# Patient Record
Sex: MALE | Race: WHITE | NOT HISPANIC OR LATINO | URBAN - METROPOLITAN AREA
[De-identification: names, ages, dates, MRNs, and addresses within clinical notes are randomized per-mention and may not be internally consistent; named-entity substitution may affect disease eponyms.]

---

## 2020-08-05 ENCOUNTER — APPOINTMENT (OUTPATIENT)
Dept: URBAN - METROPOLITAN AREA CLINIC 237 | Age: 60
Setting detail: DERMATOLOGY
End: 2020-08-05

## 2020-08-05 VITALS — TEMPERATURE: 98.2 F

## 2020-08-05 DIAGNOSIS — D22 MELANOCYTIC NEVI: ICD-10-CM

## 2020-08-05 DIAGNOSIS — L57.0 ACTINIC KERATOSIS: ICD-10-CM

## 2020-08-05 DIAGNOSIS — L82.1 OTHER SEBORRHEIC KERATOSIS: ICD-10-CM

## 2020-08-05 PROBLEM — D22.5 MELANOCYTIC NEVI OF TRUNK: Status: ACTIVE | Noted: 2020-08-05

## 2020-08-05 PROCEDURE — 17000 DESTRUCT PREMALG LESION: CPT

## 2020-08-05 PROCEDURE — OTHER REASSURANCE: OTHER

## 2020-08-05 PROCEDURE — OTHER LIQUID NITROGEN: OTHER

## 2020-08-05 PROCEDURE — OTHER MIPS QUALITY: OTHER

## 2020-08-05 PROCEDURE — OTHER OBSERVATION AND MEASURE: OTHER

## 2020-08-05 PROCEDURE — OTHER ADDITIONAL NOTES: OTHER

## 2020-08-05 PROCEDURE — OTHER COUNSELING: OTHER

## 2020-08-05 PROCEDURE — OTHER OBSERVATION: OTHER

## 2020-08-05 PROCEDURE — 99203 OFFICE O/P NEW LOW 30 MIN: CPT | Mod: 25

## 2020-08-05 ASSESSMENT — LOCATION SIMPLE DESCRIPTION DERM
LOCATION SIMPLE: ABDOMEN
LOCATION SIMPLE: RIGHT FOREHEAD
LOCATION SIMPLE: RIGHT LOWER BACK

## 2020-08-05 ASSESSMENT — LOCATION ZONE DERM
LOCATION ZONE: FACE
LOCATION ZONE: TRUNK

## 2020-08-05 ASSESSMENT — LOCATION DETAILED DESCRIPTION DERM
LOCATION DETAILED: RIGHT SUPERIOR MEDIAL MIDBACK
LOCATION DETAILED: RIGHT INFERIOR MEDIAL MIDBACK
LOCATION DETAILED: RIGHT MEDIAL FOREHEAD
LOCATION DETAILED: RIGHT LATERAL ABDOMEN

## 2020-08-05 NOTE — PROCEDURE: MIPS QUALITY
Quality 110: Preventive Care And Screening: Influenza Immunization: Influenza Immunization Administered during Influenza season
Quality 400a: One-Time Screening For Hepatitis C Virus (Hcv) For All Patients: Patient received one-time screening for HCV infection
Detail Level: Detailed

## 2020-08-14 ENCOUNTER — APPOINTMENT (OUTPATIENT)
Dept: URBAN - METROPOLITAN AREA CLINIC 237 | Age: 60
Setting detail: DERMATOLOGY
End: 2020-08-14

## 2020-08-14 DIAGNOSIS — L82.0 INFLAMED SEBORRHEIC KERATOSIS: ICD-10-CM

## 2020-08-14 PROCEDURE — 17110 DESTRUCT B9 LESION 1-14: CPT | Mod: 79

## 2020-08-14 PROCEDURE — OTHER LIQUID NITROGEN: OTHER

## 2020-08-14 ASSESSMENT — LOCATION ZONE DERM: LOCATION ZONE: TRUNK

## 2020-08-14 ASSESSMENT — LOCATION SIMPLE DESCRIPTION DERM: LOCATION SIMPLE: RIGHT UPPER BACK

## 2020-08-14 ASSESSMENT — LOCATION DETAILED DESCRIPTION DERM: LOCATION DETAILED: RIGHT INFERIOR MEDIAL UPPER BACK

## 2020-08-14 NOTE — PROCEDURE: LIQUID NITROGEN
Detail Level: Simple
Post-Care Instructions: I reviewed with the patient in detail post-care instructions. Patient is to leave areas alone, and resume any home care if nothing happens, or when the scab falls off.
Medical Necessity Clause: This procedure was medically necessary because the lesions that were treated were:
Render Note In Bullet Format When Appropriate: No
Consent: The patient/parent's consent was obtained including but not limited to risks of crusting, scabbing, blistering, scarring, darker or lighter pigmentary change, recurrence, incomplete removal.
Include Z78.9 (Other Specified Conditions Influencing Health Status) As An Associated Diagnosis?: Yes
Aperture Size (Optional): D
Medical Necessity Information: It is in your best interest to select a reason for this procedure from the list below. All of these items fulfill various CMS LCD requirements except the new and changing color options.
Number Of Freeze-Thaw Cycles: 3 freeze-thaw cycles

## 2020-12-28 ENCOUNTER — APPOINTMENT (OUTPATIENT)
Dept: URBAN - METROPOLITAN AREA CLINIC 237 | Age: 60
Setting detail: DERMATOLOGY
End: 2020-12-28

## 2020-12-28 VITALS — TEMPERATURE: 97.3 F

## 2020-12-28 DIAGNOSIS — L90.5 SCAR CONDITIONS AND FIBROSIS OF SKIN: ICD-10-CM

## 2020-12-28 DIAGNOSIS — Z80.8 FAMILY HISTORY OF MALIGNANT NEOPLASM OF OTHER ORGANS OR SYSTEMS: ICD-10-CM

## 2020-12-28 DIAGNOSIS — D18.0 HEMANGIOMA: ICD-10-CM

## 2020-12-28 DIAGNOSIS — L81.8 OTHER SPECIFIED DISORDERS OF PIGMENTATION: ICD-10-CM

## 2020-12-28 DIAGNOSIS — D22 MELANOCYTIC NEVI: ICD-10-CM

## 2020-12-28 DIAGNOSIS — I78.8 OTHER DISEASES OF CAPILLARIES: ICD-10-CM

## 2020-12-28 DIAGNOSIS — L82.1 OTHER SEBORRHEIC KERATOSIS: ICD-10-CM

## 2020-12-28 DIAGNOSIS — L57.8 OTHER SKIN CHANGES DUE TO CHRONIC EXPOSURE TO NONIONIZING RADIATION: ICD-10-CM

## 2020-12-28 DIAGNOSIS — I83.9 ASYMPTOMATIC VARICOSE VEINS OF LOWER EXTREMITIES: ICD-10-CM

## 2020-12-28 PROBLEM — D23.72 OTHER BENIGN NEOPLASM OF SKIN OF LEFT LOWER LIMB, INCLUDING HIP: Status: ACTIVE | Noted: 2020-12-28

## 2020-12-28 PROBLEM — D18.01 HEMANGIOMA OF SKIN AND SUBCUTANEOUS TISSUE: Status: ACTIVE | Noted: 2020-12-28

## 2020-12-28 PROBLEM — D22.5 MELANOCYTIC NEVI OF TRUNK: Status: ACTIVE | Noted: 2020-12-28

## 2020-12-28 PROBLEM — I83.93 ASYMPTOMATIC VARICOSE VEINS OF BILATERAL LOWER EXTREMITIES: Status: ACTIVE | Noted: 2020-12-28

## 2020-12-28 PROCEDURE — OTHER NOTED ON EXAM BUT NOT TREATED: OTHER

## 2020-12-28 PROCEDURE — OTHER OBSERVATION: OTHER

## 2020-12-28 PROCEDURE — OTHER DIAGNOSIS COMMENT: OTHER

## 2020-12-28 PROCEDURE — OTHER MIPS QUALITY: OTHER

## 2020-12-28 PROCEDURE — OTHER COUNSELING: OTHER

## 2020-12-28 PROCEDURE — OTHER OBSERVATION AND MEASURE: OTHER

## 2020-12-28 PROCEDURE — 99213 OFFICE O/P EST LOW 20 MIN: CPT

## 2020-12-28 PROCEDURE — OTHER PHOTO-DOCUMENTATION: OTHER

## 2020-12-28 ASSESSMENT — LOCATION DETAILED DESCRIPTION DERM
LOCATION DETAILED: RIGHT ACHILLES SKIN
LOCATION DETAILED: EPIGASTRIC SKIN
LOCATION DETAILED: INFERIOR THORACIC SPINE
LOCATION DETAILED: RIGHT CLAVICULAR SKIN
LOCATION DETAILED: LEFT ANTERIOR PROXIMAL THIGH
LOCATION DETAILED: RIGHT DISTAL CALF
LOCATION DETAILED: LEFT DISTAL CALF
LOCATION DETAILED: LEFT POSTERIOR MEDIAL MALLEOLUS
LOCATION DETAILED: RIGHT ANTERIOR DISTAL UPPER ARM
LOCATION DETAILED: NASAL DORSUM
LOCATION DETAILED: LEFT ANTERIOR LATERAL DISTAL UPPER ARM
LOCATION DETAILED: LEFT LATERAL ABDOMEN
LOCATION DETAILED: RIGHT LATERAL ABDOMEN
LOCATION DETAILED: SUPERIOR THORACIC SPINE
LOCATION DETAILED: RIGHT KNEE
LOCATION DETAILED: STERNUM
LOCATION DETAILED: RIGHT INFERIOR MEDIAL MIDBACK

## 2020-12-28 ASSESSMENT — LOCATION SIMPLE DESCRIPTION DERM
LOCATION SIMPLE: RIGHT CALF
LOCATION SIMPLE: RIGHT KNEE
LOCATION SIMPLE: RIGHT ACHILLES SKIN
LOCATION SIMPLE: LEFT UPPER ARM
LOCATION SIMPLE: NOSE
LOCATION SIMPLE: LEFT CALF
LOCATION SIMPLE: LEFT THIGH
LOCATION SIMPLE: RIGHT UPPER ARM
LOCATION SIMPLE: LEFT ANKLE
LOCATION SIMPLE: ABDOMEN
LOCATION SIMPLE: CHEST
LOCATION SIMPLE: RIGHT CLAVICULAR SKIN
LOCATION SIMPLE: UPPER BACK
LOCATION SIMPLE: RIGHT LOWER BACK

## 2020-12-28 ASSESSMENT — LOCATION ZONE DERM
LOCATION ZONE: ARM
LOCATION ZONE: NOSE
LOCATION ZONE: LEG
LOCATION ZONE: TRUNK

## 2020-12-28 NOTE — PROCEDURE: OBSERVATION
Detail Level: Detailed
Size Of Lesion: 5x1.5mm mbm
Size Of Lesion: 7 x 6mm light brown patch
Size Of Lesion: 3 x 2mm med br

## 2020-12-28 NOTE — PROCEDURE: DIAGNOSIS COMMENT
Detail Level: Simple
Comment: 2/2 surgery
Comment: 2/2 kidney retrieval surgery (patients was donor)
Comment: Father

## 2021-07-14 ENCOUNTER — APPOINTMENT (OUTPATIENT)
Dept: URBAN - METROPOLITAN AREA CLINIC 237 | Age: 61
Setting detail: DERMATOLOGY
End: 2021-07-14

## 2021-07-14 DIAGNOSIS — L259 CONTACT DERMATITIS AND OTHER ECZEMA, UNSPECIFIED CAUSE: ICD-10-CM

## 2021-07-14 PROBLEM — L30.8 OTHER SPECIFIED DERMATITIS: Status: ACTIVE | Noted: 2021-07-14

## 2021-07-14 PROCEDURE — OTHER TREATMENT REGIMEN: OTHER

## 2021-07-14 PROCEDURE — OTHER PRESCRIPTION: OTHER

## 2021-07-14 PROCEDURE — OTHER COUNSELING: OTHER

## 2021-07-14 PROCEDURE — OTHER COUNSELING: TOPICAL STEROIDS: OTHER

## 2021-07-14 PROCEDURE — 99213 OFFICE O/P EST LOW 20 MIN: CPT

## 2021-07-14 RX ORDER — DESONIDE 0.5 MG/G
OINTMENT TOPICAL
Qty: 1 | Refills: 0 | Status: ERX | COMMUNITY
Start: 2021-07-14

## 2021-07-14 ASSESSMENT — LOCATION ZONE DERM
LOCATION ZONE: PERINEUM
LOCATION ZONE: SCROTUM

## 2021-07-14 ASSESSMENT — LOCATION DETAILED DESCRIPTION DERM
LOCATION DETAILED: PERINEUM
LOCATION DETAILED: SUPERIOR MIDLINE SCROTUM

## 2021-07-14 ASSESSMENT — LOCATION SIMPLE DESCRIPTION DERM
LOCATION SIMPLE: PERINEUM
LOCATION SIMPLE: SUPERIOR SCROTUM

## 2021-08-18 ENCOUNTER — RX ONLY (RX ONLY)
Age: 61
End: 2021-08-18

## 2021-08-18 ENCOUNTER — APPOINTMENT (OUTPATIENT)
Dept: URBAN - METROPOLITAN AREA CLINIC 237 | Age: 61
Setting detail: DERMATOLOGY
End: 2021-08-18

## 2021-08-18 DIAGNOSIS — L259 CONTACT DERMATITIS AND OTHER ECZEMA, UNSPECIFIED CAUSE: ICD-10-CM

## 2021-08-18 PROBLEM — L30.8 OTHER SPECIFIED DERMATITIS: Status: ACTIVE | Noted: 2021-08-18

## 2021-08-18 PROCEDURE — OTHER TREATMENT REGIMEN: OTHER

## 2021-08-18 PROCEDURE — OTHER COUNSELING: TOPICAL STEROIDS: OTHER

## 2021-08-18 PROCEDURE — 99213 OFFICE O/P EST LOW 20 MIN: CPT

## 2021-08-18 PROCEDURE — OTHER COUNSELING: OTHER

## 2021-08-18 RX ORDER — TACROLIMUS 1 MG/G
OINTMENT TOPICAL
Qty: 1 | Refills: 0 | Status: ERX | COMMUNITY
Start: 2021-08-18

## 2021-08-18 RX ORDER — DESONIDE 0.5 MG/G
OINTMENT TOPICAL
Qty: 1 | Refills: 0 | Status: ERX

## 2021-08-18 ASSESSMENT — LOCATION ZONE DERM
LOCATION ZONE: SCROTUM
LOCATION ZONE: PERINEUM

## 2021-08-18 ASSESSMENT — LOCATION DETAILED DESCRIPTION DERM
LOCATION DETAILED: SUPERIOR MIDLINE SCROTUM
LOCATION DETAILED: PERINEUM

## 2021-08-18 ASSESSMENT — LOCATION SIMPLE DESCRIPTION DERM
LOCATION SIMPLE: SUPERIOR SCROTUM
LOCATION SIMPLE: PERINEUM

## 2021-08-18 NOTE — PROCEDURE: TREATMENT REGIMEN
Detail Level: Simple
Initiate Treatment: (OTC) gold bond or Zeasorb powder\\nProtopic 0.1% se BID PRN when not using desonide
Continue Regimen: Desonide 0.05% se BID PRN for no longer than 2 weeks at a time

## 2021-12-15 ENCOUNTER — APPOINTMENT (OUTPATIENT)
Dept: URBAN - METROPOLITAN AREA CLINIC 237 | Age: 61
Setting detail: DERMATOLOGY
End: 2021-12-15

## 2021-12-15 DIAGNOSIS — Z80.8 FAMILY HISTORY OF MALIGNANT NEOPLASM OF OTHER ORGANS OR SYSTEMS: ICD-10-CM

## 2021-12-15 DIAGNOSIS — L81.8 OTHER SPECIFIED DISORDERS OF PIGMENTATION: ICD-10-CM

## 2021-12-15 DIAGNOSIS — D22 MELANOCYTIC NEVI: ICD-10-CM

## 2021-12-15 DIAGNOSIS — I83.9 ASYMPTOMATIC VARICOSE VEINS OF LOWER EXTREMITIES: ICD-10-CM

## 2021-12-15 DIAGNOSIS — L90.5 SCAR CONDITIONS AND FIBROSIS OF SKIN: ICD-10-CM

## 2021-12-15 DIAGNOSIS — L57.0 ACTINIC KERATOSIS: ICD-10-CM

## 2021-12-15 DIAGNOSIS — D18.0 HEMANGIOMA: ICD-10-CM

## 2021-12-15 DIAGNOSIS — L259 CONTACT DERMATITIS AND OTHER ECZEMA, UNSPECIFIED CAUSE: ICD-10-CM

## 2021-12-15 DIAGNOSIS — L82.1 OTHER SEBORRHEIC KERATOSIS: ICD-10-CM

## 2021-12-15 DIAGNOSIS — L57.8 OTHER SKIN CHANGES DUE TO CHRONIC EXPOSURE TO NONIONIZING RADIATION: ICD-10-CM

## 2021-12-15 DIAGNOSIS — L71.8 OTHER ROSACEA: ICD-10-CM

## 2021-12-15 PROBLEM — I83.93 ASYMPTOMATIC VARICOSE VEINS OF BILATERAL LOWER EXTREMITIES: Status: ACTIVE | Noted: 2021-12-15

## 2021-12-15 PROBLEM — D23.72 OTHER BENIGN NEOPLASM OF SKIN OF LEFT LOWER LIMB, INCLUDING HIP: Status: ACTIVE | Noted: 2021-12-15

## 2021-12-15 PROBLEM — D22.5 MELANOCYTIC NEVI OF TRUNK: Status: ACTIVE | Noted: 2021-12-15

## 2021-12-15 PROBLEM — D18.01 HEMANGIOMA OF SKIN AND SUBCUTANEOUS TISSUE: Status: ACTIVE | Noted: 2021-12-15

## 2021-12-15 PROBLEM — L30.8 OTHER SPECIFIED DERMATITIS: Status: ACTIVE | Noted: 2021-12-15

## 2021-12-15 PROCEDURE — OTHER LIQUID NITROGEN: OTHER

## 2021-12-15 PROCEDURE — OTHER OBSERVATION: OTHER

## 2021-12-15 PROCEDURE — OTHER PRESCRIPTION: OTHER

## 2021-12-15 PROCEDURE — OTHER MIPS QUALITY: OTHER

## 2021-12-15 PROCEDURE — OTHER BIOPSY BY SHAVE METHOD: OTHER

## 2021-12-15 PROCEDURE — OTHER OBSERVATION AND MEASURE: OTHER

## 2021-12-15 PROCEDURE — OTHER DIAGNOSIS COMMENT: OTHER

## 2021-12-15 PROCEDURE — OTHER TREATMENT REGIMEN: OTHER

## 2021-12-15 PROCEDURE — 11102 TANGNTL BX SKIN SINGLE LES: CPT

## 2021-12-15 PROCEDURE — OTHER MEDICAL PHOTOGRAPHY REVIEW: OTHER

## 2021-12-15 PROCEDURE — OTHER COUNSELING: TOPICAL STEROIDS: OTHER

## 2021-12-15 PROCEDURE — 17000 DESTRUCT PREMALG LESION: CPT | Mod: 59

## 2021-12-15 PROCEDURE — OTHER COUNSELING: OTHER

## 2021-12-15 PROCEDURE — 99213 OFFICE O/P EST LOW 20 MIN: CPT | Mod: 25

## 2021-12-15 PROCEDURE — OTHER PHOTO-DOCUMENTATION: OTHER

## 2021-12-15 PROCEDURE — OTHER NOTED ON EXAM BUT NOT TREATED: OTHER

## 2021-12-15 RX ORDER — METRONIDAZOLE 7.5 MG/G
CREAM TOPICAL BID
Qty: 45 | Refills: 1 | Status: ERX | COMMUNITY
Start: 2021-12-15

## 2021-12-15 RX ORDER — DESONIDE 0.5 MG/G
OINTMENT TOPICAL
Qty: 60 | Refills: 0 | Status: ERX | COMMUNITY
Start: 2021-12-15

## 2021-12-15 ASSESSMENT — LOCATION DETAILED DESCRIPTION DERM
LOCATION DETAILED: RIGHT CLAVICULAR SKIN
LOCATION DETAILED: LEFT ANTERIOR PROXIMAL THIGH
LOCATION DETAILED: SUPERIOR MIDLINE SCROTUM
LOCATION DETAILED: LEFT CAVUM CONCHA
LOCATION DETAILED: RIGHT INFERIOR MEDIAL UPPER BACK
LOCATION DETAILED: INFERIOR THORACIC SPINE
LOCATION DETAILED: RIGHT LATERAL ABDOMEN
LOCATION DETAILED: PERINEUM
LOCATION DETAILED: NASAL DORSUM
LOCATION DETAILED: LEFT POSTERIOR MEDIAL MALLEOLUS
LOCATION DETAILED: SUPERIOR THORACIC SPINE
LOCATION DETAILED: LEFT ANTERIOR LATERAL DISTAL UPPER ARM
LOCATION DETAILED: LEFT LATERAL ABDOMEN
LOCATION DETAILED: RIGHT DISTAL CALF
LOCATION DETAILED: PERIUMBILICAL SKIN
LOCATION DETAILED: EPIGASTRIC SKIN
LOCATION DETAILED: LEFT LATERAL UPPER BACK
LOCATION DETAILED: RIGHT ACHILLES SKIN
LOCATION DETAILED: STERNUM
LOCATION DETAILED: LEFT CENTRAL MALAR CHEEK
LOCATION DETAILED: RIGHT KNEE
LOCATION DETAILED: RIGHT ANTERIOR DISTAL UPPER ARM
LOCATION DETAILED: LEFT DISTAL CALF

## 2021-12-15 ASSESSMENT — LOCATION SIMPLE DESCRIPTION DERM
LOCATION SIMPLE: LEFT ANKLE
LOCATION SIMPLE: RIGHT ACHILLES SKIN
LOCATION SIMPLE: RIGHT UPPER ARM
LOCATION SIMPLE: LEFT THIGH
LOCATION SIMPLE: LEFT CALF
LOCATION SIMPLE: RIGHT KNEE
LOCATION SIMPLE: ABDOMEN
LOCATION SIMPLE: LEFT UPPER BACK
LOCATION SIMPLE: RIGHT CALF
LOCATION SIMPLE: PERINEUM
LOCATION SIMPLE: CHEST
LOCATION SIMPLE: LEFT CHEEK
LOCATION SIMPLE: LEFT UPPER ARM
LOCATION SIMPLE: RIGHT CLAVICULAR SKIN
LOCATION SIMPLE: LEFT EAR
LOCATION SIMPLE: NOSE
LOCATION SIMPLE: RIGHT UPPER BACK
LOCATION SIMPLE: UPPER BACK
LOCATION SIMPLE: SUPERIOR SCROTUM

## 2021-12-15 ASSESSMENT — LOCATION ZONE DERM
LOCATION ZONE: TRUNK
LOCATION ZONE: LEG
LOCATION ZONE: FACE
LOCATION ZONE: PERINEUM
LOCATION ZONE: NOSE
LOCATION ZONE: SCROTUM
LOCATION ZONE: EAR
LOCATION ZONE: ARM

## 2021-12-15 NOTE — PROCEDURE: OBSERVATION
Size Of Lesion: 5x1.5mm mbm
Size Of Lesion: 7x6mm light brown patch
Detail Level: Detailed
Size Of Lesion: 4x3 mm mb thin papule

## 2021-12-15 NOTE — PROCEDURE: TREATMENT REGIMEN
Initiate Treatment: MC 0.75% cr bid
Continue Regimen: Desonide 0.05% LIANA BID PRN x2 wks max\\nGB or Zeasorb powder PRN
Detail Level: Simple

## 2021-12-15 NOTE — PROCEDURE: BIOPSY BY SHAVE METHOD
Hide Anesthesia Volume?: No
Depth Of Biopsy: dermis
Consent: Written consent was obtained and risks were reviewed including but not limited to scarring, infection, bleeding, scabbing, incomplete removal, nerve damage and allergy to anesthesia.
Detail Level: Detailed
Size Of Lesion In Cm: 0
Cryotherapy Text: The wound bed was treated with cryotherapy after the biopsy was performed.
Electrodesiccation And Curettage Text: The wound bed was treated with electrodesiccation and curettage after the biopsy was performed.
Wound Care: Aquaphor
Billing Type: Third-Party Bill
Dressing: Band-Aid
Electrodesiccation Text: The wound bed was treated with electrodesiccation after the biopsy was performed.
Post-Care Instructions: I reviewed with the patient in detail post-care instructions. Patient is to keep the biopsy site dry overnight, and then apply bacitracin twice daily until healed. Patient may apply hydrogen peroxide soaks to remove any crusting.
Validate Note Data (See Information Below): Yes
Silver Nitrate Text: The wound bed was treated with silver nitrate after the biopsy was performed.
Anesthesia Volume In Cc (Will Not Render If 0): 0.1
Curettage Text: The wound bed was treated with curettage after the biopsy was performed.
Hemostasis: Drysol
Biopsy Method: Dermablade
Type Of Destruction Used: Curettage
Notification Instructions: Patient will be notified of biopsy results. However, patient instructed to call the office if not contacted within 2 weeks.
Anesthesia Type: 1% lidocaine with epinephrine
Biopsy Type: H and E
Information: Selecting Yes will display possible errors in your note based on the variables you have selected. This validation is only offered as a suggestion for you. PLEASE NOTE THAT THE VALIDATION TEXT WILL BE REMOVED WHEN YOU FINALIZE YOUR NOTE. IF YOU WANT TO FAX A PRELIMINARY NOTE YOU WILL NEED TO TOGGLE THIS TO 'NO' IF YOU DO NOT WANT IT IN YOUR FAXED NOTE.

## 2021-12-15 NOTE — PROCEDURE: LIQUID NITROGEN
Show Applicator Variable?: Yes
Detail Level: Simple
Post-Care Instructions: I reviewed with the patient in detail post-care instructions. Patient is to wear sunprotection, and avoid picking at any of the treated lesions. Pt may apply Vaseline to crusted or scabbing areas.
Render Note In Bullet Format When Appropriate: No
Number Of Freeze-Thaw Cycles: 2 freeze-thaw cycles
Duration Of Freeze Thaw-Cycle (Seconds): 5
Consent: The patient's consent was obtained including but not limited to risks of crusting, scabbing, blistering, scarring, darker or lighter pigmentary change, recurrence, incomplete removal and infection.

## 2022-09-13 ENCOUNTER — RX ONLY (RX ONLY)
Age: 62
End: 2022-09-13

## 2022-09-13 RX ORDER — DESONIDE 0.5 MG/G
OINTMENT TOPICAL
Qty: 60 | Refills: 0 | Status: ERX

## 2022-09-26 ENCOUNTER — RX ONLY (RX ONLY)
Age: 62
End: 2022-09-26

## 2022-09-26 RX ORDER — DESONIDE 0.5 MG/G
OINTMENT TOPICAL
Qty: 60 | Refills: 0 | Status: ERX

## 2023-02-14 ENCOUNTER — NON-APPOINTMENT (OUTPATIENT)
Age: 63
End: 2023-02-14

## 2023-02-15 ENCOUNTER — APPOINTMENT (OUTPATIENT)
Dept: OTOLARYNGOLOGY | Facility: CLINIC | Age: 63
End: 2023-02-15
Payer: COMMERCIAL

## 2023-02-15 VITALS — TEMPERATURE: 97 F | BODY MASS INDEX: 26.18 KG/M2 | WEIGHT: 215 LBS | HEIGHT: 76 IN

## 2023-02-15 DIAGNOSIS — H69.81 OTHER SPECIFIED DISORDERS OF EUSTACHIAN TUBE, RIGHT EAR: ICD-10-CM

## 2023-02-15 DIAGNOSIS — H61.813 EXOSTOSIS OF EXTERNAL CANAL, BILATERAL: ICD-10-CM

## 2023-02-15 DIAGNOSIS — H66.91 OTITIS MEDIA, UNSPECIFIED, RIGHT EAR: ICD-10-CM

## 2023-02-15 DIAGNOSIS — H90.3 SENSORINEURAL HEARING LOSS, BILATERAL: ICD-10-CM

## 2023-02-15 PROCEDURE — 99204 OFFICE O/P NEW MOD 45 MIN: CPT

## 2023-02-15 PROCEDURE — 92557 COMPREHENSIVE HEARING TEST: CPT

## 2023-02-15 PROCEDURE — 92567 TYMPANOMETRY: CPT

## 2023-02-15 NOTE — REASON FOR VISIT
[Initial Consultation] : an initial consultation for [Other: _____] : [unfilled] [FreeTextEntry2] : right ear pain

## 2023-02-15 NOTE — REVIEW OF SYSTEMS
[Ear Noises] : ear noises [Negative] : Heme/Lymph [de-identified] : ruptured ear drum on gloria eve

## 2023-02-15 NOTE — HISTORY OF PRESENT ILLNESS
[de-identified] : c/o pain in right ear.  Has been improving.  did have ear infection about 6 weeks ago - states had perf.  Was treated wth augmentin.  Occ feels plugged left ear.  Treated at Urgent Care.   Here for followup - no  prior ear problems except hx of tinnitus.  Does wear hearing aids.

## 2023-02-15 NOTE — DATA REVIEWED
[de-identified] : Left ear-Type A; normal to severe SNHL\par Right ear-Type A; mild to profound SNHL\par Asymmtry noted

## 2023-02-15 NOTE — ASSESSMENT
[FreeTextEntry1] : Patient here for follow up for ear infection about 6 weeks ago.  Occ clogging in ears and sl discomfort.  No infection now.  Audio shows some snhl with sl asymetry.  A tymps.  Feel ET issue -  recommended floanse.   Would repeat audio in several mos - follow up in 2 mos and as necessary.  Also has bilat small exostosis of eac - no treatment indicated for this at this time.

## 2023-02-15 NOTE — PHYSICAL EXAM
[Midline] : trachea located in midline position [Normal] : no rashes [de-identified] : exostosis both eac medial and anterior

## 2023-03-13 ENCOUNTER — APPOINTMENT (OUTPATIENT)
Dept: INTERNAL MEDICINE | Facility: CLINIC | Age: 63
End: 2023-03-13
Payer: COMMERCIAL

## 2023-03-13 ENCOUNTER — NON-APPOINTMENT (OUTPATIENT)
Age: 63
End: 2023-03-13

## 2023-03-13 VITALS
TEMPERATURE: 98 F | SYSTOLIC BLOOD PRESSURE: 122 MMHG | BODY MASS INDEX: 26.18 KG/M2 | DIASTOLIC BLOOD PRESSURE: 84 MMHG | HEART RATE: 69 BPM | WEIGHT: 215 LBS | OXYGEN SATURATION: 98 % | HEIGHT: 76 IN

## 2023-03-13 DIAGNOSIS — Z00.00 ENCOUNTER FOR GENERAL ADULT MEDICAL EXAMINATION W/OUT ABNORMAL FINDINGS: ICD-10-CM

## 2023-03-13 PROCEDURE — 93000 ELECTROCARDIOGRAM COMPLETE: CPT | Mod: 59

## 2023-03-13 PROCEDURE — 99386 PREV VISIT NEW AGE 40-64: CPT | Mod: 25

## 2023-03-13 NOTE — PLAN
[FreeTextEntry1] : Mr. Levin presents for initial annual physical examination.\par He will continue on fluticasone nasal spray for symptoms of rhinitis.\par Prescription for CBC, CMP, hemoglobin A1c, iron level, lipid profile, PSA level, TSH level and urinalysis.\par He will need a repeat colonoscopy in 2 years.\par Patient will continue to exercise on a regular basis.\par Follow-up in 1 year.

## 2023-03-13 NOTE — HISTORY OF PRESENT ILLNESS
[FreeTextEntry1] : New patient is here for CPE [de-identified] : Mr. Levin presents for initial annual physical examination.\par He recently moved to the [facility] from [place].\par He is without complaint.\par Mr. Levin is extremely active.  He is run 17 marathons and has participated in 5 Ironman competitions.\par Patient a colonoscopy at age 49 and age 54.  He was told not to repeat a colonoscopy until age 64.

## 2023-03-13 NOTE — HEALTH RISK ASSESSMENT
[2 - 4 times a month (2 pts)] : 2-4 times a month (2 points) [1 or 2 (0 pts)] : 1 or 2 (0 points) [Never (0 pts)] : Never (0 points) [No] : In the past 12 months have you used drugs other than those required for medical reasons? No [0] : 2) Feeling down, depressed, or hopeless: Not at all (0) [PHQ-2 Negative - No further assessment needed] : PHQ-2 Negative - No further assessment needed [Patient reported colonoscopy was normal] : Patient reported colonoscopy was normal [Excellent] : ~his/her~  mood as  excellent [Never] : Never [Audit-CScore] : 2 [[id number]] : 0 [ColonoscopyDate] : 03/14

## 2023-03-14 LAB
ALBUMIN SERPL ELPH-MCNC: 4.3 G/DL
ALP BLD-CCNC: 64 U/L
ALT SERPL-CCNC: 18 U/L
ANION GAP SERPL CALC-SCNC: 11 MMOL/L
APPEARANCE: CLEAR
AST SERPL-CCNC: 19 U/L
BACTERIA: NEGATIVE
BASOPHILS # BLD AUTO: 0.05 K/UL
BASOPHILS NFR BLD AUTO: 1.2 %
BILIRUB SERPL-MCNC: 0.6 MG/DL
BILIRUBIN URINE: NEGATIVE
BLOOD URINE: NEGATIVE
BUN SERPL-MCNC: 17 MG/DL
CALCIUM SERPL-MCNC: 9.4 MG/DL
CHLORIDE SERPL-SCNC: 105 MMOL/L
CHOLEST SERPL-MCNC: 227 MG/DL
CO2 SERPL-SCNC: 24 MMOL/L
COLOR: NORMAL
CREAT SERPL-MCNC: 1.12 MG/DL
EGFR: 74 ML/MIN/1.73M2
EOSINOPHIL # BLD AUTO: 0.28 K/UL
EOSINOPHIL NFR BLD AUTO: 6.9 %
ESTIMATED AVERAGE GLUCOSE: 97 MG/DL
GLUCOSE QUALITATIVE U: NEGATIVE
GLUCOSE SERPL-MCNC: 99 MG/DL
HBA1C MFR BLD HPLC: 5 %
HCT VFR BLD CALC: 42 %
HDLC SERPL-MCNC: 59 MG/DL
HGB BLD-MCNC: 13.9 G/DL
HYALINE CASTS: 0 /LPF
IMM GRANULOCYTES NFR BLD AUTO: 0.2 %
IRON SERPL-MCNC: 147 UG/DL
KETONES URINE: NEGATIVE
LDLC SERPL CALC-MCNC: 150 MG/DL
LEUKOCYTE ESTERASE URINE: NEGATIVE
LYMPHOCYTES # BLD AUTO: 1.19 K/UL
LYMPHOCYTES NFR BLD AUTO: 29.5 %
MAN DIFF?: NORMAL
MCHC RBC-ENTMCNC: 33.1 GM/DL
MCHC RBC-ENTMCNC: 33.3 PG
MCV RBC AUTO: 100.7 FL
MICROSCOPIC-UA: NORMAL
MONOCYTES # BLD AUTO: 0.31 K/UL
MONOCYTES NFR BLD AUTO: 7.7 %
NEUTROPHILS # BLD AUTO: 2.19 K/UL
NEUTROPHILS NFR BLD AUTO: 54.5 %
NITRITE URINE: NEGATIVE
NONHDLC SERPL-MCNC: 168 MG/DL
PH URINE: 6.5
PLATELET # BLD AUTO: 190 K/UL
POTASSIUM SERPL-SCNC: 4.7 MMOL/L
PROT SERPL-MCNC: 6.5 G/DL
PROTEIN URINE: NEGATIVE
PSA SERPL-MCNC: 0.54 NG/ML
RBC # BLD: 4.17 M/UL
RBC # FLD: 12.4 %
RED BLOOD CELLS URINE: 0 /HPF
SODIUM SERPL-SCNC: 140 MMOL/L
SPECIFIC GRAVITY URINE: 1.01
SQUAMOUS EPITHELIAL CELLS: 0 /HPF
TRIGL SERPL-MCNC: 89 MG/DL
TSH SERPL-ACNC: 1.53 UIU/ML
UROBILINOGEN URINE: NORMAL
WBC # FLD AUTO: 4.03 K/UL
WHITE BLOOD CELLS URINE: 0 /HPF

## 2023-03-17 ENCOUNTER — APPOINTMENT (OUTPATIENT)
Dept: OTOLARYNGOLOGY | Facility: CLINIC | Age: 63
End: 2023-03-17
Payer: COMMERCIAL

## 2023-03-17 ENCOUNTER — TRANSCRIPTION ENCOUNTER (OUTPATIENT)
Age: 63
End: 2023-03-17

## 2023-03-17 VITALS — BODY MASS INDEX: 26.18 KG/M2 | HEIGHT: 76 IN | TEMPERATURE: 95.4 F | WEIGHT: 215 LBS

## 2023-03-17 DIAGNOSIS — H93.13 TINNITUS, BILATERAL: ICD-10-CM

## 2023-03-17 DIAGNOSIS — H90.3 SENSORINEURAL HEARING LOSS, BILATERAL: ICD-10-CM

## 2023-03-17 PROCEDURE — 92557 COMPREHENSIVE HEARING TEST: CPT

## 2023-03-17 PROCEDURE — 99213 OFFICE O/P EST LOW 20 MIN: CPT

## 2023-03-17 PROCEDURE — 92567 TYMPANOMETRY: CPT

## 2023-03-17 NOTE — ASSESSMENT
[FreeTextEntry1] : exam unremarkable\par audio SRT AD 20  SRT 15 wnl\par  speech discrim excellent\par fu prn

## 2023-03-17 NOTE — HISTORY OF PRESENT ILLNESS
[de-identified] : fu re ears\par hx hearing loss\par needs clearance of hear aid function with regard to current hearing levels\par tinnitus\par noise exposure in

## 2023-03-27 ENCOUNTER — OUTPATIENT (OUTPATIENT)
Dept: OUTPATIENT SERVICES | Facility: HOSPITAL | Age: 63
LOS: 1 days | End: 2023-03-27
Payer: COMMERCIAL

## 2023-03-27 ENCOUNTER — APPOINTMENT (OUTPATIENT)
Dept: ULTRASOUND IMAGING | Facility: CLINIC | Age: 63
End: 2023-03-27
Payer: COMMERCIAL

## 2023-03-27 DIAGNOSIS — E78.5 HYPERLIPIDEMIA, UNSPECIFIED: ICD-10-CM

## 2023-03-27 PROCEDURE — 93880 EXTRACRANIAL BILAT STUDY: CPT | Mod: 26

## 2023-03-27 PROCEDURE — 93880 EXTRACRANIAL BILAT STUDY: CPT

## 2023-04-17 ENCOUNTER — NON-APPOINTMENT (OUTPATIENT)
Age: 63
End: 2023-04-17

## 2023-04-18 ENCOUNTER — APPOINTMENT (OUTPATIENT)
Dept: OTOLARYNGOLOGY | Facility: CLINIC | Age: 63
End: 2023-04-18

## 2023-04-23 ENCOUNTER — NON-APPOINTMENT (OUTPATIENT)
Age: 63
End: 2023-04-23

## 2023-08-30 LAB
ALBUMIN SERPL ELPH-MCNC: 4.6 G/DL
ALP BLD-CCNC: 70 U/L
ALT SERPL-CCNC: 27 U/L
AST SERPL-CCNC: 27 U/L
BILIRUB DIRECT SERPL-MCNC: 0.2 MG/DL
BILIRUB INDIRECT SERPL-MCNC: 0.5 MG/DL
BILIRUB SERPL-MCNC: 0.7 MG/DL
CHOLEST SERPL-MCNC: 185 MG/DL
HDLC SERPL-MCNC: 67 MG/DL
LDLC SERPL CALC-MCNC: 103 MG/DL
NONHDLC SERPL-MCNC: 118 MG/DL
PROT SERPL-MCNC: 6.6 G/DL
TRIGL SERPL-MCNC: 87 MG/DL

## 2023-09-06 ENCOUNTER — APPOINTMENT (OUTPATIENT)
Dept: INTERNAL MEDICINE | Facility: CLINIC | Age: 63
End: 2023-09-06
Payer: COMMERCIAL

## 2023-09-06 VITALS
WEIGHT: 219 LBS | HEIGHT: 76 IN | RESPIRATION RATE: 16 BRPM | DIASTOLIC BLOOD PRESSURE: 72 MMHG | HEART RATE: 67 BPM | SYSTOLIC BLOOD PRESSURE: 110 MMHG | OXYGEN SATURATION: 98 % | BODY MASS INDEX: 26.67 KG/M2 | TEMPERATURE: 98 F

## 2023-09-06 DIAGNOSIS — Z00.00 ENCOUNTER FOR GENERAL ADULT MEDICAL EXAMINATION W/OUT ABNORMAL FINDINGS: ICD-10-CM

## 2023-09-06 DIAGNOSIS — I65.23 OCCLUSION AND STENOSIS OF BILATERAL CAROTID ARTERIES: ICD-10-CM

## 2023-09-06 DIAGNOSIS — N52.9 MALE ERECTILE DYSFUNCTION, UNSPECIFIED: ICD-10-CM

## 2023-09-06 PROCEDURE — 99214 OFFICE O/P EST MOD 30 MIN: CPT

## 2023-09-06 NOTE — PLAN
[FreeTextEntry1] : Mr. Levin presents for follow-up evaluation.  1.  He will continue on current medication regimen which has been reviewed and revised.  2.  Lipid profile and hepatic profile reviewed with patient.  3.  Patient has a history of erectile dysfunction.  He has been given a prescription for tadalafil 5 mg p.o. daily.  He had previously tried sildenafil and developed flushing and lightheadedness.  4.  Follow-up in 6 months with comprehensive blood profile.

## 2023-09-06 NOTE — HISTORY OF PRESENT ILLNESS
[FreeTextEntry1] : Follow-up evaluation [de-identified] : Mr. Levin presents for follow-up evaluation.  He is feeling well.  He denies any chest pain, shortness of breath or palpitations.  He has no hematuria or dysuria.  He presents for review of lipid profile.

## 2023-11-30 ENCOUNTER — TRANSCRIPTION ENCOUNTER (OUTPATIENT)
Age: 63
End: 2023-11-30

## 2024-02-01 ENCOUNTER — APPOINTMENT (OUTPATIENT)
Dept: DERMATOLOGY | Facility: CLINIC | Age: 64
End: 2024-02-01
Payer: COMMERCIAL

## 2024-02-01 ENCOUNTER — NON-APPOINTMENT (OUTPATIENT)
Age: 64
End: 2024-02-01

## 2024-02-01 DIAGNOSIS — D18.01 HEMANGIOMA OF SKIN AND SUBCUTANEOUS TISSUE: ICD-10-CM

## 2024-02-01 DIAGNOSIS — L85.3 XEROSIS CUTIS: ICD-10-CM

## 2024-02-01 DIAGNOSIS — L57.0 ACTINIC KERATOSIS: ICD-10-CM

## 2024-02-01 DIAGNOSIS — D22.9 MELANOCYTIC NEVI, UNSPECIFIED: ICD-10-CM

## 2024-02-01 DIAGNOSIS — L30.4 ERYTHEMA INTERTRIGO: ICD-10-CM

## 2024-02-01 DIAGNOSIS — L82.1 OTHER SEBORRHEIC KERATOSIS: ICD-10-CM

## 2024-02-01 DIAGNOSIS — L81.4 OTHER MELANIN HYPERPIGMENTATION: ICD-10-CM

## 2024-02-01 PROCEDURE — 17000 DESTRUCT PREMALG LESION: CPT

## 2024-02-01 PROCEDURE — 99204 OFFICE O/P NEW MOD 45 MIN: CPT | Mod: 25

## 2024-02-01 RX ORDER — CICLOPIROX OLAMINE 7.7 MG/G
0.77 CREAM TOPICAL
Qty: 1 | Refills: 2 | Status: ACTIVE | COMMUNITY
Start: 2024-02-01 | End: 1900-01-01

## 2024-02-01 RX ORDER — HYDROCORTISONE 1 %
12 CREAM (GRAM) TOPICAL
Qty: 1 | Refills: 3 | Status: ACTIVE | COMMUNITY
Start: 2024-02-01 | End: 1900-01-01

## 2024-02-01 NOTE — ASSESSMENT
[FreeTextEntry1] : A complete skin examination was performed.  There is no evidence of skin cancer.  We discussed the importance of photoprotection, including the use of hats, protective clothing and sunscreens with an SPF of at least 30.  Sun avoidance was also discussed.  The ABCDE's of melanoma were discussed.  Regular skin exams recommended.   Actinic Keratoses: We have discussed the nature and usual course of these lesions. Cryotherapy administered with 1 cycle to actinic keratoses x 1 The procedure was well tolerated, without complication. We have discussed related skin care   Intertrigo: Ciclopirox cream BID D/c desonide ointment   Xerosis: Ammonium lactate lotion QD-BID

## 2024-02-01 NOTE — PHYSICAL EXAM
[Alert] : alert [Oriented x 3] : ~L oriented x 3 [Well Nourished] : well nourished [Conjunctiva Non-injected] : conjunctiva non-injected [No Visual Lymphadenopathy] : no visual  lymphadenopathy [No Clubbing] : no clubbing [No Edema] : no edema [No Bromhidrosis] : no bromhidrosis [No Chromhidrosis] : no chromhidrosis [FreeTextEntry3] : A full skin exam was performed including the scalp, face (including lips, ears, nose and eyes), neck, chest, abdomen, back, buttocks, upper extremities and lower extremities.  The patient declined examination of the genitalia.    The exam revealed the following benign growths:  [place] pigmented nevi.  Seborrheic keratoses.  Lentigines.  Cherry angioma.  Keratotic macules/papules c/w AK's on the right pre-auricular   Mild pink, scaly patches of inguinal creases

## 2024-02-01 NOTE — HISTORY OF PRESENT ILLNESS
[FreeTextEntry1] : FBSE [de-identified] : Patient presents for skin check; No itching, bleeding, growing, changing lesions noted. No personal hx of skin cancer. Positive family hx in father. Patient also has rash in groin he has been treating with desonide ointment as needed.

## 2024-03-15 ENCOUNTER — RX RENEWAL (OUTPATIENT)
Age: 64
End: 2024-03-15

## 2024-04-08 ENCOUNTER — NON-APPOINTMENT (OUTPATIENT)
Age: 64
End: 2024-04-08

## 2024-04-09 RX ORDER — CEPHALEXIN 500 MG
1 CAPSULE ORAL
Refills: 0 | DISCHARGE
Start: 2024-04-09

## 2024-04-10 ENCOUNTER — INPATIENT (INPATIENT)
Facility: HOSPITAL | Age: 64
LOS: 6 days | Discharge: HOME | DRG: 163 | End: 2024-04-17
Attending: SURGERY | Admitting: SURGERY
Payer: COMMERCIAL

## 2024-04-10 VITALS
DIASTOLIC BLOOD PRESSURE: 64 MMHG | OXYGEN SATURATION: 88 % | SYSTOLIC BLOOD PRESSURE: 103 MMHG | HEART RATE: 70 BPM | HEIGHT: 76 IN | TEMPERATURE: 98 F | WEIGHT: 225.09 LBS | RESPIRATION RATE: 19 BRPM

## 2024-04-10 DIAGNOSIS — S22.49XA MULTIPLE FRACTURES OF RIBS, UNSPECIFIED SIDE, INITIAL ENCOUNTER FOR CLOSED FRACTURE: ICD-10-CM

## 2024-04-10 LAB
ABO RH CONFIRMATION: SIGNIFICANT CHANGE UP
ALBUMIN SERPL ELPH-MCNC: 4.2 G/DL — SIGNIFICANT CHANGE UP (ref 3.3–5)
ALP SERPL-CCNC: 70 U/L — SIGNIFICANT CHANGE UP (ref 40–120)
ALT FLD-CCNC: 54 U/L — SIGNIFICANT CHANGE UP (ref 12–78)
ANION GAP SERPL CALC-SCNC: 4 MMOL/L — LOW (ref 5–17)
APTT BLD: 28.4 SEC — SIGNIFICANT CHANGE UP (ref 24.5–35.6)
AST SERPL-CCNC: 35 U/L — SIGNIFICANT CHANGE UP (ref 15–37)
BASOPHILS # BLD AUTO: 0.05 K/UL — SIGNIFICANT CHANGE UP (ref 0–0.2)
BASOPHILS NFR BLD AUTO: 0.4 % — SIGNIFICANT CHANGE UP (ref 0–2)
BILIRUB SERPL-MCNC: 0.4 MG/DL — SIGNIFICANT CHANGE UP (ref 0.2–1.2)
BLD GP AB SCN SERPL QL: SIGNIFICANT CHANGE UP
BUN SERPL-MCNC: 17 MG/DL — SIGNIFICANT CHANGE UP (ref 7–23)
CALCIUM SERPL-MCNC: 9.6 MG/DL — SIGNIFICANT CHANGE UP (ref 8.5–10.1)
CHLORIDE SERPL-SCNC: 107 MMOL/L — SIGNIFICANT CHANGE UP (ref 96–108)
CO2 SERPL-SCNC: 28 MMOL/L — SIGNIFICANT CHANGE UP (ref 22–31)
CREAT SERPL-MCNC: 1.27 MG/DL — SIGNIFICANT CHANGE UP (ref 0.5–1.3)
EGFR: 63 ML/MIN/1.73M2 — SIGNIFICANT CHANGE UP
EOSINOPHIL # BLD AUTO: 0.1 K/UL — SIGNIFICANT CHANGE UP (ref 0–0.5)
EOSINOPHIL NFR BLD AUTO: 0.9 % — SIGNIFICANT CHANGE UP (ref 0–6)
GLUCOSE SERPL-MCNC: 136 MG/DL — HIGH (ref 70–99)
HCT VFR BLD CALC: 36 % — LOW (ref 39–50)
HCT VFR BLD CALC: 37.7 % — LOW (ref 39–50)
HCT VFR BLD CALC: 41.7 % — SIGNIFICANT CHANGE UP (ref 39–50)
HGB BLD-MCNC: 12.2 G/DL — LOW (ref 13–17)
HGB BLD-MCNC: 12.8 G/DL — LOW (ref 13–17)
HGB BLD-MCNC: 14.4 G/DL — SIGNIFICANT CHANGE UP (ref 13–17)
IMM GRANULOCYTES NFR BLD AUTO: 0.4 % — SIGNIFICANT CHANGE UP (ref 0–0.9)
INR BLD: 0.95 RATIO — SIGNIFICANT CHANGE UP (ref 0.85–1.18)
LYMPHOCYTES # BLD AUTO: 0.61 K/UL — LOW (ref 1–3.3)
LYMPHOCYTES # BLD AUTO: 5.4 % — LOW (ref 13–44)
MCHC RBC-ENTMCNC: 33.7 PG — SIGNIFICANT CHANGE UP (ref 27–34)
MCHC RBC-ENTMCNC: 33.7 PG — SIGNIFICANT CHANGE UP (ref 27–34)
MCHC RBC-ENTMCNC: 33.9 GM/DL — SIGNIFICANT CHANGE UP (ref 32–36)
MCHC RBC-ENTMCNC: 34 GM/DL — SIGNIFICANT CHANGE UP (ref 32–36)
MCHC RBC-ENTMCNC: 34.3 PG — HIGH (ref 27–34)
MCHC RBC-ENTMCNC: 34.5 GM/DL — SIGNIFICANT CHANGE UP (ref 32–36)
MCV RBC AUTO: 99.2 FL — SIGNIFICANT CHANGE UP (ref 80–100)
MCV RBC AUTO: 99.3 FL — SIGNIFICANT CHANGE UP (ref 80–100)
MCV RBC AUTO: 99.4 FL — SIGNIFICANT CHANGE UP (ref 80–100)
MONOCYTES # BLD AUTO: 0.58 K/UL — SIGNIFICANT CHANGE UP (ref 0–0.9)
MONOCYTES NFR BLD AUTO: 5.1 % — SIGNIFICANT CHANGE UP (ref 2–14)
NEUTROPHILS # BLD AUTO: 9.88 K/UL — HIGH (ref 1.8–7.4)
NEUTROPHILS NFR BLD AUTO: 87.8 % — HIGH (ref 43–77)
PLATELET # BLD AUTO: 166 K/UL — SIGNIFICANT CHANGE UP (ref 150–400)
PLATELET # BLD AUTO: 170 K/UL — SIGNIFICANT CHANGE UP (ref 150–400)
PLATELET # BLD AUTO: 206 K/UL — SIGNIFICANT CHANGE UP (ref 150–400)
POTASSIUM SERPL-MCNC: 4.6 MMOL/L — SIGNIFICANT CHANGE UP (ref 3.5–5.3)
POTASSIUM SERPL-SCNC: 4.6 MMOL/L — SIGNIFICANT CHANGE UP (ref 3.5–5.3)
PROT SERPL-MCNC: 7.3 GM/DL — SIGNIFICANT CHANGE UP (ref 6–8.3)
PROTHROM AB SERPL-ACNC: 10.8 SEC — SIGNIFICANT CHANGE UP (ref 9.5–13)
RBC # BLD: 3.62 M/UL — LOW (ref 4.2–5.8)
RBC # BLD: 3.8 M/UL — LOW (ref 4.2–5.8)
RBC # BLD: 4.2 M/UL — SIGNIFICANT CHANGE UP (ref 4.2–5.8)
RBC # FLD: 12.3 % — SIGNIFICANT CHANGE UP (ref 10.3–14.5)
RBC # FLD: 12.3 % — SIGNIFICANT CHANGE UP (ref 10.3–14.5)
RBC # FLD: 12.4 % — SIGNIFICANT CHANGE UP (ref 10.3–14.5)
SODIUM SERPL-SCNC: 139 MMOL/L — SIGNIFICANT CHANGE UP (ref 135–145)
TROPONIN I, HIGH SENSITIVITY RESULT: 7.5 NG/L — SIGNIFICANT CHANGE UP
WBC # BLD: 11.27 K/UL — HIGH (ref 3.8–10.5)
WBC # BLD: 11.68 K/UL — HIGH (ref 3.8–10.5)
WBC # BLD: 8.54 K/UL — SIGNIFICANT CHANGE UP (ref 3.8–10.5)
WBC # FLD AUTO: 11.27 K/UL — HIGH (ref 3.8–10.5)
WBC # FLD AUTO: 11.68 K/UL — HIGH (ref 3.8–10.5)
WBC # FLD AUTO: 8.54 K/UL — SIGNIFICANT CHANGE UP (ref 3.8–10.5)

## 2024-04-10 PROCEDURE — 71045 X-RAY EXAM CHEST 1 VIEW: CPT | Mod: 26,77

## 2024-04-10 PROCEDURE — 85025 COMPLETE CBC W/AUTO DIFF WBC: CPT

## 2024-04-10 PROCEDURE — 74177 CT ABD & PELVIS W/CONTRAST: CPT | Mod: 26,MC

## 2024-04-10 PROCEDURE — 80053 COMPREHEN METABOLIC PANEL: CPT

## 2024-04-10 PROCEDURE — C1889: CPT

## 2024-04-10 PROCEDURE — 71045 X-RAY EXAM CHEST 1 VIEW: CPT

## 2024-04-10 PROCEDURE — 84100 ASSAY OF PHOSPHORUS: CPT

## 2024-04-10 PROCEDURE — 85027 COMPLETE CBC AUTOMATED: CPT

## 2024-04-10 PROCEDURE — 99223 1ST HOSP IP/OBS HIGH 75: CPT

## 2024-04-10 PROCEDURE — C1713: CPT

## 2024-04-10 PROCEDURE — 83735 ASSAY OF MAGNESIUM: CPT

## 2024-04-10 PROCEDURE — 80048 BASIC METABOLIC PNL TOTAL CA: CPT

## 2024-04-10 PROCEDURE — 86803 HEPATITIS C AB TEST: CPT

## 2024-04-10 PROCEDURE — 93005 ELECTROCARDIOGRAM TRACING: CPT

## 2024-04-10 PROCEDURE — 71045 X-RAY EXAM CHEST 1 VIEW: CPT | Mod: 26

## 2024-04-10 PROCEDURE — 88304 TISSUE EXAM BY PATHOLOGIST: CPT

## 2024-04-10 PROCEDURE — 36415 COLL VENOUS BLD VENIPUNCTURE: CPT

## 2024-04-10 PROCEDURE — 93010 ELECTROCARDIOGRAM REPORT: CPT

## 2024-04-10 PROCEDURE — 71260 CT THORAX DX C+: CPT | Mod: 26,MC

## 2024-04-10 PROCEDURE — C9290: CPT

## 2024-04-10 PROCEDURE — 88311 DECALCIFY TISSUE: CPT

## 2024-04-10 PROCEDURE — 99285 EMERGENCY DEPT VISIT HI MDM: CPT

## 2024-04-10 RX ORDER — CEPHALEXIN 500 MG
500 CAPSULE ORAL EVERY 12 HOURS
Refills: 0 | Status: DISCONTINUED | OUTPATIENT
Start: 2024-04-10 | End: 2024-04-12

## 2024-04-10 RX ORDER — SODIUM CHLORIDE 9 MG/ML
1000 INJECTION, SOLUTION INTRAVENOUS
Refills: 0 | Status: DISCONTINUED | OUTPATIENT
Start: 2024-04-10 | End: 2024-04-11

## 2024-04-10 RX ORDER — ONDANSETRON 8 MG/1
4 TABLET, FILM COATED ORAL ONCE
Refills: 0 | Status: COMPLETED | OUTPATIENT
Start: 2024-04-10 | End: 2024-04-10

## 2024-04-10 RX ORDER — HYDROMORPHONE HYDROCHLORIDE 2 MG/ML
1 INJECTION INTRAMUSCULAR; INTRAVENOUS; SUBCUTANEOUS ONCE
Refills: 0 | Status: DISCONTINUED | OUTPATIENT
Start: 2024-04-10 | End: 2024-04-10

## 2024-04-10 RX ORDER — ROSUVASTATIN CALCIUM 5 MG/1
1 TABLET ORAL
Refills: 0 | DISCHARGE

## 2024-04-10 RX ORDER — TADALAFIL 10 MG/1
1 TABLET, FILM COATED ORAL
Refills: 0 | DISCHARGE

## 2024-04-10 RX ORDER — SOD,AMMONIUM,POTASSIUM LACTATE
1 CREAM (GRAM) TOPICAL
Refills: 0 | DISCHARGE

## 2024-04-10 RX ORDER — OXYCODONE HYDROCHLORIDE 5 MG/1
5 TABLET ORAL EVERY 4 HOURS
Refills: 0 | Status: DISCONTINUED | OUTPATIENT
Start: 2024-04-10 | End: 2024-04-11

## 2024-04-10 RX ORDER — LIDOCAINE 4 G/100G
1 CREAM TOPICAL DAILY
Refills: 0 | Status: DISCONTINUED | OUTPATIENT
Start: 2024-04-10 | End: 2024-04-17

## 2024-04-10 RX ORDER — MULTIVIT-MIN/FERROUS GLUCONATE 9 MG/15 ML
1 LIQUID (ML) ORAL
Refills: 0 | DISCHARGE

## 2024-04-10 RX ORDER — CICLOPIROX OLAMINE 7.7 MG/G
1 CREAM TOPICAL
Refills: 0 | DISCHARGE

## 2024-04-10 RX ORDER — OXYCODONE HYDROCHLORIDE 5 MG/1
10 TABLET ORAL EVERY 6 HOURS
Refills: 0 | Status: DISCONTINUED | OUTPATIENT
Start: 2024-04-10 | End: 2024-04-11

## 2024-04-10 RX ORDER — MORPHINE SULFATE 50 MG/1
6 CAPSULE, EXTENDED RELEASE ORAL ONCE
Refills: 0 | Status: DISCONTINUED | OUTPATIENT
Start: 2024-04-10 | End: 2024-04-10

## 2024-04-10 RX ORDER — SODIUM CHLORIDE 9 MG/ML
2000 INJECTION INTRAMUSCULAR; INTRAVENOUS; SUBCUTANEOUS ONCE
Refills: 0 | Status: COMPLETED | OUTPATIENT
Start: 2024-04-10 | End: 2024-04-10

## 2024-04-10 RX ORDER — ACETAMINOPHEN 500 MG
975 TABLET ORAL EVERY 8 HOURS
Refills: 0 | Status: COMPLETED | OUTPATIENT
Start: 2024-04-10 | End: 2024-04-13

## 2024-04-10 RX ORDER — MORPHINE SULFATE 50 MG/1
3 CAPSULE, EXTENDED RELEASE ORAL
Refills: 0 | Status: DISCONTINUED | OUTPATIENT
Start: 2024-04-10 | End: 2024-04-11

## 2024-04-10 RX ADMIN — Medication 975 MILLIGRAM(S): at 21:48

## 2024-04-10 RX ADMIN — ONDANSETRON 4 MILLIGRAM(S): 8 TABLET, FILM COATED ORAL at 17:17

## 2024-04-10 RX ADMIN — Medication 975 MILLIGRAM(S): at 23:55

## 2024-04-10 RX ADMIN — MORPHINE SULFATE 3 MILLIGRAM(S): 50 CAPSULE, EXTENDED RELEASE ORAL at 23:55

## 2024-04-10 RX ADMIN — HYDROMORPHONE HYDROCHLORIDE 1 MILLIGRAM(S): 2 INJECTION INTRAMUSCULAR; INTRAVENOUS; SUBCUTANEOUS at 17:50

## 2024-04-10 RX ADMIN — MORPHINE SULFATE 6 MILLIGRAM(S): 50 CAPSULE, EXTENDED RELEASE ORAL at 17:17

## 2024-04-10 RX ADMIN — MORPHINE SULFATE 3 MILLIGRAM(S): 50 CAPSULE, EXTENDED RELEASE ORAL at 22:54

## 2024-04-10 RX ADMIN — LIDOCAINE 1 PATCH: 4 CREAM TOPICAL at 21:48

## 2024-04-10 RX ADMIN — OXYCODONE HYDROCHLORIDE 10 MILLIGRAM(S): 5 TABLET ORAL at 19:56

## 2024-04-10 RX ADMIN — SODIUM CHLORIDE 2000 MILLILITER(S): 9 INJECTION INTRAMUSCULAR; INTRAVENOUS; SUBCUTANEOUS at 17:17

## 2024-04-10 RX ADMIN — Medication 500 MILLIGRAM(S): at 22:54

## 2024-04-10 NOTE — ED ADULT NURSE NOTE - NS ED NURSE TRANSPORT WITH
julianna  Patient believes she might have missed one of her birth control pills - is looking to discuss with nurse    Chest tube/Cardiac Monitor/Defib/ACLS/Rescue Kit/O2/BVM/oxygen

## 2024-04-10 NOTE — ED ADULT TRIAGE NOTE - CHIEF COMPLAINT QUOTE
Patient presents to the ER with complaints of trip and fall while walking dogs. Patient fell and landed on his back, denies head strike, blood thinner use. Patient received 100 fentanyl intranasally and 100 fentanyl IV, and zofran with EMS. Patient 88-91% on room air.

## 2024-04-10 NOTE — ED ADULT NURSE REASSESSMENT NOTE - NS ED NURSE REASSESS COMMENT FT1
Report taken at the change of shift at bedside. Pt awake alert and oriented x4 resting comfortably in bed with no acute distress noted. R chest tube in place. Vitals stable. Plan of care updated to pt. Dinner provided for comfort. Denies cp,sob,ha,dz,n/v/d/fever/chills or urinary sx. Will cont to monitor for safety and comfort.

## 2024-04-10 NOTE — PATIENT PROFILE ADULT - STATED REASON FOR ADMISSION
While walking his dogs in [facility], pt got tangled up in the dog leashes and fell onto right side into a wooden parking block

## 2024-04-10 NOTE — ED PROVIDER NOTE - INTERNATIONAL TRAVEL
Spoke with pt and niece. Pt rated back pain at 7 today. States it starts at lower back and radiates to her left foot. Pt states the gabapentin is helping. She would like another 7 days dose. Pt has appt with PM. Soonest she could be seen is February.    No

## 2024-04-10 NOTE — ED PROVIDER NOTE - PHYSICAL EXAMINATION
Constitutional: well appearing, NAD AAOx3  Eyes: EOMI, PERRL  Head: Normocephalic atraumatic  Mouth: no airway obstruction, posterior oropharynx clear without erythema or exudate  Neck: supple  Cardiac: regular rate and rhythm, no MRG  Resp: Lungs CTAB  GI: Abd s/nt/nd  Neuro: CN2-12 intact, strength 5/5x4, sensation grossly intact  Back: right mid thoracic mid spinal ttp with crepitus  Skin: No rashes

## 2024-04-10 NOTE — CONSULT NOTE ADULT - SUBJECTIVE AND OBJECTIVE BOX
History of Present Illness:  64y Male  PMHx of HTN presents to the ED c/o mid right back pain after falling on 4x4 plank today. Found to have right posterior rib fractures and a moderate PTX. S/p Right pigtail placement by trauma team.     PMH/PSH:        Relevant Family History  FAMILY HISTORY:      SOCIAL HISTORY:  Smoker: [ ] Yes  [ ] No        PACK YEARS:                         WHEN QUIT?  ETOH use: [ ] Yes  [ ] No              FREQUENCY / QUANTITY:  Ilicit Drug use:  [ ] Yes  [ ] No  Occupation:  Live with:  Assist device use:    MEDICATIONS  (STANDING):    MEDICATIONS  (PRN):      Allergies: No Known Allergies                                                            LABS:                        14.4   11.27 )-----------( 206      ( 10 Apr 2024 16:43 )             41.7     04-10    139  |  107  |  17  ----------------------------<  136<H>  4.6   |  28  |  1.27    Ca    9.6      10 Apr 2024 16:43    TPro  7.3  /  Alb  4.2  /  TBili  0.4  /  DBili  x   /  AST  35  /  ALT  54  /  AlkPhos  70  04-10    PT/INR - ( 10 Apr 2024 16:43 )   PT: 10.8 sec;   INR: 0.95 ratio         PTT - ( 10 Apr 2024 16:43 )  PTT:28.4 sec  Urinalysis Basic - ( 10 Apr 2024 16:43 )    Color: x / Appearance: x / SG: x / pH: x  Gluc: 136 mg/dL / Ketone: x  / Bili: x / Urobili: x   Blood: x / Protein: x / Nitrite: x   Leuk Esterase: x / RBC: x / WBC x   Sq Epi: x / Non Sq Epi: x / Bacteria: x      < from: Xray Chest 1 View-PORTABLE IMMEDIATE (Xray Chest 1 View-PORTABLE IMMEDIATE .) (04.10.24 @ 16:41) >  FINDINGS:  No previous examinations are available for review.  30-40% RIGHT lung pneumothorax without midline shift.  RIGHT seventh and eighth displaced rib fracture.  Crowding of RIGHT lower zone bronchovascular markings.  LEFT lower zone ill-defined and peripheral linear and airspace opacities.   Lower chest wall upper subcutaneous emphysema.  Upper zones clear.    < end of copied text >            Review of Systems         as per HPI    T(C): 36.5 (04-10-24 @ 17:40), Max: 36.5 (04-10-24 @ 17:40)  HR: 80 (04-10-24 @ 17:40) (70 - 80)  BP: 151/88 (04-10-24 @ 17:40) (103/64 - 151/88)  ABP: --  ABP(mean): --  RR: 19 (04-10-24 @ 17:40) (19 - 20)  SpO2: 100% (04-10-24 @ 17:40) (88% - 100%)      Physical Exam  General: , NAD                                                         Neuro: A+O x 3, non-focal, speech clear and intact                     Eyes: PERRL, EOMI   ENT: Normal exam of nasal/oral mucosa with absence of cyanosis.   Neck: supple, no JVD, trachea midline   Chest:  equal bilaterally,  no accessory muscle use note  CV: RRR,   GI: soft, NT, ND,   Extremities: no edema  SKIN: warm, dry, intact    History of Present Illness:  64y Male  PMHx of HTN, s/p nephrectomy for donation presents to the ED c/o mid right back pain after falling on 4x4 plank today. Found to have right posterior rib fractures and a moderate PTX. S/p Right pigtail placement by trauma team.   Pt seen w much pain w any movement. Pt states active lifestyle.     PMH/PSH:  nephrectomy for kidney donation  knee replacement c/b infection, spacer   left should arthroscopy  epigastric hernia repair      Relevant Family History  FAMILY HISTORY:      SOCIAL HISTORY:  Smoker: [ ] Yes  [x ] No        PACK YEARS:                         WHEN QUIT?  ETOH use: [ ] Yes  [x ] No              FREQUENCY / QUANTITY:  Ilicit Drug use:  [ ] Yes  [x ] No  Occupation: retired  Live with: wife and 3 great danes      MEDICATIONS  (STANDING):    MEDICATIONS  (PRN):      Allergies: No Known Allergies                                                            LABS:                        14.4   11.27 )-----------( 206      ( 10 Apr 2024 16:43 )             41.7     04-10    139  |  107  |  17  ----------------------------<  136<H>  4.6   |  28  |  1.27    Ca    9.6      10 Apr 2024 16:43    TPro  7.3  /  Alb  4.2  /  TBili  0.4  /  DBili  x   /  AST  35  /  ALT  54  /  AlkPhos  70  04-10    PT/INR - ( 10 Apr 2024 16:43 )   PT: 10.8 sec;   INR: 0.95 ratio         PTT - ( 10 Apr 2024 16:43 )  PTT:28.4 sec  Urinalysis Basic - ( 10 Apr 2024 16:43 )    Color: x / Appearance: x / SG: x / pH: x  Gluc: 136 mg/dL / Ketone: x  / Bili: x / Urobili: x   Blood: x / Protein: x / Nitrite: x   Leuk Esterase: x / RBC: x / WBC x   Sq Epi: x / Non Sq Epi: x / Bacteria: x      < from: Xray Chest 1 View-PORTABLE IMMEDIATE (Xray Chest 1 View-PORTABLE IMMEDIATE .) (04.10.24 @ 16:41) >  FINDINGS:  No previous examinations are available for review.  30-40% RIGHT lung pneumothorax without midline shift.  RIGHT seventh and eighth displaced rib fracture.  Crowding of RIGHT lower zone bronchovascular markings.  LEFT lower zone ill-defined and peripheral linear and airspace opacities.   Lower chest wall upper subcutaneous emphysema.  Upper zones clear.    < end of copied text >    < from: CT Chest w/ IV Cont (04.10.24 @ 17:41) >    FINDINGS:  CHEST:  LUNGS AND LARGE AIRWAYS: Patent central airways. Bibasilar atelectasis.  PLEURA: Small left effusion. Right-sided chest tube with persistent small   pneumohemothorax  VESSELS: Within normal limits.  HEART: Heart size is normal. No pericardial effusion.  MEDIASTINUM AND JORDANA: No lymphadenopathy.  CHEST WALL AND LOWER NECK: Extensive right chest wall subcutaneous   emphysema.    ABDOMEN AND PELVIS:  LIVER: Within normal limits.  BILE DUCTS: Normal caliber.  GALLBLADDER: Within normal limits.  SPLEEN: Within normal limits.  PANCREAS: Within normal limits.  ADRENALS: Within normal limits.  KIDNEYS/URETERS: Normal right kidney. Left kidney is absent.    BLADDER: Within normal limits.  REPRODUCTIVE ORGANS: Prostate is enlarged.    BOWEL: No bowel obstruction. Chronic diverticulosis without   diverticulitis. Normal appendix.  PERITONEUM: No ascites.  VESSELS: Atherosclerotic changes.  RETROPERITONEUM/LYMPH NODES: No lymphadenopathy.  ABDOMINAL WALL: Fat-containing umbilical hernia.  BONES: Degenerative changes. There are markedly displaced fractures of   the right posterior ninth through seventh ribs.    IMPRESSION:    There are markedly displaced fractures of the right posterior ninth   through seventh ribs.    Right chest tube in place. Small persistent right hemopneumothorax.    Extensive right chest wall subcutaneous emphysema.    < end of copied text >          Review of Systems         as per HPI    T(C): 36.5 (04-10-24 @ 17:40), Max: 36.5 (04-10-24 @ 17:40)  HR: 80 (04-10-24 @ 17:40) (70 - 80)  BP: 151/88 (04-10-24 @ 17:40) (103/64 - 151/88)  ABP: --  ABP(mean): --  RR: 19 (04-10-24 @ 17:40) (19 - 20)  SpO2: 100% (04-10-24 @ 17:40) (88% - 100%)      Physical Exam  General: , NAD                                                         Neuro: A+O x 3, non-focal, speech clear and intact                     Eyes: PERRL, EOMI   ENT: Normal exam of nasal/oral mucosa with absence of cyanosis.   Neck: supple, no JVD, trachea midline   Chest:  equal bilaterally,  no accessory muscle use note, much tenderness right posterior chest  CV: RRR,   GI: soft, NT, ND,   Extremities: no edema  SKIN: warm, dry, intact

## 2024-04-10 NOTE — ED PROVIDER NOTE - PROGRESS NOTE DETAILS
signed Fior Portillo PA-C   Pt with PTX on cxr. I spoke to trauma attending Dr Hampton for consult. signed CANDACE Sarah Dr in ED, consenting pt for chest tube. signed Fior Portillo PA-C   Pt just returned from CT scan with Dr Hampton. Chest tube placed. Admit to his service, stepdown for rib fxs, PTX. Pt agrees with admission and plan of care. I also spoke to both thoracic and ICU PAs who will see pt in consult. signed Fior Portillo PA-C   Thoracic PA Arsenio saw pt in ED. Plan surgical repair of ribs tomorrow. Pt may have regular diet, NPO after midnight.

## 2024-04-10 NOTE — CONSULT NOTE ADULT - ASSESSMENT
Assessment     1) Right pneumothorax  2) Right Hemothorax   3) 7-9 displaced rib fracture  Assessment     1) Right pneumothorax  2) Right Hemothorax   3) 7-9 displaced rib fracture    Plan  - Admit to SICU for further management  - Keep R pigtail to suction  - Pain control with Tylenol and oxycodone PRN  - Incentive spirometry  - Continue home Crestor cephalexin  - F/u AM CBC, CMP, mag, phos  - Thoracic service consulted    " Time spent on this patient encounter, which includes documenting this note in the electronic medical record, was 62 minutes including assessing the presenting problems with associated risks, reviewing the medical record to prepare for the encounter, and meeting face to face with patient to obtain additional history. I have also performed an appropriate physical exam, made interventions listed and ordered and interpreted appropriate diagnostic studies as documented. To improve communication and patient saftey, I have coordinated care with the multidisciplinary team including the bedside nurse, appropriate attending of record and consultants as needed. " Assessment     1) Right pneumothorax  2) Right Hemothorax   3) 7-9 displaced rib fracture    Plan  - Admit to SICU for further management  - Keep R pigtail to suction  - Pain control with Tylenol and oxycodone PRN  - Incentive spirometry  - Continue home Crestor cephalexin  - F/u AM CXR  - F/u AM CBC, CMP, mag, phos  - Thoracic service consulted    " Time spent on this patient encounter, which includes documenting this note in the electronic medical record, was 62 minutes including assessing the presenting problems with associated risks, reviewing the medical record to prepare for the encounter, and meeting face to face with patient to obtain additional history. I have also performed an appropriate physical exam, made interventions listed and ordered and interpreted appropriate diagnostic studies as documented. To improve communication and patient saftey, I have coordinated care with the multidisciplinary team including the bedside nurse, appropriate attending of record and consultants as needed. "

## 2024-04-10 NOTE — ED PROVIDER NOTE - ATTENDING APP SHARED VISIT CONTRIBUTION OF CARE
I, Joan Hernandez DO, personally saw the patient with the PA, and completed the key components of the history and physical exam. I then discussed the management plan with the PA.    Elements for critical care include direct patient care (not related to procedure), additional history taking, interpretation of diagnostic studies, documentation, consultation with other physicians, consult w/ pt's family directly relating to pts condition

## 2024-04-10 NOTE — CONSULT NOTE ADULT - ASSESSMENT
64y Male  PMHx of HTN presents to the ED c/o mid right back pain after falling on 4x4 plank today. Found to have right posterior rib fractures and a moderate PTX. S/p Right pigtail placement    cont chest tube to suction  pain control  encourage IS and deep breathing  Dr. Perez to review images  64y Male  PMHx of HTN presents to the ED c/o mid right back pain after falling on 4x4 plank today. Found to have right posterior rib fractures and a moderate PTX. S/p Right pigtail placement    cont chest tube to suction  pain control  encourage IS and deep breathing  Dr. Perez reviewed images, displaced right posterior 7-9 rib fractures would benefit from rib plating as pt is active and in much pain. D/w Pt  NPO p MN for possible VATS rib plating in AM  T and S done, coags normal  d/w pt and wife at bedside, will d/w Dr. Perez in AM  gentle hydration after midnight ordered

## 2024-04-10 NOTE — ED ADULT NURSE NOTE - NSFALLRISKINTERV_ED_ALL_ED

## 2024-04-10 NOTE — ED PROVIDER NOTE - OBJECTIVE STATEMENT
63 y/o male with PMHx of HTN presents to the ED c/o mid right back pain after falling on 4x4 plank. Pt states his dog wrapped leash around his leg causing him to fall currently c/o difficult breathing. no headache, no chest pain, no numbness, tingling weakness, abdominal pain.

## 2024-04-10 NOTE — ED PROVIDER NOTE - CLINICAL SUMMARY MEDICAL DECISION MAKING FREE TEXT BOX
Pt s/p fall c/o difficulty breathing, right mis thoracic ttp with crepitus, Plan for EKG, CXR, labs, CT imaging, pain control, trauma consult admission Pt s/p fall c/o difficulty breathing, right mis thoracic ttp with crepitus, Plan for EKG, CXR, labs, CT imaging, pain control, trauma consult admission    signed Fior Portillo PA-C   pt with multiple rib fxs and PTX. The xray images were personally reviewed individually by me. Chest tube placed by sx Dr Hampton. Admit to his service, step down.

## 2024-04-10 NOTE — ED ADULT NURSE NOTE - NSFALLTYPE_ED_ALL_ED
APPTS ARE READY TO BE MADE: [x] YES    Best Family or Patient Contact (if needed): Denise Iraheta [phone] - number is incorrect/wrong contact information    Additional Information about above appointments (if needed):    1: Pediatric Neurology  2: Pediatric ENT  3: Pediatric Surgery    Patient is being discharged to rehab/hospice. Caregiver will arrange follow up.-st aleman  Other comments or requests:    Accidental fall

## 2024-04-10 NOTE — CONSULT NOTE ADULT - SUBJECTIVE AND OBJECTIVE BOX
Date of entry of this note is equal to the date of services rendered    65 y/o male with PMHx of HTN presents to the ED c/o mid right back pain after falling on 4x4 plank. Pt states his dog wrapped leash around his leg causing him to fall currently c/o difficult breathing. no headache, no chest pain, no numbness, tingling weakness, abdominal pain. ICU consulted for placement to SICU for management of chest tube.     PAST MEDICAL & SURGICAL HISTORY:      Review of Systems:  Negative unless stated      Medications:        acetaminophen     Tablet .. 975 milliGRAM(s) Oral every 8 hours  morphine  - Injectable 3 milliGRAM(s) IV Push every 3 hours PRN  oxyCODONE    IR 5 milliGRAM(s) Oral every 4 hours PRN  oxyCODONE    IR 10 milliGRAM(s) Oral every 6 hours PRN                  lidocaine   4% Patch 1 Patch Transdermal daily            ICU Vital Signs Last 24 Hrs  T(C): 36.5 (10 Apr 2024 17:40), Max: 36.5 (10 Apr 2024 17:40)  T(F): 97.7 (10 Apr 2024 17:40), Max: 97.7 (10 Apr 2024 17:40)  HR: 80 (10 Apr 2024 17:40) (70 - 80)  BP: 151/88 (10 Apr 2024 17:40) (103/64 - 151/88)  BP(mean): --  ABP: --  ABP(mean): --  RR: 19 (10 Apr 2024 17:40) (19 - 20)  SpO2: 100% (10 Apr 2024 17:40) (88% - 100%)    O2 Parameters below as of 10 Apr 2024 17:40  Patient On (Oxygen Delivery Method): nasal cannula  O2 Flow (L/min): 2              I&O's Detail        LABS:                        14.4   11.27 )-----------( 206      ( 10 Apr 2024 16:43 )             41.7     04-10    139  |  107  |  17  ----------------------------<  136<H>  4.6   |  28  |  1.27    Ca    9.6      10 Apr 2024 16:43    TPro  7.3  /  Alb  4.2  /  TBili  0.4  /  DBili  x   /  AST  35  /  ALT  54  /  AlkPhos  70  04-10          CAPILLARY BLOOD GLUCOSE        PT/INR - ( 10 Apr 2024 16:43 )   PT: 10.8 sec;   INR: 0.95 ratio         PTT - ( 10 Apr 2024 16:43 )  PTT:28.4 sec  Urinalysis Basic - ( 10 Apr 2024 16:43 )    Color: x / Appearance: x / SG: x / pH: x  Gluc: 136 mg/dL / Ketone: x  / Bili: x / Urobili: x   Blood: x / Protein: x / Nitrite: x   Leuk Esterase: x / RBC: x / WBC x   Sq Epi: x / Non Sq Epi: x / Bacteria: x      CULTURES:      Physical Examination:    General: No acute distress.      HEENT: Pupils equal, reactive to light.  Symmetric.    PULM: Clear to auscultation bilaterally, no significant sputum production. R pigtail chest tube    CVS: Regular rate and rhythm    ABD: Soft, nondistended, nontender    EXT: No edema, nontender    SKIN: Warm and well perfused, no rashes noted.    NEURO: Alert, oriented, interactive, nonfocal      < from: CT Abdomen and Pelvis w/ IV Cont (04.10.24 @ 17:41) >  IMPRESSION:    There are markedly displaced fractures of the right posterior ninth   through seventh ribs.    Right chest tube in place. Small persistent right hemopneumothorax.    Extensive right chest wall subcutaneous emphysema.        --- End of Report ---            EVITA MCLAUGHLIN MD; Attending Radiologist  This document has been electronically signed. Apr 10 2024  6:04PM    < end of copied text >         Date of entry of this note is equal to the date of services rendered    63 y/o male with PMHx of HLD presented to the ED c/o mid right back pain after falling on 4x4 plank. Pt states his dog wrapped leash around his leg causing him to fall currently c/o difficult breathing. Pt without headache, no chest pain, no numbness, tingling weakness, abdominal pain. ICU consulted for placement to SICU for management of chest tube. Pt seen and evaluated at the bedside, in no acute distress. Pt admits to pain to right chest wall when breathing in deep and with palpation. Pt with R pigtail in place with air leak. Pt recently seen by urgent care yesterday for skin tear and prescribed Cephalexin and given tetanus shot. Pt on 3L NC with spo2 98% and no increased work of breathing noted.    PAST MEDICAL & SURGICAL HISTORY:      Review of Systems:  Negative unless stated      Medications:        acetaminophen     Tablet .. 975 milliGRAM(s) Oral every 8 hours  morphine  - Injectable 3 milliGRAM(s) IV Push every 3 hours PRN  oxyCODONE    IR 5 milliGRAM(s) Oral every 4 hours PRN  oxyCODONE    IR 10 milliGRAM(s) Oral every 6 hours PRN                  lidocaine   4% Patch 1 Patch Transdermal daily            ICU Vital Signs Last 24 Hrs  T(C): 36.5 (10 Apr 2024 17:40), Max: 36.5 (10 Apr 2024 17:40)  T(F): 97.7 (10 Apr 2024 17:40), Max: 97.7 (10 Apr 2024 17:40)  HR: 80 (10 Apr 2024 17:40) (70 - 80)  BP: 151/88 (10 Apr 2024 17:40) (103/64 - 151/88)  BP(mean): --  ABP: --  ABP(mean): --  RR: 19 (10 Apr 2024 17:40) (19 - 20)  SpO2: 100% (10 Apr 2024 17:40) (88% - 100%)    O2 Parameters below as of 10 Apr 2024 17:40  Patient On (Oxygen Delivery Method): nasal cannula  O2 Flow (L/min): 2              I&O's Detail        LABS:                        14.4   11.27 )-----------( 206      ( 10 Apr 2024 16:43 )             41.7     04-10    139  |  107  |  17  ----------------------------<  136<H>  4.6   |  28  |  1.27    Ca    9.6      10 Apr 2024 16:43    TPro  7.3  /  Alb  4.2  /  TBili  0.4  /  DBili  x   /  AST  35  /  ALT  54  /  AlkPhos  70  04-10          CAPILLARY BLOOD GLUCOSE        PT/INR - ( 10 Apr 2024 16:43 )   PT: 10.8 sec;   INR: 0.95 ratio         PTT - ( 10 Apr 2024 16:43 )  PTT:28.4 sec  Urinalysis Basic - ( 10 Apr 2024 16:43 )    Color: x / Appearance: x / SG: x / pH: x  Gluc: 136 mg/dL / Ketone: x  / Bili: x / Urobili: x   Blood: x / Protein: x / Nitrite: x   Leuk Esterase: x / RBC: x / WBC x   Sq Epi: x / Non Sq Epi: x / Bacteria: x      CULTURES:      Physical Examination:    General: No acute distress.      HEENT: Pupils equal, reactive to light.  Symmetric.    PULM: breath sounds diminished to RLL. R pigtail placed, on suction with air leak    CVS: Regular rate and rhythm    ABD: Soft, nondistended, nontender    EXT: No edema, nontender    SKIN: Warm and well perfused    NEURO: Alert, oriented, interactive, nonfocal      < from: CT Abdomen and Pelvis w/ IV Cont (04.10.24 @ 17:41) >  IMPRESSION:    There are markedly displaced fractures of the right posterior ninth   through seventh ribs.    Right chest tube in place. Small persistent right hemopneumothorax.    Extensive right chest wall subcutaneous emphysema.        --- End of Report ---            EVITA MCLAUGHLIN MD; Attending Radiologist  This document has been electronically signed. Apr 10 2024  6:04PM    < end of copied text >

## 2024-04-10 NOTE — PATIENT PROFILE ADULT - FALL HARM RISK - HARM RISK INTERVENTIONS

## 2024-04-10 NOTE — ED ADULT NURSE NOTE - OBJECTIVE STATEMENT
145
Patient BIBA to the ER with complaints of trip and fall while walking dogs. Patient fell and landed on his back, denies head strike, blood thinner use. Patient received 100 fentanyl intranasally and 100 fentanyl IV, and zofran with EMS. Patient 88-91% on room air. Placed on 2L NC, pt with pneumothorax on imaging, trauma surgery bedside for right chest tube, pt taken to CT scan.

## 2024-04-11 LAB
ALBUMIN SERPL ELPH-MCNC: 3.3 G/DL — SIGNIFICANT CHANGE UP (ref 3.3–5)
ALBUMIN SERPL ELPH-MCNC: 3.4 G/DL — SIGNIFICANT CHANGE UP (ref 3.3–5)
ALP SERPL-CCNC: 53 U/L — SIGNIFICANT CHANGE UP (ref 40–120)
ALP SERPL-CCNC: 53 U/L — SIGNIFICANT CHANGE UP (ref 40–120)
ALT FLD-CCNC: 37 U/L — SIGNIFICANT CHANGE UP (ref 12–78)
ALT FLD-CCNC: 42 U/L — SIGNIFICANT CHANGE UP (ref 12–78)
ANION GAP SERPL CALC-SCNC: 5 MMOL/L — SIGNIFICANT CHANGE UP (ref 5–17)
ANION GAP SERPL CALC-SCNC: 6 MMOL/L — SIGNIFICANT CHANGE UP (ref 5–17)
AST SERPL-CCNC: 22 U/L — SIGNIFICANT CHANGE UP (ref 15–37)
AST SERPL-CCNC: 24 U/L — SIGNIFICANT CHANGE UP (ref 15–37)
BASOPHILS # BLD AUTO: 0.03 K/UL — SIGNIFICANT CHANGE UP (ref 0–0.2)
BASOPHILS NFR BLD AUTO: 0.4 % — SIGNIFICANT CHANGE UP (ref 0–2)
BILIRUB SERPL-MCNC: 0.6 MG/DL — SIGNIFICANT CHANGE UP (ref 0.2–1.2)
BILIRUB SERPL-MCNC: 0.7 MG/DL — SIGNIFICANT CHANGE UP (ref 0.2–1.2)
BUN SERPL-MCNC: 14 MG/DL — SIGNIFICANT CHANGE UP (ref 7–23)
BUN SERPL-MCNC: 15 MG/DL — SIGNIFICANT CHANGE UP (ref 7–23)
CALCIUM SERPL-MCNC: 8.1 MG/DL — LOW (ref 8.5–10.1)
CALCIUM SERPL-MCNC: 8.4 MG/DL — LOW (ref 8.5–10.1)
CHLORIDE SERPL-SCNC: 106 MMOL/L — SIGNIFICANT CHANGE UP (ref 96–108)
CHLORIDE SERPL-SCNC: 107 MMOL/L — SIGNIFICANT CHANGE UP (ref 96–108)
CO2 SERPL-SCNC: 25 MMOL/L — SIGNIFICANT CHANGE UP (ref 22–31)
CO2 SERPL-SCNC: 26 MMOL/L — SIGNIFICANT CHANGE UP (ref 22–31)
CREAT SERPL-MCNC: 1.14 MG/DL — SIGNIFICANT CHANGE UP (ref 0.5–1.3)
CREAT SERPL-MCNC: 1.17 MG/DL — SIGNIFICANT CHANGE UP (ref 0.5–1.3)
EGFR: 69 ML/MIN/1.73M2 — SIGNIFICANT CHANGE UP
EGFR: 71 ML/MIN/1.73M2 — SIGNIFICANT CHANGE UP
EOSINOPHIL # BLD AUTO: 0.2 K/UL — SIGNIFICANT CHANGE UP (ref 0–0.5)
EOSINOPHIL NFR BLD AUTO: 2.9 % — SIGNIFICANT CHANGE UP (ref 0–6)
GLUCOSE SERPL-MCNC: 127 MG/DL — HIGH (ref 70–99)
GLUCOSE SERPL-MCNC: 132 MG/DL — HIGH (ref 70–99)
HCT VFR BLD CALC: 34.5 % — LOW (ref 39–50)
HCT VFR BLD CALC: 34.7 % — LOW (ref 39–50)
HCT VFR BLD CALC: 35 % — LOW (ref 39–50)
HCT VFR BLD CALC: 35 % — LOW (ref 39–50)
HGB BLD-MCNC: 11.6 G/DL — LOW (ref 13–17)
HGB BLD-MCNC: 11.7 G/DL — LOW (ref 13–17)
HGB BLD-MCNC: 12.1 G/DL — LOW (ref 13–17)
HGB BLD-MCNC: 12.1 G/DL — LOW (ref 13–17)
IMM GRANULOCYTES NFR BLD AUTO: 0.3 % — SIGNIFICANT CHANGE UP (ref 0–0.9)
LYMPHOCYTES # BLD AUTO: 0.92 K/UL — LOW (ref 1–3.3)
LYMPHOCYTES # BLD AUTO: 13.4 % — SIGNIFICANT CHANGE UP (ref 13–44)
MAGNESIUM SERPL-MCNC: 2.2 MG/DL — SIGNIFICANT CHANGE UP (ref 1.6–2.6)
MAGNESIUM SERPL-MCNC: 2.2 MG/DL — SIGNIFICANT CHANGE UP (ref 1.6–2.6)
MCHC RBC-ENTMCNC: 33.6 GM/DL — SIGNIFICANT CHANGE UP (ref 32–36)
MCHC RBC-ENTMCNC: 33.7 GM/DL — SIGNIFICANT CHANGE UP (ref 32–36)
MCHC RBC-ENTMCNC: 33.7 PG — SIGNIFICANT CHANGE UP (ref 27–34)
MCHC RBC-ENTMCNC: 33.7 PG — SIGNIFICANT CHANGE UP (ref 27–34)
MCHC RBC-ENTMCNC: 34.4 PG — HIGH (ref 27–34)
MCHC RBC-ENTMCNC: 34.6 GM/DL — SIGNIFICANT CHANGE UP (ref 32–36)
MCHC RBC-ENTMCNC: 34.6 GM/DL — SIGNIFICANT CHANGE UP (ref 32–36)
MCHC RBC-ENTMCNC: 34.6 PG — HIGH (ref 27–34)
MCV RBC AUTO: 100 FL — SIGNIFICANT CHANGE UP (ref 80–100)
MCV RBC AUTO: 100 FL — SIGNIFICANT CHANGE UP (ref 80–100)
MCV RBC AUTO: 100.3 FL — HIGH (ref 80–100)
MCV RBC AUTO: 99.4 FL — SIGNIFICANT CHANGE UP (ref 80–100)
MONOCYTES # BLD AUTO: 0.73 K/UL — SIGNIFICANT CHANGE UP (ref 0–0.9)
MONOCYTES NFR BLD AUTO: 10.6 % — SIGNIFICANT CHANGE UP (ref 2–14)
NEUTROPHILS # BLD AUTO: 4.97 K/UL — SIGNIFICANT CHANGE UP (ref 1.8–7.4)
NEUTROPHILS NFR BLD AUTO: 72.4 % — SIGNIFICANT CHANGE UP (ref 43–77)
PHOSPHATE SERPL-MCNC: 2.8 MG/DL — SIGNIFICANT CHANGE UP (ref 2.5–4.5)
PHOSPHATE SERPL-MCNC: 3.7 MG/DL — SIGNIFICANT CHANGE UP (ref 2.5–4.5)
PLATELET # BLD AUTO: 147 K/UL — LOW (ref 150–400)
PLATELET # BLD AUTO: 158 K/UL — SIGNIFICANT CHANGE UP (ref 150–400)
PLATELET # BLD AUTO: 159 K/UL — SIGNIFICANT CHANGE UP (ref 150–400)
PLATELET # BLD AUTO: 164 K/UL — SIGNIFICANT CHANGE UP (ref 150–400)
POTASSIUM SERPL-MCNC: 3.7 MMOL/L — SIGNIFICANT CHANGE UP (ref 3.5–5.3)
POTASSIUM SERPL-MCNC: 4.4 MMOL/L — SIGNIFICANT CHANGE UP (ref 3.5–5.3)
POTASSIUM SERPL-SCNC: 3.7 MMOL/L — SIGNIFICANT CHANGE UP (ref 3.5–5.3)
POTASSIUM SERPL-SCNC: 4.4 MMOL/L — SIGNIFICANT CHANGE UP (ref 3.5–5.3)
PROT SERPL-MCNC: 5.9 GM/DL — LOW (ref 6–8.3)
PROT SERPL-MCNC: 6 GM/DL — SIGNIFICANT CHANGE UP (ref 6–8.3)
RBC # BLD: 3.44 M/UL — LOW (ref 4.2–5.8)
RBC # BLD: 3.47 M/UL — LOW (ref 4.2–5.8)
RBC # BLD: 3.5 M/UL — LOW (ref 4.2–5.8)
RBC # BLD: 3.52 M/UL — LOW (ref 4.2–5.8)
RBC # FLD: 12.5 % — SIGNIFICANT CHANGE UP (ref 10.3–14.5)
RBC # FLD: 12.5 % — SIGNIFICANT CHANGE UP (ref 10.3–14.5)
RBC # FLD: 12.6 % — SIGNIFICANT CHANGE UP (ref 10.3–14.5)
RBC # FLD: 12.6 % — SIGNIFICANT CHANGE UP (ref 10.3–14.5)
SODIUM SERPL-SCNC: 137 MMOL/L — SIGNIFICANT CHANGE UP (ref 135–145)
SODIUM SERPL-SCNC: 138 MMOL/L — SIGNIFICANT CHANGE UP (ref 135–145)
WBC # BLD: 6.87 K/UL — SIGNIFICANT CHANGE UP (ref 3.8–10.5)
WBC # BLD: 7.51 K/UL — SIGNIFICANT CHANGE UP (ref 3.8–10.5)
WBC # BLD: 7.52 K/UL — SIGNIFICANT CHANGE UP (ref 3.8–10.5)
WBC # BLD: 8.26 K/UL — SIGNIFICANT CHANGE UP (ref 3.8–10.5)
WBC # FLD AUTO: 6.87 K/UL — SIGNIFICANT CHANGE UP (ref 3.8–10.5)
WBC # FLD AUTO: 7.51 K/UL — SIGNIFICANT CHANGE UP (ref 3.8–10.5)
WBC # FLD AUTO: 7.52 K/UL — SIGNIFICANT CHANGE UP (ref 3.8–10.5)
WBC # FLD AUTO: 8.26 K/UL — SIGNIFICANT CHANGE UP (ref 3.8–10.5)

## 2024-04-11 PROCEDURE — ZZZZZ: CPT

## 2024-04-11 PROCEDURE — 99233 SBSQ HOSP IP/OBS HIGH 50: CPT

## 2024-04-11 PROCEDURE — 93010 ELECTROCARDIOGRAM REPORT: CPT

## 2024-04-11 PROCEDURE — 99232 SBSQ HOSP IP/OBS MODERATE 35: CPT

## 2024-04-11 PROCEDURE — 71045 X-RAY EXAM CHEST 1 VIEW: CPT | Mod: 26

## 2024-04-11 RX ORDER — POLYETHYLENE GLYCOL 3350 17 G/17G
17 POWDER, FOR SOLUTION ORAL DAILY
Refills: 0 | Status: DISCONTINUED | OUTPATIENT
Start: 2024-04-11 | End: 2024-04-17

## 2024-04-11 RX ORDER — ACETAMINOPHEN 500 MG
1000 TABLET ORAL ONCE
Refills: 0 | Status: DISCONTINUED | OUTPATIENT
Start: 2024-04-11 | End: 2024-04-17

## 2024-04-11 RX ORDER — SODIUM CHLORIDE 9 MG/ML
1000 INJECTION INTRAMUSCULAR; INTRAVENOUS; SUBCUTANEOUS
Refills: 0 | Status: DISCONTINUED | OUTPATIENT
Start: 2024-04-11 | End: 2024-04-13

## 2024-04-11 RX ORDER — ACETAMINOPHEN 500 MG
1000 TABLET ORAL ONCE
Refills: 0 | Status: COMPLETED | OUTPATIENT
Start: 2024-04-11 | End: 2024-04-11

## 2024-04-11 RX ORDER — HYDROMORPHONE HYDROCHLORIDE 2 MG/ML
0.5 INJECTION INTRAMUSCULAR; INTRAVENOUS; SUBCUTANEOUS EVERY 4 HOURS
Refills: 0 | Status: DISCONTINUED | OUTPATIENT
Start: 2024-04-11 | End: 2024-04-15

## 2024-04-11 RX ORDER — TRAMADOL HYDROCHLORIDE 50 MG/1
50 TABLET ORAL EVERY 6 HOURS
Refills: 0 | Status: DISCONTINUED | OUTPATIENT
Start: 2024-04-11 | End: 2024-04-15

## 2024-04-11 RX ORDER — HYDROMORPHONE HYDROCHLORIDE 2 MG/ML
1 INJECTION INTRAMUSCULAR; INTRAVENOUS; SUBCUTANEOUS EVERY 6 HOURS
Refills: 0 | Status: DISCONTINUED | OUTPATIENT
Start: 2024-04-11 | End: 2024-04-15

## 2024-04-11 RX ORDER — HYDROMORPHONE HYDROCHLORIDE 2 MG/ML
1 INJECTION INTRAMUSCULAR; INTRAVENOUS; SUBCUTANEOUS ONCE
Refills: 0 | Status: DISCONTINUED | OUTPATIENT
Start: 2024-04-11 | End: 2024-04-11

## 2024-04-11 RX ORDER — SENNA PLUS 8.6 MG/1
2 TABLET ORAL AT BEDTIME
Refills: 0 | Status: DISCONTINUED | OUTPATIENT
Start: 2024-04-11 | End: 2024-04-17

## 2024-04-11 RX ORDER — ATORVASTATIN CALCIUM 80 MG/1
20 TABLET, FILM COATED ORAL AT BEDTIME
Refills: 0 | Status: DISCONTINUED | OUTPATIENT
Start: 2024-04-11 | End: 2024-04-17

## 2024-04-11 RX ADMIN — TRAMADOL HYDROCHLORIDE 50 MILLIGRAM(S): 50 TABLET ORAL at 16:17

## 2024-04-11 RX ADMIN — LIDOCAINE 1 PATCH: 4 CREAM TOPICAL at 21:42

## 2024-04-11 RX ADMIN — Medication 1000 MILLIGRAM(S): at 17:18

## 2024-04-11 RX ADMIN — ATORVASTATIN CALCIUM 20 MILLIGRAM(S): 80 TABLET, FILM COATED ORAL at 21:46

## 2024-04-11 RX ADMIN — Medication 975 MILLIGRAM(S): at 06:29

## 2024-04-11 RX ADMIN — HYDROMORPHONE HYDROCHLORIDE 1 MILLIGRAM(S): 2 INJECTION INTRAMUSCULAR; INTRAVENOUS; SUBCUTANEOUS at 13:43

## 2024-04-11 RX ADMIN — HYDROMORPHONE HYDROCHLORIDE 1 MILLIGRAM(S): 2 INJECTION INTRAMUSCULAR; INTRAVENOUS; SUBCUTANEOUS at 13:00

## 2024-04-11 RX ADMIN — Medication 500 MILLIGRAM(S): at 09:22

## 2024-04-11 RX ADMIN — Medication 975 MILLIGRAM(S): at 22:15

## 2024-04-11 RX ADMIN — OXYCODONE HYDROCHLORIDE 10 MILLIGRAM(S): 5 TABLET ORAL at 04:06

## 2024-04-11 RX ADMIN — Medication 400 MILLIGRAM(S): at 16:27

## 2024-04-11 RX ADMIN — LIDOCAINE 1 PATCH: 4 CREAM TOPICAL at 06:29

## 2024-04-11 RX ADMIN — HYDROMORPHONE HYDROCHLORIDE 1 MILLIGRAM(S): 2 INJECTION INTRAMUSCULAR; INTRAVENOUS; SUBCUTANEOUS at 01:03

## 2024-04-11 RX ADMIN — SODIUM CHLORIDE 50 MILLILITER(S): 9 INJECTION, SOLUTION INTRAVENOUS at 00:47

## 2024-04-11 RX ADMIN — HYDROMORPHONE HYDROCHLORIDE 0.5 MILLIGRAM(S): 2 INJECTION INTRAMUSCULAR; INTRAVENOUS; SUBCUTANEOUS at 18:49

## 2024-04-11 RX ADMIN — TRAMADOL HYDROCHLORIDE 50 MILLIGRAM(S): 50 TABLET ORAL at 17:17

## 2024-04-11 RX ADMIN — LIDOCAINE 1 PATCH: 4 CREAM TOPICAL at 20:23

## 2024-04-11 RX ADMIN — OXYCODONE HYDROCHLORIDE 10 MILLIGRAM(S): 5 TABLET ORAL at 03:45

## 2024-04-11 RX ADMIN — Medication 975 MILLIGRAM(S): at 05:10

## 2024-04-11 RX ADMIN — SENNA PLUS 2 TABLET(S): 8.6 TABLET ORAL at 21:46

## 2024-04-11 RX ADMIN — Medication 500 MILLIGRAM(S): at 21:46

## 2024-04-11 RX ADMIN — Medication 975 MILLIGRAM(S): at 21:44

## 2024-04-11 RX ADMIN — HYDROMORPHONE HYDROCHLORIDE 1 MILLIGRAM(S): 2 INJECTION INTRAMUSCULAR; INTRAVENOUS; SUBCUTANEOUS at 00:41

## 2024-04-11 RX ADMIN — LIDOCAINE 1 PATCH: 4 CREAM TOPICAL at 09:23

## 2024-04-11 RX ADMIN — HYDROMORPHONE HYDROCHLORIDE 0.5 MILLIGRAM(S): 2 INJECTION INTRAMUSCULAR; INTRAVENOUS; SUBCUTANEOUS at 19:15

## 2024-04-11 NOTE — H&P ADULT - NSHPLABSRESULTS_GEN_ALL_CORE
LABS:                        14.4   11.27 )-----------( 206      ( 10 Apr 2024 16:43 )             41.7     04-10    139  |  107  |  17  ----------------------------<  136<H>  4.6   |  28  |  1.27    Ca    9.6      10 Apr 2024 16:43    TPro  7.3  /  Alb  4.2  /  TBili  0.4  /  DBili  x   /  AST  35  /  ALT  54  /  AlkPhos  70  04-10    PT/INR - ( 10 Apr 2024 16:43 )   PT: 10.8 sec;   INR: 0.95 ratio         PTT - ( 10 Apr 2024 16:43 )  PTT:28.4 sec  Urinalysis Basic - ( 10 Apr 2024 16:43 )    Color: x / Appearance: x / SG: x / pH: x  Gluc: 136 mg/dL / Ketone: x  / Bili: x / Urobili: x   Blood: x / Protein: x / Nitrite: x   Leuk Esterase: x / RBC: x / WBC x   Sq Epi: x / Non Sq Epi: x / Bacteria: x    < from: Xray Chest 1 View-PORTABLE IMMEDIATE (Xray Chest 1 View-PORTABLE IMMEDIATE .) (04.10.24 @ 16:41) >  FINDINGS:  No previous examinations are available for review.  30-40% RIGHT lung pneumothorax without midline shift.  RIGHT seventh and eighth displaced rib fracture.  Crowding of RIGHT lower zone bronchovascular markings.  LEFT lower zone ill-defined and peripheral linear and airspace opacities.   Lower chest wall upper subcutaneous emphysema.  Upper zones clear.    < end of copied text >    < from: CT Chest w/ IV Cont (04.10.24 @ 17:41) >    FINDINGS:  CHEST:  LUNGS AND LARGE AIRWAYS: Patent central airways. Bibasilar atelectasis.  PLEURA: Small left effusion. Right-sided chest tube with persistent small   pneumohemothorax  VESSELS: Within normal limits.  HEART: Heart size is normal. No pericardial effusion.  MEDIASTINUM AND JODRANA: No lymphadenopathy.  CHEST WALL AND LOWER NECK: Extensive right chest wall subcutaneous   emphysema.    ABDOMEN AND PELVIS:  LIVER: Within normal limits.  BILE DUCTS: Normal caliber.  GALLBLADDER: Within normal limits.  SPLEEN: Within normal limits.  PANCREAS: Within normal limits.  ADRENALS: Within normal limits.  KIDNEYS/URETERS: Normal right kidney. Left kidney is absent.    BLADDER: Within normal limits.  REPRODUCTIVE ORGANS: Prostate is enlarged.    BOWEL: No bowel obstruction. Chronic diverticulosis without   diverticulitis. Normal appendix.  PERITONEUM: No ascites.  VESSELS: Atherosclerotic changes.  RETROPERITONEUM/LYMPH NODES: No lymphadenopathy.  ABDOMINAL WALL: Fat-containing umbilical hernia.  BONES: Degenerative changes. There are markedly displaced fractures of   the right posterior ninth through seventh ribs.    IMPRESSION:    There are markedly displaced fractures of the right posterior ninth   through seventh ribs.    Right chest tube in place. Small persistent right hemopneumothorax.    Extensive right chest wall subcutaneous emphysema.    < end of copied text >

## 2024-04-11 NOTE — H&P ADULT - NSHPPHYSICALEXAM_GEN_ALL_CORE
Physical Exam  General: , NAD                                                         Neuro: A+O x 3, non-focal, speech clear and intact                     Eyes: PERRL, EOMI   ENT: Normal exam of nasal/oral mucosa with absence of cyanosis.   Neck: supple, no JVD, trachea midline   Chest:  equal bilaterally,  no accessory muscle use note, much tenderness right posterior chest, pigtail to suction, no AL  CV: RRR,   GI: soft, NT, ND,   Extremities: no edema  SKIN: warm, dry, intact

## 2024-04-11 NOTE — H&P ADULT - ATTENDING COMMENTS
A/P:  Right pneumothorax  Right 7-9th rib fractures  S/P right pigtail chest tube   Pain control  Thoracic surgery on consult  ICU on consult for SICU mgmt  F/U labs, cxr

## 2024-04-11 NOTE — PROGRESS NOTE ADULT - SUBJECTIVE AND OBJECTIVE BOX
Date of entry of this note is equal to the date of services rendered   Patient is a 64y old  Male who presents with a chief complaint of   ROLY FROST   64y    Male  All allergies reviewed No Known Allergies      BRIEF HOSPITAL COURSE:    Interval History:     PAST MEDICAL & SURGICAL HISTORY:      Review of Systems:                       All other ROS are negative    Medications:  MEDICATIONS  (STANDING):  acetaminophen     Tablet .. 975 milliGRAM(s) Oral every 8 hours  cephalexin 500 milliGRAM(s) Oral every 12 hours  dextrose 5% + sodium chloride 0.45%. 1000 milliLiter(s) (50 mL/Hr) IV Continuous <Continuous>  lidocaine   4% Patch 1 Patch Transdermal daily    MEDICATIONS  (PRN):  morphine  - Injectable 3 milliGRAM(s) IV Push every 3 hours PRN breakthrough pain  oxyCODONE    IR 5 milliGRAM(s) Oral every 4 hours PRN Moderate Pain (4 - 6)  oxyCODONE    IR 10 milliGRAM(s) Oral every 6 hours PRN Severe Pain (7 - 10)      Weight (kg): 102.1 (04-10-24 @ 15:29)  BMI (kg/m2): 27.4 (04-10-24 @ 15:29)  ICU Vital Signs Last 24 Hrs  T(C): 36.6 (10 Apr 2024 22:00), Max: 37.2 (10 Apr 2024 20:44)  T(F): 97.8 (10 Apr 2024 22:00), Max: 98.9 (10 Apr 2024 20:44)  HR: 61 (11 Apr 2024 04:00) (58 - 80)  BP: 121/75 (11 Apr 2024 04:00) (103/64 - 151/88)  BP(mean): 89 (11 Apr 2024 04:00) (82 - 102)  ABP: --  ABP(mean): --  RR: 16 (11 Apr 2024 04:00) (13 - 24)  SpO2: 98% (11 Apr 2024 04:00) (88% - 100%)    O2 Parameters below as of 10 Apr 2024 19:30  Patient On (Oxygen Delivery Method): nasal cannula  O2 Flow (L/min): 2        I&O's Detail        LABS:                          12.1   8.26  )-----------( 164      ( 11 Apr 2024 02:55 )             35.0     04-11    138  |  107  |  15  ----------------------------<  132<H>  4.4   |  25  |  1.14    Ca    8.1<L>      11 Apr 2024 02:55  Phos  3.7     04-11  Mg     2.2     04-11    TPro  6.0  /  Alb  3.4  /  TBili  0.7  /  DBili  x   /  AST  24  /  ALT  42  /  AlkPhos  53  04-11          CAPILLARY BLOOD GLUCOSE        PT/INR - ( 10 Apr 2024 16:43 )   PT: 10.8 sec;   INR: 0.95 ratio         PTT - ( 10 Apr 2024 16:43 )  PTT:28.4 sec  Urinalysis Basic - ( 11 Apr 2024 02:55 )    Color: x / Appearance: x / SG: x / pH: x  Gluc: 132 mg/dL / Ketone: x  / Bili: x / Urobili: x   Blood: x / Protein: x / Nitrite: x   Leuk Esterase: x / RBC: x / WBC x   Sq Epi: x / Non Sq Epi: x / Bacteria: x      CULTURES:    Physical Examination:  General: No acute distress  HEENT: Pupils equal, reactive to light.  Symmetric  PULM: Clear to auscultation bilaterally, no significant sputum production  CVS: Regular rate and rhythm, no murmurs, rubs, or gallops  ABD: Soft, nondistended, nontender, normoactive bowel sounds, no masses  EXT: No edema, nontender  SKIN: Warm and well perfused  NEURO: A&Ox3, strength 5/5 all extremities, cranial nerves grossly intact, no focal deficits  POCUS:  RADIOLOGY:     RADIOLOGY/IMAGING/ECHO    Appropriate Imaging reviewed.    Health issues     HEALTH ISSUES - PROBLEM Dx:        Assessment/Plan:      Risks associated:  Therapies initiated:  Results interpreted:      Time spent on this patient encounter, which includes documenting this note in the electronic medical record, was 35-49 minutes including assessing the presenting problems with associated risks, reviewing the medical record to prepare for the encounter, and meeting face to face with the patient to obtain additional history.  I have also performed an appropriate physical exam, made interventions listed and ordered and interpreted appropriate diagnostic studies as documented.     To improve communication and patient safety, I have coordinated care with the multidisciplinary team including the bedside nurse, appropriate attending of record and consultants as needed.    Date of entry of this note is equal to the date of services rendered    Date of entry of this note is equal to the date of services rendered   Patient is a 64y old  Male who presents with a chief complaint of   ROLY LEVIN   64y    Male  All allergies reviewed No Known Allergies    BRIEF HOSPITAL COURSE:  Mr. Lvein is a relatively healthy 63 YO M who runs marathons, participates in triathlons, donated his kidney who presented to ED s/p fall. His dog's leash wrapped around his leg causing him to fall. He was found to have difficulty breathing. Found to have R PTX w/ R7-9 displaced rib fractures. S/p pigtail insertion. Patient states he has a surgical history of R knee replacement that was complicated by staph aureus bacteremia       Interval History:   - Complains of pain, but doing okay   - Encouraging incentive spirometry     PAST MEDICAL & SURGICAL HISTORY:    Review of Systems:                       All other ROS are negative    Medications:  MEDICATIONS  (STANDING):  acetaminophen     Tablet .. 975 milliGRAM(s) Oral every 8 hours  cephalexin 500 milliGRAM(s) Oral every 12 hours  dextrose 5% + sodium chloride 0.45%. 1000 milliLiter(s) (50 mL/Hr) IV Continuous <Continuous>  lidocaine   4% Patch 1 Patch Transdermal daily    MEDICATIONS  (PRN):  morphine  - Injectable 3 milliGRAM(s) IV Push every 3 hours PRN breakthrough pain  oxyCODONE    IR 5 milliGRAM(s) Oral every 4 hours PRN Moderate Pain (4 - 6)  oxyCODONE    IR 10 milliGRAM(s) Oral every 6 hours PRN Severe Pain (7 - 10)    Weight (kg): 102.1 (04-10-24 @ 15:29)  BMI (kg/m2): 27.4 (04-10-24 @ 15:29)  ICU Vital Signs Last 24 Hrs  T(C): 36.6 (10 Apr 2024 22:00), Max: 37.2 (10 Apr 2024 20:44)  T(F): 97.8 (10 Apr 2024 22:00), Max: 98.9 (10 Apr 2024 20:44)  HR: 61 (11 Apr 2024 04:00) (58 - 80)  BP: 121/75 (11 Apr 2024 04:00) (103/64 - 151/88)  BP(mean): 89 (11 Apr 2024 04:00) (82 - 102)  RR: 16 (11 Apr 2024 04:00) (13 - 24)  SpO2: 98% (11 Apr 2024 04:00) (88% - 100%)  O2 Parameters below as of 10 Apr 2024 19:30  Patient On (Oxygen Delivery Method): nasal cannula  O2 Flow (L/min): 2    I&O's Detail    LABS:                          12.1   8.26  )-----------( 164      ( 11 Apr 2024 02:55 )             35.0     04-11    138  |  107  |  15  ----------------------------<  132<H>  4.4   |  25  |  1.14    Ca    8.1<L>      11 Apr 2024 02:55  Phos  3.7     04-11  Mg     2.2     04-11    TPro  6.0  /  Alb  3.4  /  TBili  0.7  /  DBili  x   /  AST  24  /  ALT  42  /  AlkPhos  53  04-11    PT/INR - ( 10 Apr 2024 16:43 )   PT: 10.8 sec;   INR: 0.95 ratio    PTT - ( 10 Apr 2024 16:43 )  PTT:28.4 sec    Urinalysis Basic - ( 11 Apr 2024 02:55 )  Color: x / Appearance: x / SG: x / pH: x  Gluc: 132 mg/dL / Ketone: x  / Bili: x / Urobili: x   Blood: x / Protein: x / Nitrite: x   Leuk Esterase: x / RBC: x / WBC x   Sq Epi: x / Non Sq Epi: x / Bacteria: x    Physical Examination:  General: Some pain   HEENT: Pupils equal, reactive to light.  Symmetric  PULM: Clear to auscultation bilaterally, no significant sputum production  CVS: Regular rate and rhythm, no murmurs, rubs, or gallops  ABD: Soft, nondistended, nontender  EXT: No edema, nontender  SKIN: Warm and well perfused  NEURO: A&Ox3  RADIOLOGY:   < from: CT Abdomen and Pelvis w/ IV Cont (04.10.24 @ 17:41) >    ACC: [id number] EXAM:  CT ABDOMEN AND PELVIS IC   ORDERED BY: PIEDAD JOAQUIN     ACC: [id number] EXAM:  CT CHEST IC   ORDERED BY: PIEDAD JOAQUIN     PROCEDURE DATE:  04/10/2024      INTERPRETATION:  CLINICAL INFORMATION: Fall. Right-sided chest pain    COMPARISON: None.    CONTRAST/COMPLICATIONS:  IV Contrast: Omnipaque 350 (accession [id number]), IV contrast documented   in unlinked concurrent exam (accession [id number])  90 cc administered   0   cc discarded  Oral Contrast: NONE  Complications: None reported at time of study completion    PROCEDURE:  CT of the Chest, Abdomen and Pelvis was performed.  Imaging was performed through the chest in the arterial phase followed by   imaging of the abdomen and pelvis in the portal venous phase.  Sagittal and coronal reformats were performed.    FINDINGS:  CHEST:  LUNGS AND LARGE AIRWAYS: Patent central airways. Bibasilar atelectasis.  PLEURA: Small left effusion. Right-sided chest tube with persistent small   pneumohemothorax  VESSELS: Within normal limits.  HEART: Heart size is normal. No pericardial effusion.  MEDIASTINUM AND JORDANA: No lymphadenopathy.  CHEST WALL AND LOWER NECK: Extensive right chest wall subcutaneous   emphysema.    ABDOMEN AND PELVIS:  LIVER: Within normal limits.  BILE DUCTS: Normal caliber.  GALLBLADDER: Within normal limits.  SPLEEN: Within normal limits.  PANCREAS: Within normal limits.  ADRENALS: Within normal limits.  KIDNEYS/URETERS: Normal right kidney. Left kidney is absent.    BLADDER: Within normal limits.  REPRODUCTIVE ORGANS: Prostate is enlarged.    BOWEL: No bowel obstruction. Chronic diverticulosis without   diverticulitis. Normal appendix.  PERITONEUM: No ascites.  VESSELS: Atherosclerotic changes.  RETROPERITONEUM/LYMPH NODES: No lymphadenopathy.  ABDOMINAL WALL: Fat-containing umbilical hernia.  BONES: Degenerative changes. There are markedly displaced fractures of   the right posterior ninth through seventh ribs.    IMPRESSION:    There are markedly displaced fractures of the right posterior ninth   through seventh ribs.    Right chest tube in place. Small persistent right hemopneumothorax.    Extensive right chest wall subcutaneous emphysema.    --- End of Report ---    EVITA MCLAUGHLIN MD; Attending Radiologist  This document has been electronically signed. Apr 10 2024  6:04PM    < end of copied text >      Appropriate Imaging reviewed.

## 2024-04-11 NOTE — PROGRESS NOTE ADULT - ASSESSMENT
64y Male  PMHx of HTN presents to the ED c/o mid right back pain after falling on 4x4 plank today. Found to have right posterior rib fractures and a moderate PTX. S/p Right pigtail placement    plan:  Pigtail to suction   CXR this am  possible off suction after CXR  follow h/h   pain control:multimodal pain control   encourage IS and deep breathing   64y Male  PMHx of HTN presents to the ED c/o mid right back pain after falling on 4x4 plank. Found to have right posterior rib fractures and a moderate PTX. S/p Right pigtail placement    plan:  Pigtail to suction   CXR this am  possible off suction after CXR  follow h/h   pain control:multimodal pain control   encourage IS and deep breathing

## 2024-04-11 NOTE — PROGRESS NOTE ADULT - ASSESSMENT
ASSESSMENT  65yo Male with PMHx HTN, s/p nephrectomy for donation, hx R TKR c/b joint in presents to the ED c/o mid right back pain after falling on 4x4 plank. Found to have right posterior rib fractures and a moderate PTX. S/p Right pigtail placement by trauma team.     Admitted to trauma for:  1. Mechanical fall  2. Acute displaced R 7-9th rib fx  3. R PTX    PLAN    Neuro: AAOx 3. re-adjusted pain meds  CV:   Pulm: maintain O2 sat > 94%.  GI: PPI. bowel regimen prn  Nephro: monitor I & Os. Trend renal fxn  Endo: check a1c. BGMs ac hs. ISS.   Vasc:   MSK:   ID: trend WBC. monitor temps. f/u blood and urine cultures.   Heme: DVT ppx -   PT eval    Dispo:    Will discuss with   ASSESSMENT  65yo Male with PMHx HTN, s/p nephrectomy for donation, hx R TKR c/b joint in presents to the ED c/o mid right back pain after falling on 4x4 plank. Found to have right posterior rib fractures and a moderate PTX. S/p Right pigtail placement by trauma team.     Admitted to trauma for:  1. Mechanical fall  2. Acute displaced R 7-9th rib fx  3. R PTX    PLAN    Neuro: AAOx 3. re-adjusted pain meds. cont tylenol atc, ultram, dilaudid prn, lidocaine patch.  CV: Normotensive  Pulm: on 2L nc sating 96-98%, CXR this am, PTX improved. lung up, titrate to maintain O2 sat > 94%. CTS following, plan for rib plating. encourage incentive spirometer. R pigtail in place + airleak, cont -20 suction.  GI: PO diet. added bowel regimen prn  Nephro: monitor I & Os. Trend renal fxn  ID: pt was taking keflex at home for RUE skin abrasion from metal on his boat. received tetanus shot at Urgent care. prescribed 5 day course of keflex which started on Tuesday. will continue here.   Heme: Hgb trending down , but stabilized. start DVT ppx - hep sq.   PT eval    Dispo: SICU. pain control. R pigtail to suction. incentive spirometer    Will discuss with Dr. Ordonez ASSESSMENT  63yo Male with PMHx HTN, s/p nephrectomy for donation, hx R TKR c/b joint in presents to the ED c/o mid right back pain after falling on 4x4 plank. Found to have right posterior rib fractures and a moderate PTX. S/p Right pigtail placement by trauma team.     Admitted to trauma for:  1. Mechanical fall  2. Acute displaced R 7-9th rib fx  3. R PTX    PLAN    Neuro: AAOx 3. cont tylenol atc, ultram, dilaudid prn, lidocaine patch. consult anesthesia for PCA pump for pain mgmt.   CV: Normotensive. statin  Pulm: on 2L nc sating 96-98%, CXR this am, PTX improved. lung up, titrate to maintain O2 sat > 94%. CTS following, plan for rib plating tomorrow. encourage incentive spirometer. R pigtail in place + airleak, cont -20 suction.  GI: PO diet. added bowel regimen prn  Nephro: monitor I & Os. Trend renal fxn  ID: pt was taking keflex at home for RUE skin abrasion from metal on his boat. received tetanus shot at Urgent care. prescribed 5 day course of keflex which started on Tuesday. will continue here.   Heme: Hgb trending down , but stabilized. start DVT ppx - hep sq.   PT eval    EKG sinus rhythm. no ST changes. isolated TWI in lead III  METS > 4, pt runs multiple marathons and training for [employer]. denies chest pain.   revised cardiac risk index score: 0. 3.9 % 30-day risk of death, MI, or cardiac arres    Dispo: SICU. pain control. R pigtail to suction. incentive spirometer. rib plating tomorrow.  medically optimized for OR tomorrow     Will discuss with Dr. Ordonez, d/w CTS

## 2024-04-11 NOTE — PROGRESS NOTE ADULT - SUBJECTIVE AND OBJECTIVE BOX
Pt seen and examined, Pigtail to suction, no air leak, pain controlled, on multimodal pain control      Physical Exam  General: , NAD                                                         Neuro: A+O x 3, non-focal, speech clear and intact                     Eyes: PERRL, EOMI   ENT: Normal exam of nasal/oral mucosa with absence of cyanosis.   Neck: supple, no JVD, trachea midline   Chest:  equal bilaterally,  no accessory muscle use note, much tenderness right posterior chest,  Pigtail to suction, no air leak,   CV: RRR,   GI: soft, NT, ND,   Extremities: no edema  SKIN: warm, dry, intact       Vitals:  T(C): 36.9 ( @ 05:15), Max: 37.2 (04-10 @ 20:44)  HR: 61 ( 06:00) (58 - 80)  BP: 120/74 ( @ 06:00) (103/64 - 151/88)  RR: 20 ( 06:00) (13 - 24)  SpO2: 96% ( 06:00) (88% - 100%)      04-10 @ 07:01  -   @ 07:00  --------------------------------------------------------  IN:    dextrose 5% + sodium chloride 0.45%: 375 mL  Total IN: 375 mL    OUT:    Chest Tube (mL): 7 mL    Voided (mL): 1150 mL  Total OUT: 1157 mL    Total NET: -782 mL           @ 05:44                    11.6  CBC: 7.51>)-------(<159                     34.5                 - | - | -    CMP:  ----------------------< -               - | - | -                      Ca:-  Phos:-  Mg:-               -|      |-        LFTs:  ------|-|-----             -|      |-   @ 02:55                    12.1  CBC: 8.26>)-------(<164                     35.0                 138 | 107 | 15    CMP:  ----------------------< 132               4.4 | 25 | 1.14                      Ca:8.1  Phos:3.7  M.2               0.7|      |24        LFTs:  ------|53|-----             -|      |-  04-10 @ 23:02                    12.2  CBC: 8.54>)-------(<166                     36.0                 - | - | -    CMP:  ----------------------< -               - | - | -                      Ca:-  Phos:-  Mg:-               -|      |-        LFTs:  ------|-|-----             -|      |-  04-10 @ 18:47                    12.8  CBC: 11.68>)-------(<170                     37.7                 - | - | -    CMP:  ----------------------< -               - | - | -                      Ca:-  Phos:-  Mg:-               -|      |-        LFTs:  ------|-|-----             -|      |-  04-10 @ 16:43                    14.4  CBC: 11.27>)-------(<206                     41.7                 139 | 107 | 17    CMP:  ----------------------< 136               4.6 | 28 | 1.27                      Ca:9.6  Phos:-  Mg:-               0.4|      |35        LFTs:  ------|70|-----             -|      |-            Current Inpatient Medications:  acetaminophen     Tablet .. 975 milliGRAM(s) Oral every 8 hours  cephalexin 500 milliGRAM(s) Oral every 12 hours  dextrose 5% + sodium chloride 0.45%. 1000 milliLiter(s) (50 mL/Hr) IV Continuous <Continuous>  lidocaine   4% Patch 1 Patch Transdermal daily  morphine  - Injectable 3 milliGRAM(s) IV Push every 3 hours PRN  oxyCODONE    IR 5 milliGRAM(s) Oral every 4 hours PRN  oxyCODONE    IR 10 milliGRAM(s) Oral every 6 hours PRN       CC: Patient is a 64y old  Male who presents with a chief complaint of rib pain    Pt seen and examined, Pigtail to suction, no air leak, pain controlled, on multimodal pain control    ROS: as above otherwise negative    Physical Exam  General: , NAD                                                         Neuro: A+O x 3, non-focal, speech clear and intact                     Eyes: PERRL, EOMI   ENT: Normal exam of nasal/oral mucosa with absence of cyanosis.   Neck: supple, no JVD, trachea midline   Chest:  equal bilaterally,  no accessory muscle use note, much tenderness right posterior chest,  Pigtail to suction, no air leak,   CV: RRR,   GI: soft, NT, ND,   Extremities: no edema  SKIN: warm, dry, intact       Vitals:  T(C): 36.9 ( @ 05:15), Max: 37.2 (04-10 @ 20:44)  HR: 61 ( @ 06:00) (58 - 80)  BP: 120/74 ( @ 06:00) (103/64 - 151/88)  RR: 20 ( 06:00) (13 - 24)  SpO2: 96% ( @ 06:00) (88% - 100%)      04-10 @ 07:01  -   @ 07:00  --------------------------------------------------------  IN:    dextrose 5% + sodium chloride 0.45%: 375 mL  Total IN: 375 mL    OUT:    Chest Tube (mL): 7 mL    Voided (mL): 1150 mL  Total OUT: 1157 mL    Total NET: -782 mL           @ 05:44                    11.6  CBC: 7.51>)-------(<159                     34.5                 - | - | -    CMP:  ----------------------< -               - | - | -                      Ca:-  Phos:-  Mg:-               -|      |-        LFTs:  ------|-|-----             -|      |-   @ 02:55                    12.1  CBC: 8.26>)-------(<164                     35.0                 138 | 107 | 15    CMP:  ----------------------< 132               4.4 | 25 | 1.14                      Ca:8.1  Phos:3.7  M.2               0.7|      |24        LFTs:  ------|53|-----             -|      |-  04-10 @ 23:02                    12.2  CBC: 8.54>)-------(<166                     36.0                 - | - | -    CMP:  ----------------------< -               - | - | -                      Ca:-  Phos:-  Mg:-               -|      |-        LFTs:  ------|-|-----             -|      |-  04-10 @ 18:47                    12.8  CBC: 11.68>)-------(<170                     37.7                 - | - | -    CMP:  ----------------------< -               - | - | -                      Ca:-  Phos:-  Mg:-               -|      |-        LFTs:  ------|-|-----             -|      |-  04-10 @ 16:43                    14.4  CBC: 11.27>)-------(<206                     41.7                 139 | 107 | 17    CMP:  ----------------------< 136               4.6 | 28 | 1.27                      Ca:9.6  Phos:-  Mg:-               0.4|      |35        LFTs:  ------|70|-----             -|      |-            Current Inpatient Medications:  acetaminophen     Tablet .. 975 milliGRAM(s) Oral every 8 hours  cephalexin 500 milliGRAM(s) Oral every 12 hours  dextrose 5% + sodium chloride 0.45%. 1000 milliLiter(s) (50 mL/Hr) IV Continuous <Continuous>  lidocaine   4% Patch 1 Patch Transdermal daily  morphine  - Injectable 3 milliGRAM(s) IV Push every 3 hours PRN  oxyCODONE    IR 5 milliGRAM(s) Oral every 4 hours PRN  oxyCODONE    IR 10 milliGRAM(s) Oral every 6 hours PRN    Radiology:\

## 2024-04-11 NOTE — PROGRESS NOTE ADULT - ASSESSMENT
64y Male  PMHx of HTN presents to the ED c/o mid right back pain after falling on 4x4 plank today. Found to have right posterior rib fractures and a moderate PTX. S/p Right pigtail placement      Plan   case postponed until tomorrow   NPO p midnight   T&S completed   cont chest tube to suction  pain control  encourage IS and deep breathing  Plan for Flex Bronch  Rt VATS possible Wedge resection for +AL from  Rib puncture , Thoracotomy , Rib plating   Start NS at 75cc after midnight tonight   Cont Keflex for prophylaxis  DW Dr Perez

## 2024-04-11 NOTE — H&P ADULT - ASSESSMENT
64y Male  PMHx of HTN presents to the ED c/o mid right back pain after falling on 4x4 plank today. Found to have right posterior rib fractures and a moderate PTX. S/p Right pigtail placement    plan:  sicu admission   Pigtail to suction   CXR am  possible off suction after CXR  follow h/h   pain control:multimodal pain control   encourage IS and deep breathing  Thoracic service consulted

## 2024-04-11 NOTE — H&P ADULT - HISTORY OF PRESENT ILLNESS
HPI:  64y Male  PMHx of HTN, s/p nephrectomy for donation presents to the ED c/o mid right back pain after falling on 4x4 plank today. Found to have right posterior rib fractures and a moderate PTX. S/p Right pigtail placement by trauma team.   Pt seen w much pain w any movement.     PMH/PSH:  nephrectomy for kidney donation  knee replacement c/b infection, spacer   left should arthroscopy  epigastric hernia repair

## 2024-04-11 NOTE — PROGRESS NOTE ADULT - SUBJECTIVE AND OBJECTIVE BOX
Patient is a 64y old  Male who presents with a chief complaint of     BRIEF HOSPITAL COURSE: 63yo Male with PMHx HTN, s/p nephrectomy for donation presents to the ED c/o mid right back pain after falling on 4x4 plank. Found to have right posterior rib fractures and a moderate PTX. S/p Right pigtail placement by trauma team.       4/11 pt c/o R sided rib pain worsening with slight movement and rib pain. states breathing is better.     PAST MEDICAL & SURGICAL HISTORY:    Medications:  cephalexin 500 milliGRAM(s) Oral every 12 hours  acetaminophen     Tablet .. 975 milliGRAM(s) Oral every 8 hours  HYDROmorphone  Injectable 0.5 milliGRAM(s) IV Push every 4 hours PRN  HYDROmorphone  Injectable 1 milliGRAM(s) IV Push every 6 hours PRN  traMADol 50 milliGRAM(s) Oral every 6 hours PRN  polyethylene glycol 3350 17 Gram(s) Oral daily  senna 2 Tablet(s) Oral at bedtime  atorvastatin 20 milliGRAM(s) Oral at bedtime  lidocaine   4% Patch 1 Patch Transdermal daily      ICU Vital Signs Last 24 Hrs  T(C): 36.8 (11 Apr 2024 08:47), Max: 37.2 (10 Apr 2024 20:44)  T(F): 98.2 (11 Apr 2024 08:47), Max: 98.9 (10 Apr 2024 20:44)  HR: 65 (11 Apr 2024 10:00) (58 - 80)  BP: 133/84 (11 Apr 2024 10:00) (103/64 - 151/88)  BP(mean): 98 (11 Apr 2024 10:00) (82 - 102)  ABP: --  ABP(mean): --  RR: 16 (11 Apr 2024 10:00) (13 - 24)  SpO2: 96% (11 Apr 2024 10:00) (88% - 100%)    O2 Parameters below as of 11 Apr 2024 10:00  Patient On (Oxygen Delivery Method): nasal cannula  O2 Flow (L/min): 2      I&O's Detail    10 Apr 2024 07:01  -  11 Apr 2024 07:00  --------------------------------------------------------  IN:    dextrose 5% + sodium chloride 0.45%: 375 mL  Total IN: 375 mL    OUT:    Chest Tube (mL): 7 mL    Voided (mL): 1150 mL  Total OUT: 1157 mL    Total NET: -782 mL    LABS:                        11.7   6.87  )-----------( 147      ( 11 Apr 2024 11:05 )             34.7     04-11    138  |  107  |  15  ----------------------------<  132<H>  4.4   |  25  |  1.14    Ca    8.1<L>      11 Apr 2024 02:55  Phos  3.7     04-11  Mg     2.2     04-11    TPro  6.0  /  Alb  3.4  /  TBili  0.7  /  DBili  x   /  AST  24  /  ALT  42  /  AlkPhos  53  04-11      CAPILLARY BLOOD GLUCOSE      PT/INR - ( 10 Apr 2024 16:43 )   PT: 10.8 sec;   INR: 0.95 ratio      PTT - ( 10 Apr 2024 16:43 )  PTT:28.4 sec  Urinalysis Basic - ( 11 Apr 2024 02:55 )    Color: x / Appearance: x / SG: x / pH: x  Gluc: 132 mg/dL / Ketone: x  / Bili: x / Urobili: x   Blood: x / Protein: x / Nitrite: x   Leuk Esterase: x / RBC: x / WBC x   Sq Epi: x / Non Sq Epi: x / Bacteria: x    CULTURES:    Physical Examination:    General: No acute distress.    HEENT: Pupils equal, reactive to light.  Symmetric.  PULM: decreased at breath sounds b/l. no wheezing.   CVS: Regular rate and rhythm, no murmurs,  ABD: Soft, nondistended, nontende  EXT: No LE edema, nontender  SKIN: Warm and well perfused,   NEURO: Alert, oriented, interactive,     DEVICES:     RADIOLOGY:   < from: CT Chest w/ IV Cont (04.10.24 @ 17:41) >  IMPRESSION:    There are markedly displaced fractures of the right posterior ninth   through seventh ribs.    Right chest tube in place. Small persistent right hemopneumothorax.    Extensive right chest wall subcutaneous emphysema.    < end of copied text >   Patient is a 64y old  Male who presents with a chief complaint of     BRIEF HOSPITAL COURSE: 63yo Male with PMHx HTN, s/p nephrectomy for donation presents to the ED c/o mid right back pain after falling on 4x4 plank. Found to have right posterior rib fractures and a moderate PTX. S/p Right pigtail placement by trauma team.   4/11 pt c/o R sided rib pain worsening with slight movement and rib pain. states breathing is better.     PAST MEDICAL & SURGICAL HISTORY:    Medications:  cephalexin 500 milliGRAM(s) Oral every 12 hours  acetaminophen     Tablet .. 975 milliGRAM(s) Oral every 8 hours  HYDROmorphone  Injectable 0.5 milliGRAM(s) IV Push every 4 hours PRN  HYDROmorphone  Injectable 1 milliGRAM(s) IV Push every 6 hours PRN  traMADol 50 milliGRAM(s) Oral every 6 hours PRN  polyethylene glycol 3350 17 Gram(s) Oral daily  senna 2 Tablet(s) Oral at bedtime  atorvastatin 20 milliGRAM(s) Oral at bedtime  lidocaine   4% Patch 1 Patch Transdermal daily      ICU Vital Signs Last 24 Hrs  T(C): 36.8 (11 Apr 2024 08:47), Max: 37.2 (10 Apr 2024 20:44)  T(F): 98.2 (11 Apr 2024 08:47), Max: 98.9 (10 Apr 2024 20:44)  HR: 65 (11 Apr 2024 10:00) (58 - 80)  BP: 133/84 (11 Apr 2024 10:00) (103/64 - 151/88)  BP(mean): 98 (11 Apr 2024 10:00) (82 - 102)  ABP: --  ABP(mean): --  RR: 16 (11 Apr 2024 10:00) (13 - 24)  SpO2: 96% (11 Apr 2024 10:00) (88% - 100%)    O2 Parameters below as of 11 Apr 2024 10:00  Patient On (Oxygen Delivery Method): nasal cannula  O2 Flow (L/min): 2      I&O's Detail    10 Apr 2024 07:01  -  11 Apr 2024 07:00  --------------------------------------------------------  IN:    dextrose 5% + sodium chloride 0.45%: 375 mL  Total IN: 375 mL    OUT:    Chest Tube (mL): 7 mL    Voided (mL): 1150 mL  Total OUT: 1157 mL    Total NET: -782 mL    LABS:                        11.7   6.87  )-----------( 147      ( 11 Apr 2024 11:05 )             34.7     04-11    138  |  107  |  15  ----------------------------<  132<H>  4.4   |  25  |  1.14    Ca    8.1<L>      11 Apr 2024 02:55  Phos  3.7     04-11  Mg     2.2     04-11    TPro  6.0  /  Alb  3.4  /  TBili  0.7  /  DBili  x   /  AST  24  /  ALT  42  /  AlkPhos  53  04-11      CAPILLARY BLOOD GLUCOSE      PT/INR - ( 10 Apr 2024 16:43 )   PT: 10.8 sec;   INR: 0.95 ratio      PTT - ( 10 Apr 2024 16:43 )  PTT:28.4 sec  Urinalysis Basic - ( 11 Apr 2024 02:55 )    Color: x / Appearance: x / SG: x / pH: x  Gluc: 132 mg/dL / Ketone: x  / Bili: x / Urobili: x   Blood: x / Protein: x / Nitrite: x   Leuk Esterase: x / RBC: x / WBC x   Sq Epi: x / Non Sq Epi: x / Bacteria: x    CULTURES:    Physical Examination:    General: No acute distress.    HEENT: Pupils equal, reactive to light.  Symmetric.  PULM: decreased at breath sounds b/l. no wheezing.   CVS: Regular rate and rhythm, no murmurs,  ABD: Soft, nondistended, nontende  EXT: No LE edema, nontender  SKIN: Warm and well perfused,   NEURO: Alert, oriented, interactive,     DEVICES:     RADIOLOGY:   < from: CT Chest w/ IV Cont (04.10.24 @ 17:41) >  IMPRESSION:    There are markedly displaced fractures of the right posterior ninth   through seventh ribs.    Right chest tube in place. Small persistent right hemopneumothorax.    Extensive right chest wall subcutaneous emphysema.    < end of copied text >

## 2024-04-11 NOTE — PROGRESS NOTE ADULT - ASSESSMENT
Assessment/Plan:  Mr. Levin is a relatively healthy 65 YO M who runs marathons, participates in triathlons, donated his kidney who presented to ED s/p fall. His dog's leash wrapped around his leg causing him to fall. He was found to have difficulty breathing. Found to have R PTX w/ R7-9 displaced rib fractures. S/p pigtail insertion. Patient states he has a surgical history of R knee replacement that was complicated by staph aureus bacteremia       - Pain control, multimodal   - Incentive spirometry   - CT to suction   - AM labs ordered   - SCDs for DVT prophylaxis   - AM CXR  - Discussion of possible rib plating   - IVF    Time spent on this patient encounter, which includes documenting this note in the electronic medical record, was 35 minutes including assessing the presenting problems with associated risks, reviewing the medical record to prepare for the encounter, and meeting face to face with the patient to obtain additional history.  I have also performed an appropriate physical exam, made interventions listed and ordered and interpreted appropriate diagnostic studies as documented.     To improve communication and patient safety, I have coordinated care with the multidisciplinary team including the bedside nurse, appropriate attending of record and consultants as needed.    Date of entry of this note is equal to the date of services rendered

## 2024-04-12 ENCOUNTER — RESULT REVIEW (OUTPATIENT)
Age: 64
End: 2024-04-12

## 2024-04-12 ENCOUNTER — APPOINTMENT (OUTPATIENT)
Dept: THORACIC SURGERY | Facility: HOSPITAL | Age: 64
End: 2024-04-12

## 2024-04-12 LAB
ANION GAP SERPL CALC-SCNC: 2 MMOL/L — LOW (ref 5–17)
BUN SERPL-MCNC: 15 MG/DL — SIGNIFICANT CHANGE UP (ref 7–23)
CALCIUM SERPL-MCNC: 8.3 MG/DL — LOW (ref 8.5–10.1)
CHLORIDE SERPL-SCNC: 107 MMOL/L — SIGNIFICANT CHANGE UP (ref 96–108)
CO2 SERPL-SCNC: 26 MMOL/L — SIGNIFICANT CHANGE UP (ref 22–31)
CREAT SERPL-MCNC: 1.06 MG/DL — SIGNIFICANT CHANGE UP (ref 0.5–1.3)
EGFR: 78 ML/MIN/1.73M2 — SIGNIFICANT CHANGE UP
GLUCOSE SERPL-MCNC: 111 MG/DL — HIGH (ref 70–99)
HCT VFR BLD CALC: 33.5 % — LOW (ref 39–50)
HCT VFR BLD CALC: 36 % — LOW (ref 39–50)
HCV AB S/CO SERPL IA: 0.08 S/CO — SIGNIFICANT CHANGE UP (ref 0–0.99)
HCV AB SERPL-IMP: SIGNIFICANT CHANGE UP
HGB BLD-MCNC: 11.4 G/DL — LOW (ref 13–17)
HGB BLD-MCNC: 12.2 G/DL — LOW (ref 13–17)
MAGNESIUM SERPL-MCNC: 2.2 MG/DL — SIGNIFICANT CHANGE UP (ref 1.6–2.6)
MCHC RBC-ENTMCNC: 33.9 GM/DL — SIGNIFICANT CHANGE UP (ref 32–36)
MCHC RBC-ENTMCNC: 33.9 PG — SIGNIFICANT CHANGE UP (ref 27–34)
MCHC RBC-ENTMCNC: 33.9 PG — SIGNIFICANT CHANGE UP (ref 27–34)
MCHC RBC-ENTMCNC: 34 GM/DL — SIGNIFICANT CHANGE UP (ref 32–36)
MCV RBC AUTO: 100 FL — SIGNIFICANT CHANGE UP (ref 80–100)
MCV RBC AUTO: 99.7 FL — SIGNIFICANT CHANGE UP (ref 80–100)
PHOSPHATE SERPL-MCNC: 2.7 MG/DL — SIGNIFICANT CHANGE UP (ref 2.5–4.5)
PLATELET # BLD AUTO: 146 K/UL — LOW (ref 150–400)
PLATELET # BLD AUTO: 150 K/UL — SIGNIFICANT CHANGE UP (ref 150–400)
POTASSIUM SERPL-MCNC: 4.3 MMOL/L — SIGNIFICANT CHANGE UP (ref 3.5–5.3)
POTASSIUM SERPL-SCNC: 4.3 MMOL/L — SIGNIFICANT CHANGE UP (ref 3.5–5.3)
RBC # BLD: 3.36 M/UL — LOW (ref 4.2–5.8)
RBC # BLD: 3.6 M/UL — LOW (ref 4.2–5.8)
RBC # FLD: 12 % — SIGNIFICANT CHANGE UP (ref 10.3–14.5)
RBC # FLD: 12.3 % — SIGNIFICANT CHANGE UP (ref 10.3–14.5)
SODIUM SERPL-SCNC: 135 MMOL/L — SIGNIFICANT CHANGE UP (ref 135–145)
WBC # BLD: 7.17 K/UL — SIGNIFICANT CHANGE UP (ref 3.8–10.5)
WBC # BLD: 9.48 K/UL — SIGNIFICANT CHANGE UP (ref 3.8–10.5)
WBC # FLD AUTO: 7.17 K/UL — SIGNIFICANT CHANGE UP (ref 3.8–10.5)
WBC # FLD AUTO: 9.48 K/UL — SIGNIFICANT CHANGE UP (ref 3.8–10.5)

## 2024-04-12 PROCEDURE — 71045 X-RAY EXAM CHEST 1 VIEW: CPT | Mod: 26

## 2024-04-12 PROCEDURE — 99232 SBSQ HOSP IP/OBS MODERATE 35: CPT

## 2024-04-12 PROCEDURE — 32150 REMOVAL OF LUNG LESION(S): CPT | Mod: 80,RT

## 2024-04-12 PROCEDURE — 32150 REMOVAL OF LUNG LESION(S): CPT | Mod: RT

## 2024-04-12 PROCEDURE — 21811 OPTX OF RIB FX W/FIXJ SCOPE: CPT

## 2024-04-12 PROCEDURE — 88311 DECALCIFY TISSUE: CPT | Mod: 26

## 2024-04-12 PROCEDURE — 71045 X-RAY EXAM CHEST 1 VIEW: CPT | Mod: 26,77

## 2024-04-12 PROCEDURE — 88304 TISSUE EXAM BY PATHOLOGIST: CPT | Mod: 26

## 2024-04-12 PROCEDURE — 21811 OPTX OF RIB FX W/FIXJ SCOPE: CPT | Mod: 80

## 2024-04-12 PROCEDURE — ZZZZZ: CPT

## 2024-04-12 RX ORDER — SODIUM CHLORIDE 9 MG/ML
1000 INJECTION, SOLUTION INTRAVENOUS
Refills: 0 | Status: DISCONTINUED | OUTPATIENT
Start: 2024-04-12 | End: 2024-04-12

## 2024-04-12 RX ORDER — HEPARIN SODIUM 5000 [USP'U]/ML
5000 INJECTION INTRAVENOUS; SUBCUTANEOUS EVERY 8 HOURS
Refills: 0 | Status: DISCONTINUED | OUTPATIENT
Start: 2024-04-12 | End: 2024-04-17

## 2024-04-12 RX ORDER — CEFAZOLIN SODIUM 1 G
2000 VIAL (EA) INJECTION EVERY 8 HOURS
Refills: 0 | Status: DISCONTINUED | OUTPATIENT
Start: 2024-04-12 | End: 2024-04-12

## 2024-04-12 RX ORDER — SODIUM CHLORIDE 9 MG/ML
1000 INJECTION, SOLUTION INTRAVENOUS
Refills: 0 | Status: DISCONTINUED | OUTPATIENT
Start: 2024-04-12 | End: 2024-04-13

## 2024-04-12 RX ORDER — FENTANYL/BUPIVACAINE/NS/PF 2MCG/ML-.1
250 PLASTIC BAG, INJECTION (ML) INJECTION
Refills: 0 | Status: DISCONTINUED | OUTPATIENT
Start: 2024-04-12 | End: 2024-04-13

## 2024-04-12 RX ORDER — CEFAZOLIN SODIUM 1 G
2000 VIAL (EA) INJECTION EVERY 8 HOURS
Refills: 0 | Status: COMPLETED | OUTPATIENT
Start: 2024-04-12 | End: 2024-04-14

## 2024-04-12 RX ORDER — ACETAMINOPHEN 500 MG
1000 TABLET ORAL ONCE
Refills: 0 | Status: COMPLETED | OUTPATIENT
Start: 2024-04-12 | End: 2024-04-12

## 2024-04-12 RX ORDER — DEXAMETHASONE 0.5 MG/5ML
4 ELIXIR ORAL EVERY 6 HOURS
Refills: 0 | Status: DISCONTINUED | OUTPATIENT
Start: 2024-04-12 | End: 2024-04-17

## 2024-04-12 RX ORDER — ONDANSETRON 8 MG/1
4 TABLET, FILM COATED ORAL EVERY 6 HOURS
Refills: 0 | Status: COMPLETED | OUTPATIENT
Start: 2024-04-12 | End: 2024-04-13

## 2024-04-12 RX ORDER — HYDROMORPHONE HYDROCHLORIDE 2 MG/ML
30 INJECTION INTRAMUSCULAR; INTRAVENOUS; SUBCUTANEOUS
Refills: 0 | Status: DISCONTINUED | OUTPATIENT
Start: 2024-04-12 | End: 2024-04-12

## 2024-04-12 RX ORDER — ONDANSETRON 8 MG/1
4 TABLET, FILM COATED ORAL EVERY 6 HOURS
Refills: 0 | Status: DISCONTINUED | OUTPATIENT
Start: 2024-04-12 | End: 2024-04-12

## 2024-04-12 RX ORDER — NALOXONE HYDROCHLORIDE 4 MG/.1ML
0.1 SPRAY NASAL
Refills: 0 | Status: DISCONTINUED | OUTPATIENT
Start: 2024-04-12 | End: 2024-04-15

## 2024-04-12 RX ORDER — CHLORHEXIDINE GLUCONATE 213 G/1000ML
1 SOLUTION TOPICAL
Refills: 0 | Status: DISCONTINUED | OUTPATIENT
Start: 2024-04-12 | End: 2024-04-17

## 2024-04-12 RX ORDER — ONDANSETRON 8 MG/1
4 TABLET, FILM COATED ORAL ONCE
Refills: 0 | Status: DISCONTINUED | OUTPATIENT
Start: 2024-04-12 | End: 2024-04-12

## 2024-04-12 RX ORDER — NALOXONE HYDROCHLORIDE 4 MG/.1ML
0.1 SPRAY NASAL
Refills: 0 | Status: DISCONTINUED | OUTPATIENT
Start: 2024-04-12 | End: 2024-04-12

## 2024-04-12 RX ORDER — HYDROMORPHONE HYDROCHLORIDE 2 MG/ML
0.5 INJECTION INTRAMUSCULAR; INTRAVENOUS; SUBCUTANEOUS
Refills: 0 | Status: DISCONTINUED | OUTPATIENT
Start: 2024-04-12 | End: 2024-04-12

## 2024-04-12 RX ORDER — PROCHLORPERAZINE MALEATE 5 MG
5 TABLET ORAL EVERY 8 HOURS
Refills: 0 | Status: DISCONTINUED | OUTPATIENT
Start: 2024-04-12 | End: 2024-04-17

## 2024-04-12 RX ADMIN — Medication 1000 MILLIGRAM(S): at 04:15

## 2024-04-12 RX ADMIN — ATORVASTATIN CALCIUM 20 MILLIGRAM(S): 80 TABLET, FILM COATED ORAL at 22:58

## 2024-04-12 RX ADMIN — SODIUM CHLORIDE 75 MILLILITER(S): 9 INJECTION INTRAMUSCULAR; INTRAVENOUS; SUBCUTANEOUS at 00:14

## 2024-04-12 RX ADMIN — ONDANSETRON 4 MILLIGRAM(S): 8 TABLET, FILM COATED ORAL at 10:18

## 2024-04-12 RX ADMIN — SODIUM CHLORIDE 75 MILLILITER(S): 9 INJECTION INTRAMUSCULAR; INTRAVENOUS; SUBCUTANEOUS at 10:17

## 2024-04-12 RX ADMIN — HYDROMORPHONE HYDROCHLORIDE 30 MILLILITER(S): 2 INJECTION INTRAMUSCULAR; INTRAVENOUS; SUBCUTANEOUS at 04:16

## 2024-04-12 RX ADMIN — Medication 250 MILLILITER(S): at 19:19

## 2024-04-12 RX ADMIN — Medication 400 MILLIGRAM(S): at 04:01

## 2024-04-12 RX ADMIN — LIDOCAINE 1 PATCH: 4 CREAM TOPICAL at 10:17

## 2024-04-12 RX ADMIN — ONDANSETRON 4 MILLIGRAM(S): 8 TABLET, FILM COATED ORAL at 04:34

## 2024-04-12 RX ADMIN — HYDROMORPHONE HYDROCHLORIDE 1 MILLIGRAM(S): 2 INJECTION INTRAMUSCULAR; INTRAVENOUS; SUBCUTANEOUS at 00:50

## 2024-04-12 RX ADMIN — HYDROMORPHONE HYDROCHLORIDE 0.5 MILLIGRAM(S): 2 INJECTION INTRAMUSCULAR; INTRAVENOUS; SUBCUTANEOUS at 20:24

## 2024-04-12 RX ADMIN — HYDROMORPHONE HYDROCHLORIDE 0.5 MILLIGRAM(S): 2 INJECTION INTRAMUSCULAR; INTRAVENOUS; SUBCUTANEOUS at 20:45

## 2024-04-12 RX ADMIN — HEPARIN SODIUM 5000 UNIT(S): 5000 INJECTION INTRAVENOUS; SUBCUTANEOUS at 22:58

## 2024-04-12 RX ADMIN — Medication 1000 MILLIGRAM(S): at 20:43

## 2024-04-12 RX ADMIN — HYDROMORPHONE HYDROCHLORIDE 1 MILLIGRAM(S): 2 INJECTION INTRAMUSCULAR; INTRAVENOUS; SUBCUTANEOUS at 00:36

## 2024-04-12 RX ADMIN — Medication 400 MILLIGRAM(S): at 20:21

## 2024-04-12 NOTE — PROGRESS NOTE ADULT - ATTENDING COMMENTS
A/P:  Right pneumothorax  Right 7-9th rib fractures  S/P right pigtail chest tube   Pain control  Thoracic surgery on consult, plan per thoracic surgery for rib plating  ICU on consult for SICU mgmt  F/U labs, cxr .       35 minutes spent on total encounter. The necessity of the time spent during the encounter on this date of service was due to:   intervention, coordination of care with relevant specialties, assurance of hemodynamic stability.
A/P:  Right pneumothorax  Right 7-9th rib fractures  S/P right pigtail chest tube   Pain control  Thoracic surgery on consult, plan per thoracic surgery for rib plating  ICU on consult for SICU mgmt  F/U labs, cxr .       35 minutes spent on total encounter. The necessity of the time spent during the encounter on this date of service was due to:   intervention, coordination of care with relevant specialties, assurance of hemodynamic stability.

## 2024-04-12 NOTE — BRIEF OPERATIVE NOTE - NSICDXBRIEFPROCEDURE_GEN_ALL_CORE_FT
PROCEDURES:  Bronchoscopy, flexible 12-Apr-2024 18:41:59  Ed Nieves  Thoracotomy, with rib fixation using plate 12-Apr-2024 18:42:15  Ed Nieves  Thoracostomy for drainage of hemothorax 12-Apr-2024 18:42:54  Ed Nieves

## 2024-04-12 NOTE — PROGRESS NOTE ADULT - NS ATTEND AMEND GEN_ALL_CORE FT
Patient seen with PA [facility]    Patient with displaced rib fractures  s/p fall  no air leak on chest tube  on nasal cannula  comfortable on PCA    Rib fracture   Pulmonary contusion    1.  Rib plating today  2. PCA  3. incentive spirometry
Salvador seen with LISSY Lopez,  Patient fell and had multiple rib fractures displaced  c/o rib pain  had ptx pigtail placed no other injuries  on 2 liters nasal cannula    Plan  1. Pulmonary Contusion  2. Displaced Rib fractures  3. PTX    pigtail  analgesia, PCA consult  plan for rib plating next week  incentive spirometry

## 2024-04-12 NOTE — BRIEF OPERATIVE NOTE - NSICDXBRIEFPREOP_GEN_ALL_CORE_FT
PRE-OP DIAGNOSIS:  Hemothorax, right 12-Apr-2024 18:43:08  Ed Nieves  Rib fracture 12-Apr-2024 18:43:19  Ed Nieves  Pneumothorax, right 12-Apr-2024 18:43:28  Ed Nieves

## 2024-04-12 NOTE — PROGRESS NOTE ADULT - SUBJECTIVE AND OBJECTIVE BOX
Pt seen and examined, Pigtail to suction, no air leak, pain controlled, on multimodal pain control, planning fro possible rib plating  h/h stable         Tertiary survey    Physical Exam  General: , NAD                                                         Neuro: A+O x 3, non-focal, speech clear and intact                     Eyes: PERRL, EOMI   ENT: Normal exam of nasal/oral mucosa with absence of cyanosis.   Neck: supple, no JVD, trachea midline   Chest:  equal bilaterally,  no accessory muscle use note, much tenderness right posterior chest,  Pigtail to suction, no air leak,   CV: RRR,   GI: soft, NT, ND,   Extremities: no edema  SKIN: warm, dry, intact         CMP:  ----------------------< 136               4.6 | 28 | 1.27                      Ca:9.6  Phos:-  Mg:-               0.4|      |35        LFTs:  ------|70|-----             -|      |-                Vitals:  T(C): 36.5 ( @ 06:00), Max: 36.8 ( @ 08:47)  HR: 71 ( @ :00) (62 - 74)  BP: 132/85 ( @ 06:00) (116/86 - 142/94)  RR: 22 ( @ 06:00) (16 - 27)  SpO2: 96% ( @ 06:00) (93% - 98%)      0411 @ 07:01  -   @ 07:00  --------------------------------------------------------  IN:    IV PiggyBack: 100 mL    sodium chloride 0.9%: 450 mL  Total IN: 550 mL    OUT:    Chest Tube (mL): 13 mL    Voided (mL): 2150 mL  Total OUT: 2163 mL    Total NET: -1613 mL           @ 07:08                    12.2  CBC: 7.17>)-------(<150                     36.0                 - | - | -    CMP:  ----------------------< -               - | - | -                      Ca:-  Phos:-  Mg:-               -|      |-        LFTs:  ------|-|-----             -|      |-  -11 @ 19:22                    12.1  CBC: 7.52>)-------(<158                     35.0                 - | - | -    CMP:  ----------------------< -               - | - | -                      Ca:-  Phos:-  Mg:-               -|      |-        LFTs:  ------|-|-----             -|      |-  -11 @ 11:05                    11.7  CBC: 6.87>)-------(<147                     34.7                 137 | 106 | 14    CMP:  ----------------------< 127               3.7 | 26 | 1.17                      Ca:8.4  Phos:2.8  M.2               0.6|      |22        LFTs:  ------|53|-----             -|      |-  -11 @ 05:44                    11.6  CBC: 7.51>)-------(<159                     34.5                 - | - | -    CMP:  ----------------------< -               - | - | -                      Ca:-  Phos:-  Mg:-               -|      |-        LFTs:  ------|-|-----             -|      |-  04-11 @ 02:55                    12.1  CBC: 8.26>)-------(<164                     35.0                 138 | 107 | 15    CMP:  ----------------------< 132               4.4 | 25 | 1.14                      Ca:8.1  Phos:3.7  M.2               0.7|      |24        LFTs:  ------|53|-----             -|      |-  04-10 @ 23:02                    12.2  CBC: 8.54>)-------(<166                     36.0                 - | - | -    CMP:  ----------------------< -               - | - | -                      Ca:-  Phos:-  Mg:-               -|      |-        LFTs:  ------|-|-----             -|      |-            Current Inpatient Medications:  acetaminophen     Tablet .. 975 milliGRAM(s) Oral every 8 hours  acetaminophen   IVPB .. 1000 milliGRAM(s) IV Intermittent once  atorvastatin 20 milliGRAM(s) Oral at bedtime  cephalexin 500 milliGRAM(s) Oral every 12 hours  HYDROmorphone  Injectable 0.5 milliGRAM(s) IV Push every 4 hours PRN  HYDROmorphone  Injectable 1 milliGRAM(s) IV Push every 6 hours PRN  HYDROmorphone PCA (1 mG/mL) 30 milliLiter(s) PCA Continuous PCA Continuous  lidocaine   4% Patch 1 Patch Transdermal daily  naloxone Injectable 0.1 milliGRAM(s) IV Push every 3 minutes PRN  ondansetron Injectable 4 milliGRAM(s) IV Push every 6 hours PRN  polyethylene glycol 3350 17 Gram(s) Oral daily  senna 2 Tablet(s) Oral at bedtime  sodium chloride 0.9%. 1000 milliLiter(s) (75 mL/Hr) IV Continuous <Continuous>  traMADol 50 milliGRAM(s) Oral every 6 hours PRN           Patient is a 64y old  Male who presents with a chief complaint of     Pt seen and examined, Pigtail to suction, no air leak, pain controlled, on multimodal pain control, planning fro possible rib plating  h/h stable    ROS negative except as above      Tertiary survey    Physical Exam  General: , NAD                                                         Neuro: A+O x 3, non-focal, speech clear and intact                     Eyes: PERRL, EOMI   ENT: Normal exam of nasal/oral mucosa with absence of cyanosis.   Neck: supple, no JVD, trachea midline   Chest:  equal bilaterally,  no accessory muscle use note, much tenderness right posterior chest,  Pigtail to suction, no air leak,   CV: RRR,   GI: soft, NT, ND,   Extremities: no edema  SKIN: warm, dry, intact         CMP:  ----------------------< 136               4.6 | 28 | 1.27                      Ca:9.6  Phos:-  Mg:-               0.4|      |35        LFTs:  ------|70|-----             -|      |-                Vitals:  T(C): 36.5 ( @ 06:00), Max: 36.8 ( @ 08:47)  HR: 71 ( @ 06:00) (62 - 74)  BP: 132/85 ( @ 06:00) (116/86 - 142/94)  RR: 22 ( @ 06:00) (16 - 27)  SpO2: 96% ( @ 06:00) (93% - 98%)      04 @ 07:01  -   @ 07:00  --------------------------------------------------------  IN:    IV PiggyBack: 100 mL    sodium chloride 0.9%: 450 mL  Total IN: 550 mL    OUT:    Chest Tube (mL): 13 mL    Voided (mL): 2150 mL  Total OUT: 2163 mL    Total NET: -1613 mL           @ 07:08                    12.2  CBC: 7.17>)-------(<150                     36.0                 - | - | -    CMP:  ----------------------< -               - | - | -                      Ca:-  Phos:-  Mg:-               -|      |-        LFTs:  ------|-|-----             -|      | @ 19:22                    12.1  CBC: 7.52>)-------(<158                     35.0                 - | - | -    CMP:  ----------------------< -               - | - | -                      Ca:-  Phos:-  Mg:-               -|      |-        LFTs:  ------|-|-----             -|      | @ 11:05                    11.7  CBC: 6.87>)-------(<147                     34.7                 137 | 106 | 14    CMP:  ----------------------< 127               3.7 | 26 | 1.17                      Ca:8.4  Phos:2.8  M.2               0.6|      |22        LFTs:  ------|53|-----             -|      | @ 05:44                    11.6  CBC: 7.51>)-------(<159                     34.5                 - | - | -    CMP:  ----------------------< -               - | - | -                      Ca:-  Phos:-  Mg:-               -|      |-        LFTs:  ------|-|-----             -|      |-  11 @ 02:55                    12.1  CBC: 8.26>)-------(<164                     35.0                 138 | 107 | 15    CMP:  ----------------------< 132               4.4 | 25 | 1.14                      Ca:8.1  Phos:3.7  M.2               0.7|      |24        LFTs:  ------|53|-----             -|      |10 @ 23:02                    12.2  CBC: 8.54>)-------(<166                     36.0                 - | - | -    CMP:  ----------------------< -               - | - | -                      Ca:-  Phos:-  Mg:-               -|      |-        LFTs:  ------|-|-----             -|      |-            Current Inpatient Medications:  acetaminophen     Tablet .. 975 milliGRAM(s) Oral every 8 hours  acetaminophen   IVPB .. 1000 milliGRAM(s) IV Intermittent once  atorvastatin 20 milliGRAM(s) Oral at bedtime  cephalexin 500 milliGRAM(s) Oral every 12 hours  HYDROmorphone  Injectable 0.5 milliGRAM(s) IV Push every 4 hours PRN  HYDROmorphone  Injectable 1 milliGRAM(s) IV Push every 6 hours PRN  HYDROmorphone PCA (1 mG/mL) 30 milliLiter(s) PCA Continuous PCA Continuous  lidocaine   4% Patch 1 Patch Transdermal daily  naloxone Injectable 0.1 milliGRAM(s) IV Push every 3 minutes PRN  ondansetron Injectable 4 milliGRAM(s) IV Push every 6 hours PRN  polyethylene glycol 3350 17 Gram(s) Oral daily  senna 2 Tablet(s) Oral at bedtime  sodium chloride 0.9%. 1000 milliLiter(s) (75 mL/Hr) IV Continuous <Continuous>  traMADol 50 milliGRAM(s) Oral every 6 hours PRN        < from: CT Abdomen and Pelvis w/ IV Cont (04.10.24 @ 17:41) >    IMPRESSION:    There are markedly displaced fractures of the right posterior ninth   through seventh ribs.    Right chest tube in place. Small persistent right hemopneumothorax.    Extensive right chest wall subcutaneous emphysema.        --- End of Report ---            EVITA MCLAUGHLIN MD; Attending Radiologist  This document has been electronically signed. Apr 10 2024  6:04PM    < end of copied text >  < from: CT Chest w/ IV Cont (04.10.24 @ 17:41) >  IMPRESSION:    There are markedly displaced fractures of the right posterior ninth   through seventh ribs.    Right chest tube in place. Small persistent right hemopneumothorax.    Extensive right chest wall subcutaneous emphysema.        --- End of Report ---            EVITA MCLAUGHLIN MD; Attending Radiologist  This document has been electronically signed. Apr 10 2024  6:04PM    < end of copied text >  < from: Xray Chest 1 View-PORTABLE IMMEDIATE (Xray Chest 1 View-PORTABLE IMMEDIATE .) (04.10.24 @ 16:41) >  IMPRESSION:  No acute cardiopulmonary radiographic pathology.  Critical value  discussed with physician assistant  Jason, on 4/10/2024   at 4:54 PM with read back.  Hospital policies for critical values including read back  policy were followed.  The verbal communication of the critical value  supplements this written report.    --- End of Report ---            LIZ CHENG MD; Attending Radiologist  This document has been electronically signed. Apr 10 2024  4:58PM    < end of copied text >

## 2024-04-12 NOTE — PROGRESS NOTE ADULT - ASSESSMENT
ASSESSMENT  65yo Male with PMHx HTN, s/p nephrectomy for donation, hx R TKR c/b joint in presents to the ED c/o mid right back pain after falling on 4x4 plank. Found to have right posterior rib fractures and a moderate PTX. S/p Right pigtail placement by trauma team.     Admitted to trauma for:  1. Mechanical fall  2. Acute displaced R 7-9th rib fx  3. R PTX    PLAN    Neuro: AAOx 3. cont tylenol atc, dilaudid pca  CV: Normotensive. statin  Pulm: on 2L nc sating 96-98%, titrate to maintain O2 sat > 94%. CTS following, plan for rib plating today. encourage incentive spirometer. R pigtail in place + airleak, cont -20 suction.  GI:NPO for OR added bowel regimen prn  Nephro: monitor I & Os. Trend renal fxn  ID: pt was taking keflex at home for RUE skin abrasion from metal on his boat. received tetanus shot at Urgent care. prescribed 5 day course of keflex which started on Tuesday. will continue here.   Heme: Hgb trending down , but stabilized. start DVT ppx - hep sq.   PT eval pos top    Dispo: SICU. pain control. R pigtail to suction. incentive spirometer. rib plating today     Will discuss with Dr. Ordonez

## 2024-04-12 NOTE — BRIEF OPERATIVE NOTE - NSICDXBRIEFPOSTOP_GEN_ALL_CORE_FT
POST-OP DIAGNOSIS:  Hemothorax, right 12-Apr-2024 18:43:39  Ed Nieves  Pneumothorax, right 12-Apr-2024 18:43:58  Ed Nieves  Rib fracture 12-Apr-2024 18:44:10  Ed Nieves

## 2024-04-12 NOTE — PROVIDER CONTACT NOTE (OTHER) - SITUATION
Office is aware that patient is in [facility].  Please fax discharge papers to [phone].
[facility] and Dr. Valdez answered and I gave him all consult information

## 2024-04-12 NOTE — PROGRESS NOTE ADULT - SUBJECTIVE AND OBJECTIVE BOX
Patient is a 64y old  Male who presents with a chief complaint of     BRIEF HOSPITAL COURSE: 63yo Male with PMHx HTN, s/p nephrectomy for donation presents to the ED c/o mid right back pain after falling on 4x4 plank. Found to have right posterior rib fractures and a moderate PTX. S/p Right pigtail placement by trauma team.   4/11 pt c/o R sided rib pain worsening with slight movement and rib pain. states breathing is better.   4/12 pain better controlled today. dry mouth    PAST MEDICAL & SURGICAL HISTORY:    Medications:  cephalexin 500 milliGRAM(s) Oral every 12 hours  acetaminophen     Tablet .. 975 milliGRAM(s) Oral every 8 hours  HYDROmorphone  Injectable 0.5 milliGRAM(s) IV Push every 4 hours PRN  HYDROmorphone  Injectable 1 milliGRAM(s) IV Push every 6 hours PRN  traMADol 50 milliGRAM(s) Oral every 6 hours PRN  polyethylene glycol 3350 17 Gram(s) Oral daily  senna 2 Tablet(s) Oral at bedtime  atorvastatin 20 milliGRAM(s) Oral at bedtime  lidocaine   4% Patch 1 Patch Transdermal daily    Vital Signs Last 24 Hrs  T(C): 37 (12 Apr 2024 13:32), Max: 37 (12 Apr 2024 13:32)  T(F): 98.6 (12 Apr 2024 13:32), Max: 98.6 (12 Apr 2024 13:32)  HR: 74 (12 Apr 2024 13:00) (63 - 74)  BP: 146/83 (12 Apr 2024 13:00) (127/85 - 146/83)  BP(mean): 101 (12 Apr 2024 13:00) (91 - 119)  RR: 22 (12 Apr 2024 13:00) (14 - 27)  SpO2: 95% (12 Apr 2024 13:00) (91% - 98%)    Parameters below as of 12 Apr 2024 13:00  Patient On (Oxygen Delivery Method): nasal cannula  O2 Flow (L/min): 2    I&O's Detail    11 Apr 2024 07:01  -  12 Apr 2024 07:00  --------------------------------------------------------  IN:    IV PiggyBack: 100 mL    sodium chloride 0.9%: 450 mL  Total IN: 550 mL    OUT:    Chest Tube (mL): 13 mL    Voided (mL): 2150 mL  Total OUT: 2163 mL    Total NET: -1613 mL      12 Apr 2024 07:01  -  12 Apr 2024 13:41  --------------------------------------------------------  IN:  Total IN: 0 mL    OUT:    Voided (mL): 400 mL  Total OUT: 400 mL    Total NET: -400 mL        LABS:                          12.2   7.17  )-----------( 150      ( 12 Apr 2024 07:08 )             36.0     04-12    135  |  107  |  15  ----------------------------<  111<H>  4.3   |  26  |  1.06    Ca    8.3<L>      12 Apr 2024 07:08  Phos  2.7     04-12  Mg     2.2     04-12    TPro  5.9<L>  /  Alb  3.3  /  TBili  0.6  /  DBili  x   /  AST  22  /  ALT  37  /  AlkPhos  53  04-11  LIVER FUNCTIONS - ( 11 Apr 2024 11:05 )  Alb: 3.3 g/dL / Pro: 5.9 gm/dL / ALK PHOS: 53 U/L / ALT: 37 U/L / AST: 22 U/L / GGT: x           PT/INR - ( 10 Apr 2024 16:43 )   PT: 10.8 sec;   INR: 0.95 ratio    PTT - ( 10 Apr 2024 16:43 )  PTT:28.4 sec  Urinalysis Basic - ( 12 Apr 2024 07:08 )    Color: x / Appearance: x / SG: x / pH: x  Gluc: 111 mg/dL / Ketone: x  / Bili: x / Urobili: x   Blood: x / Protein: x / Nitrite: x   Leuk Esterase: x / RBC: x / WBC x   Sq Epi: x / Non Sq Epi: x / Bacteria: x      CULTURES:    Physical Examination:    General: No acute distress.    PULM: decreased at breath sounds b/l. no wheezing. R pigtail + airleak   CVS: Regular rate and rhythm, no murmurs,  ABD: Soft, nondistended, nontende  EXT: No LE edema, nontender  SKIN: Warm and well perfused,   NEURO: Alert, oriented, interactive,     DEVICES:   R pigtail    RADIOLOGY:   < from: CT Chest w/ IV Cont (04.10.24 @ 17:41) >  IMPRESSION:    There are markedly displaced fractures of the right posterior ninth   through seventh ribs.    Right chest tube in place. Small persistent right hemopneumothorax.    Extensive right chest wall subcutaneous emphysema.    < end of copied text >

## 2024-04-12 NOTE — PROGRESS NOTE ADULT - ASSESSMENT
64y Male  PMHx of HTN presents to the ED c/o mid right back pain after falling on 4x4 plank. Found to have right posterior rib fractures and a moderate PTX. S/p Right pigtail placement    No additional traumatic finding on tertiary exam    plan:  Pigtail to suction   follow up CXR this am  follow h/h   pain control: multimodal pain control   encourage IS and deep breathing  Thoracic surg recs> possible plating    64y Male  PMHx of HTN presents to the ED c/o mid right back pain after falling on 4x4 plank. Found to have right posterior rib fractures and a moderate PTX. S/p Right pigtail placement    No additional traumatic finding on tertiary exam    plan:  Pigtail to suction   follow up CXR this am  follow h/h   pain control: multimodal pain control   encourage IS and deep breathing  Thoracic surg recs> possible plating     Attending A/P:    Chart reviewed, patient examined, agree with above resident evaluation in addition to the following    h/h stable, no new injuries on tertiary, OR for rib plating with thoracic  will transfer to thoracic service after surgery  Pt aware of and agrees with all of the above    35 minuted of time spent on pt examination, review of relevant labs and radiologic studies, assured stabilization of pt, discussion with relevant services/providers for coordination of pt care and services

## 2024-04-13 LAB
ANION GAP SERPL CALC-SCNC: 6 MMOL/L — SIGNIFICANT CHANGE UP (ref 5–17)
BUN SERPL-MCNC: 14 MG/DL — SIGNIFICANT CHANGE UP (ref 7–23)
CALCIUM SERPL-MCNC: 8.3 MG/DL — LOW (ref 8.5–10.1)
CHLORIDE SERPL-SCNC: 106 MMOL/L — SIGNIFICANT CHANGE UP (ref 96–108)
CO2 SERPL-SCNC: 26 MMOL/L — SIGNIFICANT CHANGE UP (ref 22–31)
CREAT SERPL-MCNC: 0.95 MG/DL — SIGNIFICANT CHANGE UP (ref 0.5–1.3)
EGFR: 89 ML/MIN/1.73M2 — SIGNIFICANT CHANGE UP
GLUCOSE SERPL-MCNC: 138 MG/DL — HIGH (ref 70–99)
HCT VFR BLD CALC: 32 % — LOW (ref 39–50)
HGB BLD-MCNC: 11 G/DL — LOW (ref 13–17)
MAGNESIUM SERPL-MCNC: 2 MG/DL — SIGNIFICANT CHANGE UP (ref 1.6–2.6)
MCHC RBC-ENTMCNC: 33.8 PG — SIGNIFICANT CHANGE UP (ref 27–34)
MCHC RBC-ENTMCNC: 34.4 GM/DL — SIGNIFICANT CHANGE UP (ref 32–36)
MCV RBC AUTO: 98.5 FL — SIGNIFICANT CHANGE UP (ref 80–100)
PHOSPHATE SERPL-MCNC: 3 MG/DL — SIGNIFICANT CHANGE UP (ref 2.5–4.5)
PLATELET # BLD AUTO: 144 K/UL — LOW (ref 150–400)
POTASSIUM SERPL-MCNC: 4.1 MMOL/L — SIGNIFICANT CHANGE UP (ref 3.5–5.3)
POTASSIUM SERPL-SCNC: 4.1 MMOL/L — SIGNIFICANT CHANGE UP (ref 3.5–5.3)
RBC # BLD: 3.25 M/UL — LOW (ref 4.2–5.8)
RBC # FLD: 12.1 % — SIGNIFICANT CHANGE UP (ref 10.3–14.5)
SODIUM SERPL-SCNC: 138 MMOL/L — SIGNIFICANT CHANGE UP (ref 135–145)
WBC # BLD: 8.96 K/UL — SIGNIFICANT CHANGE UP (ref 3.8–10.5)
WBC # FLD AUTO: 8.96 K/UL — SIGNIFICANT CHANGE UP (ref 3.8–10.5)

## 2024-04-13 PROCEDURE — 71045 X-RAY EXAM CHEST 1 VIEW: CPT | Mod: 26

## 2024-04-13 PROCEDURE — 99232 SBSQ HOSP IP/OBS MODERATE 35: CPT

## 2024-04-13 RX ORDER — HYDROMORPHONE HYDROCHLORIDE 2 MG/ML
30 INJECTION INTRAMUSCULAR; INTRAVENOUS; SUBCUTANEOUS
Refills: 0 | Status: DISCONTINUED | OUTPATIENT
Start: 2024-04-13 | End: 2024-04-15

## 2024-04-13 RX ORDER — ACETAMINOPHEN 500 MG
1000 TABLET ORAL ONCE
Refills: 0 | Status: COMPLETED | OUTPATIENT
Start: 2024-04-13 | End: 2024-04-13

## 2024-04-13 RX ORDER — NALOXONE HYDROCHLORIDE 4 MG/.1ML
0.1 SPRAY NASAL
Refills: 0 | Status: DISCONTINUED | OUTPATIENT
Start: 2024-04-13 | End: 2024-04-15

## 2024-04-13 RX ORDER — HYDROMORPHONE HYDROCHLORIDE 2 MG/ML
0.5 INJECTION INTRAMUSCULAR; INTRAVENOUS; SUBCUTANEOUS ONCE
Refills: 0 | Status: DISCONTINUED | OUTPATIENT
Start: 2024-04-13 | End: 2024-04-13

## 2024-04-13 RX ORDER — ONDANSETRON 8 MG/1
4 TABLET, FILM COATED ORAL EVERY 6 HOURS
Refills: 0 | Status: DISCONTINUED | OUTPATIENT
Start: 2024-04-13 | End: 2024-04-17

## 2024-04-13 RX ADMIN — SENNA PLUS 2 TABLET(S): 8.6 TABLET ORAL at 21:13

## 2024-04-13 RX ADMIN — Medication 1000 MILLIGRAM(S): at 02:30

## 2024-04-13 RX ADMIN — Medication 400 MILLIGRAM(S): at 16:37

## 2024-04-13 RX ADMIN — ONDANSETRON 4 MILLIGRAM(S): 8 TABLET, FILM COATED ORAL at 02:28

## 2024-04-13 RX ADMIN — ATORVASTATIN CALCIUM 20 MILLIGRAM(S): 80 TABLET, FILM COATED ORAL at 21:13

## 2024-04-13 RX ADMIN — LIDOCAINE 1 PATCH: 4 CREAM TOPICAL at 09:57

## 2024-04-13 RX ADMIN — HYDROMORPHONE HYDROCHLORIDE 0.5 MILLIGRAM(S): 2 INJECTION INTRAMUSCULAR; INTRAVENOUS; SUBCUTANEOUS at 06:15

## 2024-04-13 RX ADMIN — HEPARIN SODIUM 5000 UNIT(S): 5000 INJECTION INTRAVENOUS; SUBCUTANEOUS at 05:05

## 2024-04-13 RX ADMIN — HYDROMORPHONE HYDROCHLORIDE 30 MILLILITER(S): 2 INJECTION INTRAMUSCULAR; INTRAVENOUS; SUBCUTANEOUS at 10:35

## 2024-04-13 RX ADMIN — Medication 400 MILLIGRAM(S): at 08:45

## 2024-04-13 RX ADMIN — HYDROMORPHONE HYDROCHLORIDE 0.5 MILLIGRAM(S): 2 INJECTION INTRAMUSCULAR; INTRAVENOUS; SUBCUTANEOUS at 02:45

## 2024-04-13 RX ADMIN — LIDOCAINE 1 PATCH: 4 CREAM TOPICAL at 19:50

## 2024-04-13 RX ADMIN — POLYETHYLENE GLYCOL 3350 17 GRAM(S): 17 POWDER, FOR SOLUTION ORAL at 09:58

## 2024-04-13 RX ADMIN — Medication 400 MILLIGRAM(S): at 02:16

## 2024-04-13 RX ADMIN — HEPARIN SODIUM 5000 UNIT(S): 5000 INJECTION INTRAVENOUS; SUBCUTANEOUS at 14:48

## 2024-04-13 RX ADMIN — Medication 2000 MILLIGRAM(S): at 08:45

## 2024-04-13 RX ADMIN — Medication 2000 MILLIGRAM(S): at 00:34

## 2024-04-13 RX ADMIN — Medication 2000 MILLIGRAM(S): at 21:12

## 2024-04-13 RX ADMIN — HYDROMORPHONE HYDROCHLORIDE 0.5 MILLIGRAM(S): 2 INJECTION INTRAMUSCULAR; INTRAVENOUS; SUBCUTANEOUS at 06:02

## 2024-04-13 RX ADMIN — Medication 1000 MILLIGRAM(S): at 17:10

## 2024-04-13 RX ADMIN — HYDROMORPHONE HYDROCHLORIDE 0.5 MILLIGRAM(S): 2 INJECTION INTRAMUSCULAR; INTRAVENOUS; SUBCUTANEOUS at 02:32

## 2024-04-13 RX ADMIN — SODIUM CHLORIDE 125 MILLILITER(S): 9 INJECTION, SOLUTION INTRAVENOUS at 00:51

## 2024-04-13 RX ADMIN — Medication 2000 MILLIGRAM(S): at 16:37

## 2024-04-13 RX ADMIN — HEPARIN SODIUM 5000 UNIT(S): 5000 INJECTION INTRAVENOUS; SUBCUTANEOUS at 21:12

## 2024-04-13 RX ADMIN — Medication 1000 MILLIGRAM(S): at 09:35

## 2024-04-13 RX ADMIN — Medication 975 MILLIGRAM(S): at 21:12

## 2024-04-13 RX ADMIN — LIDOCAINE 1 PATCH: 4 CREAM TOPICAL at 21:22

## 2024-04-13 NOTE — PROGRESS NOTE ADULT - SUBJECTIVE AND OBJECTIVE BOX
Patient is a 64y old  Male who presents with a chief complaint of     BRIEF HOSPITAL COURSE: 63 y/o male with pmhx of HTN presents to the ED c/o mid right back pain after falling on 4x4 plank. Found to have right posterior rib fractures and a moderate PTX. S/p Right pigtail placement in ED, now POD1 Thoracotomy, with rib fixation using plate    Events last 24 hours: Patient seen and examined at bedside. Remains on PCA for pain control     PAST MEDICAL & SURGICAL HISTORY:    Allergies    No Known Allergies    Intolerances      FAMILY HISTORY:      Review of Systems: As noted in HPI     Medications:  ceFAZolin  Injectable. 2000 milliGRAM(s) IV Push every 8 hours        acetaminophen     Tablet .. 975 milliGRAM(s) Oral every 8 hours  acetaminophen   IVPB .. 1000 milliGRAM(s) IV Intermittent once  acetaminophen   IVPB .. 1000 milliGRAM(s) IV Intermittent once  fentanyl (2 MICROgram(s)/mL) + bupivacaine 0.0625%  in 0.9% Sodium Chloride PCEA 250 milliLiter(s) Epidural PCA Continuous  HYDROmorphone  Injectable 0.5 milliGRAM(s) IV Push every 4 hours PRN  HYDROmorphone  Injectable 1 milliGRAM(s) IV Push every 6 hours PRN  ondansetron Injectable 4 milliGRAM(s) IV Push every 6 hours PRN  prochlorperazine   Injectable 5 milliGRAM(s) IntraMuscular every 8 hours PRN  traMADol 50 milliGRAM(s) Oral every 6 hours PRN      heparin   Injectable 5000 Unit(s) SubCutaneous every 8 hours    polyethylene glycol 3350 17 Gram(s) Oral daily  senna 2 Tablet(s) Oral at bedtime      atorvastatin 20 milliGRAM(s) Oral at bedtime  dexAMETHasone  Injectable 4 milliGRAM(s) IV Push every 6 hours PRN    lactated ringers. 1000 milliLiter(s) IV Continuous <Continuous>  sodium chloride 0.9%. 1000 milliLiter(s) IV Continuous <Continuous>      chlorhexidine 4% Liquid 1 Application(s) Topical <User Schedule>  lidocaine   4% Patch 1 Patch Transdermal daily    naloxone Injectable 0.1 milliGRAM(s) IV Push every 3 minutes PRN          ICU Vital Signs Last 24 Hrs  T(C): 37.1 (12 Apr 2024 22:00), Max: 37.3 (12 Apr 2024 19:30)  T(F): 98.7 (12 Apr 2024 22:00), Max: 99.1 (12 Apr 2024 19:30)  HR: 67 (13 Apr 2024 06:00) (58 - 79)  BP: 128/72 (13 Apr 2024 06:00) (120/64 - 147/76)  BP(mean): 88 (13 Apr 2024 06:00) (82 - 103)  ABP: --  ABP(mean): --  RR: 23 (13 Apr 2024 06:00) (14 - 40)  SpO2: 100% (13 Apr 2024 06:00) (91% - 100%)    O2 Parameters below as of 12 Apr 2024 22:00  Patient On (Oxygen Delivery Method): nasal cannula  O2 Flow (L/min): 2        Vital Signs Last 24 Hrs  T(C): 37.1 (12 Apr 2024 22:00), Max: 37.3 (12 Apr 2024 19:30)  T(F): 98.7 (12 Apr 2024 22:00), Max: 99.1 (12 Apr 2024 19:30)  HR: 67 (13 Apr 2024 06:00) (58 - 79)  BP: 128/72 (13 Apr 2024 06:00) (120/64 - 147/76)  BP(mean): 88 (13 Apr 2024 06:00) (82 - 103)  RR: 23 (13 Apr 2024 06:00) (14 - 40)  SpO2: 100% (13 Apr 2024 06:00) (91% - 100%)    Parameters below as of 12 Apr 2024 22:00  Patient On (Oxygen Delivery Method): nasal cannula  O2 Flow (L/min): 2          I&O's Detail    12 Apr 2024 07:01  -  13 Apr 2024 07:00  --------------------------------------------------------  IN:    IV PiggyBack: 200 mL    Lactated Ringers: 250 mL    Lactated Ringers: 950 mL    Other (mL): 1250 mL  Total IN: 2650 mL    OUT:    Chest Tube (mL): 525 mL    Indwelling Catheter - Urethral (mL): 1500 mL    Other (mL): 375 mL    Voided (mL): 400 mL  Total OUT: 2800 mL    Total NET: -150 mL            LABS:                        11.0   8.96  )-----------( 144      ( 13 Apr 2024 05:42 )             32.0     04-13    138  |  106  |  14  ----------------------------<  138<H>  4.1   |  26  |  0.95    Ca    8.3<L>      13 Apr 2024 05:42  Phos  2.7     04-12  Mg     2.2     04-12    TPro  5.9<L>  /  Alb  3.3  /  TBili  0.6  /  DBili  x   /  AST  22  /  ALT  37  /  AlkPhos  53  04-11          CAPILLARY BLOOD GLUCOSE          Urinalysis Basic - ( 13 Apr 2024 05:42 )    Color: x / Appearance: x / SG: x / pH: x  Gluc: 138 mg/dL / Ketone: x  / Bili: x / Urobili: x   Blood: x / Protein: x / Nitrite: x   Leuk Esterase: x / RBC: x / WBC x   Sq Epi: x / Non Sq Epi: x / Bacteria: x      CULTURES:      Physical Examination:    General: No acute distress.  Alert, oriented, interactive, nonfocal    HEENT: Pupils equal, reactive to light.  Symmetric.    PULM: Clear to auscultation bilaterally, no significant sputum production    CVS: Regular rate and rhythm, no murmurs, rubs, or gallops    ABD: Soft, nondistended, nontender, normoactive bowel sounds, no masses    EXT: No edema, nontender    SKIN: Warm and well perfused, no rashes noted.    RADIOLOGY: < from: Xray Chest 1 View- PORTABLE-Routine (Xray Chest 1 View- PORTABLE-Routine in AM.) (04.11.24 @ 07:43) >  IMPRESSION:    RIGHT pigtail chest tube catheter in place. No pneumothorax.  Residual lower zone airspace disease and/or pleural effusions.    < from: CT Abdomen and Pelvis w/ IV Cont (04.10.24 @ 17:41) >  IMPRESSION:    There are markedly displaced fractures of the right posterior ninth   through seventh ribs.    Right chest tube in place. Small persistent right hemopneumothorax.    Extensive right chest wall subcutaneous emphysema.           Patient is a 64y old  Male who presents with a chief complaint of     BRIEF HOSPITAL COURSE: 63 y/o male with pmhx of HTN presents to the ED c/o mid right back pain after falling on 4x4 plank. Found to have right posterior rib fractures and a moderate PTX. S/p Right pigtail placement in ED, now POD1 Thoracotomy, with rib fixation using plate    Events last 24 hours: Patient seen and examined at bedside. Remains on PCA for pain control     PAST MEDICAL & SURGICAL HISTORY:    Allergies    No Known Allergies    Intolerances      FAMILY HISTORY:      Review of Systems: As noted in HPI     Medications:  ceFAZolin  Injectable. 2000 milliGRAM(s) IV Push every 8 hours        acetaminophen     Tablet .. 975 milliGRAM(s) Oral every 8 hours  acetaminophen   IVPB .. 1000 milliGRAM(s) IV Intermittent once  acetaminophen   IVPB .. 1000 milliGRAM(s) IV Intermittent once  fentanyl (2 MICROgram(s)/mL) + bupivacaine 0.0625%  in 0.9% Sodium Chloride PCEA 250 milliLiter(s) Epidural PCA Continuous  HYDROmorphone  Injectable 0.5 milliGRAM(s) IV Push every 4 hours PRN  HYDROmorphone  Injectable 1 milliGRAM(s) IV Push every 6 hours PRN  ondansetron Injectable 4 milliGRAM(s) IV Push every 6 hours PRN  prochlorperazine   Injectable 5 milliGRAM(s) IntraMuscular every 8 hours PRN  traMADol 50 milliGRAM(s) Oral every 6 hours PRN      heparin   Injectable 5000 Unit(s) SubCutaneous every 8 hours    polyethylene glycol 3350 17 Gram(s) Oral daily  senna 2 Tablet(s) Oral at bedtime      atorvastatin 20 milliGRAM(s) Oral at bedtime  dexAMETHasone  Injectable 4 milliGRAM(s) IV Push every 6 hours PRN    lactated ringers. 1000 milliLiter(s) IV Continuous <Continuous>  sodium chloride 0.9%. 1000 milliLiter(s) IV Continuous <Continuous>      chlorhexidine 4% Liquid 1 Application(s) Topical <User Schedule>  lidocaine   4% Patch 1 Patch Transdermal daily    naloxone Injectable 0.1 milliGRAM(s) IV Push every 3 minutes PRN          ICU Vital Signs Last 24 Hrs  T(C): 37.1 (12 Apr 2024 22:00), Max: 37.3 (12 Apr 2024 19:30)  T(F): 98.7 (12 Apr 2024 22:00), Max: 99.1 (12 Apr 2024 19:30)  HR: 67 (13 Apr 2024 06:00) (58 - 79)  BP: 128/72 (13 Apr 2024 06:00) (120/64 - 147/76)  BP(mean): 88 (13 Apr 2024 06:00) (82 - 103)  ABP: --  ABP(mean): --  RR: 23 (13 Apr 2024 06:00) (14 - 40)  SpO2: 100% (13 Apr 2024 06:00) (91% - 100%)    O2 Parameters below as of 12 Apr 2024 22:00  Patient On (Oxygen Delivery Method): nasal cannula  O2 Flow (L/min): 2        Vital Signs Last 24 Hrs  T(C): 37.1 (12 Apr 2024 22:00), Max: 37.3 (12 Apr 2024 19:30)  T(F): 98.7 (12 Apr 2024 22:00), Max: 99.1 (12 Apr 2024 19:30)  HR: 67 (13 Apr 2024 06:00) (58 - 79)  BP: 128/72 (13 Apr 2024 06:00) (120/64 - 147/76)  BP(mean): 88 (13 Apr 2024 06:00) (82 - 103)  RR: 23 (13 Apr 2024 06:00) (14 - 40)  SpO2: 100% (13 Apr 2024 06:00) (91% - 100%)    Parameters below as of 12 Apr 2024 22:00  Patient On (Oxygen Delivery Method): nasal cannula  O2 Flow (L/min): 2          I&O's Detail    12 Apr 2024 07:01  -  13 Apr 2024 07:00  --------------------------------------------------------  IN:    IV PiggyBack: 200 mL    Lactated Ringers: 250 mL    Lactated Ringers: 950 mL    Other (mL): 1250 mL  Total IN: 2650 mL    OUT:    Chest Tube (mL): 525 mL    Indwelling Catheter - Urethral (mL): 1500 mL    Other (mL): 375 mL    Voided (mL): 400 mL  Total OUT: 2800 mL    Total NET: -150 mL            LABS:                        11.0   8.96  )-----------( 144      ( 13 Apr 2024 05:42 )             32.0     04-13    138  |  106  |  14  ----------------------------<  138<H>  4.1   |  26  |  0.95    Ca    8.3<L>      13 Apr 2024 05:42  Phos  2.7     04-12  Mg     2.2     04-12    TPro  5.9<L>  /  Alb  3.3  /  TBili  0.6  /  DBili  x   /  AST  22  /  ALT  37  /  AlkPhos  53  04-11          CAPILLARY BLOOD GLUCOSE          Urinalysis Basic - ( 13 Apr 2024 05:42 )    Color: x / Appearance: x / SG: x / pH: x  Gluc: 138 mg/dL / Ketone: x  / Bili: x / Urobili: x   Blood: x / Protein: x / Nitrite: x   Leuk Esterase: x / RBC: x / WBC x   Sq Epi: x / Non Sq Epi: x / Bacteria: x      CULTURES:      Physical Examination:    General: No acute distress.  Alert, oriented, interactive, nonfocal    HEENT: NC/AT     PULM: Clear to auscultation bilaterally. Chest tube on suction, no air leak present     CVS: RRR, normal S1/S2     ABD: Soft, nondistended, nontender    EXT: No edema, nontender    SKIN: Warm and well perfused, no rashes noted.    RADIOLOGY: < from: Xray Chest 1 View- PORTABLE-Routine (Xray Chest 1 View- PORTABLE-Routine in AM.) (04.11.24 @ 07:43) >  IMPRESSION:    RIGHT pigtail chest tube catheter in place. No pneumothorax.  Residual lower zone airspace disease and/or pleural effusions.    < from: CT Abdomen and Pelvis w/ IV Cont (04.10.24 @ 17:41) >  IMPRESSION:    There are markedly displaced fractures of the right posterior ninth   through seventh ribs.    Right chest tube in place. Small persistent right hemopneumothorax.    Extensive right chest wall subcutaneous emphysema.

## 2024-04-13 NOTE — PROGRESS NOTE ADULT - ASSESSMENT
65yo Male with PMHx HTN, s/p nephrectomy for donation, hx R TKR c/b joint in presents to the ED c/o mid right back pain after falling on 4x4 plank. Found to have right posterior rib fractures and a moderate PTX. S/p Right pigtail placement by trauma team.     Admitted to trauma for:  1. Mechanical fall  2. Acute displaced R 7-9th rib fx  3. R PTX    PLAN    Neuro: AAOx 3. epidural not working, changed to PCA  CV: Normotensive. statin  Pulm: on 2L nc sating 96-98%, titrate to maintain O2 sat > 94%. CTS following,s/p rib plating yesterday encourage incentive spirometer.  GI: diet as tolerated  Nephro: monitor I & Os. Trend renal fxn, d/c [place], d/c IV fluids  ID:  afebrile  Heme: Hgb stable  PT eval pos top

## 2024-04-13 NOTE — PROGRESS NOTE ADULT - SUBJECTIVE AND OBJECTIVE BOX
Pt seen and examined  POD1 Thoracotomy, with rib fixation using plate and chest tube placement w thoracic surgery  CT to suction w s/s output, no air leak, pain controlled, on multimodal pain control  Tertiary survey performed w no additional finding     Physical Exam  General: , NAD                                                         Neuro: A+O x 3, non-focal, speech clear and intact                     Eyes: PERRL, EOMI   ENT: Normal exam of nasal/oral mucosa with absence of cyanosis.   Neck: supple, no JVD, trachea midline   Chest:  equal bilaterally,  no accessory muscle use note, much tenderness right posterior chest,  CT to suction, no air leak, s/s output  CV: RRR,   GI: soft, NT, ND,   Extremities: no edema  SKIN: warm, dry, intact     Vital Signs Last 24 Hrs  T(C): 36.9 (12 Apr 2024 21:15), Max: 37.3 (12 Apr 2024 19:30)  T(F): 98.5 (12 Apr 2024 21:15), Max: 99.1 (12 Apr 2024 19:30)  HR: 58 (13 Apr 2024 02:00) (58 - 79)  BP: 135/75 (13 Apr 2024 02:00) (122/63 - 147/76)  BP(mean): 93 (13 Apr 2024 02:00) (83 - 103)  RR: 16 (13 Apr 2024 02:00) (14 - 40)  SpO2: 100% (13 Apr 2024 02:00) (91% - 100%)    Parameters below as of 12 Apr 2024 22:00  Patient On (Oxygen Delivery Method): nasal cannula  O2 Flow (L/min): 2                          11.4   9.48  )-----------( 146      ( 12 Apr 2024 22:06 )             33.5     04-12    135  |  107  |  15  ----------------------------<  111<H>  4.3   |  26  |  1.06    Ca    8.3<L>      12 Apr 2024 07:08  Phos  2.7     04-12  Mg     2.2     04-12    TPro  5.9<L>  /  Alb  3.3  /  TBili  0.6  /  DBili  x   /  AST  22  /  ALT  37  /  AlkPhos  53  04-11    I&O's Summary    11 Apr 2024 07:01  -  12 Apr 2024 07:00  --------------------------------------------------------  IN: 550 mL / OUT: 2163 mL / NET: -1613 mL    12 Apr 2024 07:01  -  13 Apr 2024 04:01  --------------------------------------------------------  IN: 1600 mL / OUT: 1365 mL / NET: 235 mL

## 2024-04-13 NOTE — PROGRESS NOTE ADULT - SUBJECTIVE AND OBJECTIVE BOX
Interval History:     Patient  with pain, states epidural not working     on nasal cannula o2     no fevers, chills     chest wall pain    HPI:      3yo Male with PMHx HTN, s/p nephrectomy for donation presents to the ED c/o mid right back pain after falling on 4x4 plank. Found to have right posterior rib fractures and a moderate PTX. S/p Right pigtail placement by trauma team.   4/11 pt c/o R sided rib pain worsening with slight movement and rib pain. states breathing is better.   4/13  s/p Rib plating 4/12, c/o incisional pain    PMH:         has only 1 kidney(donated)     MEDICATIONS  (STANDING):  acetaminophen     Tablet .. 975 milliGRAM(s) Oral every 8 hours  acetaminophen   IVPB .. 1000 milliGRAM(s) IV Intermittent once  atorvastatin 20 milliGRAM(s) Oral at bedtime  ceFAZolin  Injectable. 2000 milliGRAM(s) IV Push every 8 hours  chlorhexidine 4% Liquid 1 Application(s) Topical <User Schedule>  heparin   Injectable 5000 Unit(s) SubCutaneous every 8 hours  HYDROmorphone PCA (1 mG/mL) 30 milliLiter(s) PCA Continuous PCA Continuous  lidocaine   4% Patch 1 Patch Transdermal daily  polyethylene glycol 3350 17 Gram(s) Oral daily  senna 2 Tablet(s) Oral at bedtime    MEDICATIONS  (PRN):  dexAMETHasone  Injectable 4 milliGRAM(s) IV Push every 6 hours PRN Nausea  HYDROmorphone  Injectable 1 milliGRAM(s) IV Push every 6 hours PRN breakthrough pain  HYDROmorphone  Injectable 0.5 milliGRAM(s) IV Push every 4 hours PRN Severe Pain (7 - 10)  naloxone Injectable 0.1 milliGRAM(s) IV Push every 3 minutes PRN For ANY of the following changes in patient status:  A. RR LESS THAN 10 breaths per minute, B. Oxygen saturation LESS THAN 90%, C. Sedation score of 6  naloxone Injectable 0.1 milliGRAM(s) IV Push every 3 minutes PRN For ANY of the following changes in patient status:  A. RR LESS THAN 10 breaths per minute, B. Oxygen saturation LESS THAN 90%, C. Sedation score of 6  ondansetron Injectable 4 milliGRAM(s) IV Push every 6 hours PRN Nausea  ondansetron Injectable 4 milliGRAM(s) IV Push every 6 hours PRN Nausea  prochlorperazine   Injectable 5 milliGRAM(s) IntraMuscular every 8 hours PRN Nausea/or vomiting  traMADol 50 milliGRAM(s) Oral every 6 hours PRN Moderate Pain (4 - 6)    ICU Vital Signs Last 24 Hrs  T(C): 36.8 (13 Apr 2024 09:00), Max: 37.3 (12 Apr 2024 19:30)  T(F): 98.3 (13 Apr 2024 09:00), Max: 99.1 (12 Apr 2024 19:30)  HR: 69 (13 Apr 2024 08:00) (58 - 79)  BP: 145/87 (13 Apr 2024 08:00) (120/64 - 147/76)  BP(mean): 103 (13 Apr 2024 08:00) (82 - 103)  ABP: --  ABP(mean): --  RR: 27 (13 Apr 2024 08:00) (14 - 40)  SpO2: 98% (13 Apr 2024 08:00) (94% - 100%)    O2 Parameters below as of 13 Apr 2024 08:00  Patient On (Oxygen Delivery Method): nasal cannula  O2 Flow (L/min): 2    Physical Exam    General: uncomfortable  PULM:  clear, chest tube in place  CVS: Regular rate and rhythm, no murmurs,  ABD: Soft, nondistended, nontende  EXT: No LE edema, nontender  SKIN: Warm and well perfused,   NEURO: Alert, oriented, interactive,     I&O's Summary    12 Apr 2024 07:01  -  13 Apr 2024 07:00  --------------------------------------------------------  IN: 2650 mL / OUT: 2800 mL / NET: -150 mL                       11.0   8.96  )-----------( 144      ( 13 Apr 2024 05:42 )             32.0       04-13    138  |  106  |  14  ----------------------------<  138<H>  4.1   |  26  |  0.95    Ca    8.3<L>      13 Apr 2024 05:42  Phos  3.0     04-13  Mg     2.0     04-13    TPro  5.9<L>  /  Alb  3.3  /  TBili  0.6  /  DBili  x   /  AST  22  /  ALT  37  /  AlkPhos  53  04-11    Urinalysis Basic - ( 13 Apr 2024 05:42 )    Color: x / Appearance: x / SG: x / pH: x  Gluc: 138 mg/dL / Ketone: x  / Bili: x / Urobili: x   Blood: x / Protein: x / Nitrite: x   Leuk Esterase: x / RBC: x / WBC x   Sq Epi: x / Non Sq Epi: x / Bacteria: x      DVT Prophylaxis: Heparin subq                                                                 Advanced Directives: Full Code         Labs, Notes, Orders, radiologic studies reviewed and care coordinated with multidisciplinary team

## 2024-04-13 NOTE — PROGRESS NOTE ADULT - ASSESSMENT
64y Male  PMHx of HTN presents to the ED c/o mid right back pain after falling on 4x4 plank. Found to have right posterior rib fractures and a moderate PTX. S/p Right pigtail placement in ED, now POD1 Thoracotomy, with rib fixation using plate with CT surg    No additional traumatic finding on tertiary exam    plan:  chest tube management per CT surg  follow up CXR this am  follow h/h   pain control: multimodal pain control   encourage IS and deep breathing  transfer to CT surg team since no additional traumatic w/u indicated and pt cleared from trauma standpoint

## 2024-04-13 NOTE — PROGRESS NOTE ADULT - TIME BILLING
Patient seen d/w staff on interdisciplinary rounds
Patient seen discussed with staff on multidisciplinary rounds
Patient seen discussed, with staff on multidisciplinary rounds, on parental pain mds

## 2024-04-13 NOTE — PROGRESS NOTE ADULT - ASSESSMENT
63 y/o male with pmhx of HTN presents to the ED c/o mid right back pain after falling on 4x4 plank. Found to have right posterior rib fractures and a moderate PTX. S/p Right pigtail placement in ED, now POD1 Thoracotomy, with rib fixation using plate    -On PCA with Dilaudid pushes as needed for breakthrough pain   -Vitals stable. Adequate oxygenation on nasal cannula. Maintain spo2> 90%  -Keep right pigtail to suction this weekend. Encourage incentive spirometry.   -Ordered CXR for morning  -Diet as tolerated. Bowel regimen given use of opioids.   -SC heparin for dvt prophylaxis      TIME SPENT: 40 min    Time spent evaluating/treating patient, reviewing data/labs/imaging, discussing case with multidisciplinary team, discussing plan/goals of care with patient/family. Non-inclusive of procedure time. Date of entry of this note is equal to the date of services rendered.     Case Discussed with Dr. Duarte  65 y/o male with pmhx of HTN presents to the ED c/o mid right back pain after falling on 4x4 plank. Found to have right posterior rib fractures and a moderate PTX. S/p Right pigtail placement in ED, now POD1 Thoracotomy, with rib fixation using plate    -On PCA with Dilaudid pushes as needed for breakthrough pain   -Vitals stable. Adequate oxygenation on nasal cannula. Maintain spo2> 90%  -D/c suction, right pigtail now placed to waterseal. Will resume suction should he become hypoxia.   -Encourage incentive spirometry.   -Ordered CXR for morning  -Diet as tolerated. Bowel regimen given use of opioids.   -SC heparin for dvt prophylaxis      TIME SPENT: 40 min    Time spent evaluating/treating patient, reviewing data/labs/imaging, discussing case with multidisciplinary team, discussing plan/goals of care with patient/family. Non-inclusive of procedure time. Date of entry of this note is equal to the date of services rendered.     Case Discussed with Dr. Duarte  63 y/o male with pmhx of HTN presents to the ED c/o mid right back pain after falling on 4x4 plank. Found to have right posterior rib fractures and a moderate PTX. S/p Right pigtail placement in ED, now POD1 Thoracotomy, with rib fixation using plate    -On PCA with Dilaudid pushes as needed for breakthrough pain   -Vitals stable. Adequate oxygenation on nasal cannula. Maintain spo2> 90%  -D/c suction, right pigtail now placed to waterseal. Will resume suction should he become short of breath or hypoxic.   -Encourage incentive spirometry.   -Ordered CXR for morning  -Diet as tolerated. Bowel regimen given use of opioids.   -SC heparin for dvt prophylaxis      TIME SPENT: 40 min    Time spent evaluating/treating patient, reviewing data/labs/imaging, discussing case with multidisciplinary team, discussing plan/goals of care with patient/family. Non-inclusive of procedure time. Date of entry of this note is equal to the date of services rendered.     Case Discussed with Dr. Duarte

## 2024-04-13 NOTE — PROGRESS NOTE ADULT - SUBJECTIVE AND OBJECTIVE BOX
Pt seen examined. Pt states that epidural catheter never worked and been in pain all night.  Epidural cathter was d/c'ed and will replace with PCA.  Will follow.

## 2024-04-14 LAB
ANION GAP SERPL CALC-SCNC: 5 MMOL/L — SIGNIFICANT CHANGE UP (ref 5–17)
BUN SERPL-MCNC: 15 MG/DL — SIGNIFICANT CHANGE UP (ref 7–23)
CALCIUM SERPL-MCNC: 8.1 MG/DL — LOW (ref 8.5–10.1)
CHLORIDE SERPL-SCNC: 101 MMOL/L — SIGNIFICANT CHANGE UP (ref 96–108)
CO2 SERPL-SCNC: 28 MMOL/L — SIGNIFICANT CHANGE UP (ref 22–31)
CREAT SERPL-MCNC: 1.12 MG/DL — SIGNIFICANT CHANGE UP (ref 0.5–1.3)
EGFR: 73 ML/MIN/1.73M2 — SIGNIFICANT CHANGE UP
GLUCOSE SERPL-MCNC: 123 MG/DL — HIGH (ref 70–99)
HCT VFR BLD CALC: 31.4 % — LOW (ref 39–50)
HGB BLD-MCNC: 10.7 G/DL — LOW (ref 13–17)
MCHC RBC-ENTMCNC: 33.6 PG — SIGNIFICANT CHANGE UP (ref 27–34)
MCHC RBC-ENTMCNC: 34.1 GM/DL — SIGNIFICANT CHANGE UP (ref 32–36)
MCV RBC AUTO: 98.7 FL — SIGNIFICANT CHANGE UP (ref 80–100)
PLATELET # BLD AUTO: 156 K/UL — SIGNIFICANT CHANGE UP (ref 150–400)
POTASSIUM SERPL-MCNC: 4 MMOL/L — SIGNIFICANT CHANGE UP (ref 3.5–5.3)
POTASSIUM SERPL-SCNC: 4 MMOL/L — SIGNIFICANT CHANGE UP (ref 3.5–5.3)
RBC # BLD: 3.18 M/UL — LOW (ref 4.2–5.8)
RBC # FLD: 12.2 % — SIGNIFICANT CHANGE UP (ref 10.3–14.5)
SODIUM SERPL-SCNC: 134 MMOL/L — LOW (ref 135–145)
WBC # BLD: 7.22 K/UL — SIGNIFICANT CHANGE UP (ref 3.8–10.5)
WBC # FLD AUTO: 7.22 K/UL — SIGNIFICANT CHANGE UP (ref 3.8–10.5)

## 2024-04-14 PROCEDURE — 99232 SBSQ HOSP IP/OBS MODERATE 35: CPT

## 2024-04-14 PROCEDURE — 71045 X-RAY EXAM CHEST 1 VIEW: CPT | Mod: 26

## 2024-04-14 RX ORDER — ACETAMINOPHEN 500 MG
1000 TABLET ORAL ONCE
Refills: 0 | Status: COMPLETED | OUTPATIENT
Start: 2024-04-14 | End: 2024-04-14

## 2024-04-14 RX ADMIN — HEPARIN SODIUM 5000 UNIT(S): 5000 INJECTION INTRAVENOUS; SUBCUTANEOUS at 21:52

## 2024-04-14 RX ADMIN — ONDANSETRON 4 MILLIGRAM(S): 8 TABLET, FILM COATED ORAL at 01:40

## 2024-04-14 RX ADMIN — Medication 1000 MILLIGRAM(S): at 15:11

## 2024-04-14 RX ADMIN — HEPARIN SODIUM 5000 UNIT(S): 5000 INJECTION INTRAVENOUS; SUBCUTANEOUS at 14:27

## 2024-04-14 RX ADMIN — LIDOCAINE 1 PATCH: 4 CREAM TOPICAL at 23:37

## 2024-04-14 RX ADMIN — POLYETHYLENE GLYCOL 3350 17 GRAM(S): 17 POWDER, FOR SOLUTION ORAL at 10:38

## 2024-04-14 RX ADMIN — Medication 400 MILLIGRAM(S): at 14:27

## 2024-04-14 RX ADMIN — LIDOCAINE 1 PATCH: 4 CREAM TOPICAL at 10:38

## 2024-04-14 RX ADMIN — HEPARIN SODIUM 5000 UNIT(S): 5000 INJECTION INTRAVENOUS; SUBCUTANEOUS at 05:14

## 2024-04-14 RX ADMIN — Medication 2000 MILLIGRAM(S): at 05:14

## 2024-04-14 RX ADMIN — Medication 400 MILLIGRAM(S): at 21:52

## 2024-04-14 RX ADMIN — Medication 2000 MILLIGRAM(S): at 14:27

## 2024-04-14 RX ADMIN — ATORVASTATIN CALCIUM 20 MILLIGRAM(S): 80 TABLET, FILM COATED ORAL at 21:52

## 2024-04-14 RX ADMIN — Medication 1000 MILLIGRAM(S): at 22:22

## 2024-04-14 RX ADMIN — SENNA PLUS 2 TABLET(S): 8.6 TABLET ORAL at 21:52

## 2024-04-14 NOTE — PROGRESS NOTE ADULT - SUBJECTIVE AND OBJECTIVE BOX
CTS Progress Note    HPI:    S:    Pt seen and examined  63 y/o male with pmhx of HTN presents to the ED c/o mid right back pain after falling on 4x4 plank. Found to have right posterior rib fractures and a moderate PTX. S/p Right pigtail placement in ED, now POD1 Thoracotomy, with rib fixation using plate    4/14: Anesthesia d/c'd epidural yesterday, now on PCA; pain better controlled. CXR noted.     ROS:  +SOB, + post operative pain, improved  Remainder of systems reviewed, negative    Allergies    No Known Allergies    Intolerances        MEDICATIONS  (STANDING):  acetaminophen   IVPB .. 1000 milliGRAM(s) IV Intermittent once  atorvastatin 20 milliGRAM(s) Oral at bedtime  chlorhexidine 4% Liquid 1 Application(s) Topical <User Schedule>  heparin   Injectable 5000 Unit(s) SubCutaneous every 8 hours  HYDROmorphone PCA (1 mG/mL) 30 milliLiter(s) PCA Continuous PCA Continuous  lidocaine   4% Patch 1 Patch Transdermal daily  polyethylene glycol 3350 17 Gram(s) Oral daily  senna 2 Tablet(s) Oral at bedtime    MEDICATIONS  (PRN):  dexAMETHasone  Injectable 4 milliGRAM(s) IV Push every 6 hours PRN Nausea  HYDROmorphone  Injectable 0.5 milliGRAM(s) IV Push every 4 hours PRN Severe Pain (7 - 10)  HYDROmorphone  Injectable 1 milliGRAM(s) IV Push every 6 hours PRN breakthrough pain  naloxone Injectable 0.1 milliGRAM(s) IV Push every 3 minutes PRN For ANY of the following changes in patient status:  A. RR LESS THAN 10 breaths per minute, B. Oxygen saturation LESS THAN 90%, C. Sedation score of 6  naloxone Injectable 0.1 milliGRAM(s) IV Push every 3 minutes PRN For ANY of the following changes in patient status:  A. RR LESS THAN 10 breaths per minute, B. Oxygen saturation LESS THAN 90%, C. Sedation score of 6  ondansetron Injectable 4 milliGRAM(s) IV Push every 6 hours PRN Nausea  prochlorperazine   Injectable 5 milliGRAM(s) IntraMuscular every 8 hours PRN Nausea/or vomiting  traMADol 50 milliGRAM(s) Oral every 6 hours PRN Moderate Pain (4 - 6)      Drug Dosing Weight  Height (cm): 193 (10 Apr 2024 15:29)  Weight (kg): 102.1 (10 Apr 2024 15:29)  BMI (kg/m2): 27.4 (10 Apr 2024 15:29)  BSA (m2): 2.33 (10 Apr 2024 15:29)    PAST MEDICAL & SURGICAL HISTORY:      FAMILY HISTORY:      UNLESS OTHERWISE NOTED IN HPI above:    Constitutional:  No Weight Change, No Fever, No Chills, No Night Sweats, No Fatigue, No Malaise  ENT/Mouth:  No Hearing Changes, No Ear Pain, No Nasal Congestion, No  Sinus Pain, No Hoarseness, No sore throat, No Rhinorrhea, No Swallowing  Difficulty  Eyes:  No Eye Pain, No Swelling, No Redness, No Foreign Body, No Discharge, No Vision Changes  Cardiovascular:  No Chest Pain, No SOB, No PND, No Dyspnea on Exertion,  No Orthopnea, No Claudication, No Edema, No Palpitations  Respiratory:  No Cough, No Sputum, No Wheezing, No Smoke Exposure, No Dyspnea  Gastrointestinal:  No Nausea, No Vomiting, No Diarrhea, No  Constipation, No Pain, No Heartburn, No Anorexia, No Dysphagia, No  Hematochezia, No Melena, No Flatulence, No Jaundice  Genitourinary:  No Dysmenorrhea, No DUB, No Dyspareunia, No Dysuria, No  Urinary Frequency, No Hematuria, No Urinary Incontinence, No Urgency,  No Flank Pain, No Urinary Flow Changes, No Hesitancy  Musculoskeletal:  No Arthralgias, No Myalgias, No Joint Swelling, No  Joint Stiffness, No Back Pain, No Neck Pain, No Injury History  Skin:  No Skin Lesions, No Pruritis, No Hair Changes, No Breast/Skin Changes, No Nipple Discharge  Neuro:  No Weakness, No Numbness, No Paresthesias, No Loss of  Consciousness, No Syncope, No Dizziness, No Headache, No Coordination  Changes, No Recent Falls  Psych:  No Anxiety/Panic, No Depression, No Insomnia, No Personality  Changes, No Delusions, No Rumination, No SI/HI/AH/VH, No Social Issues,  No Memory Changes, No Violence/Abuse Hx., No Eating Concerns  Heme/Lymph:  No Bruising, No Bleeding, No Transfusions History, No Lymphadenopathy  Endocrine:  No Polyuria, No Polydipsia, No Temperature Intolerance    O:    ICU Vital Signs Last 24 Hrs  T(C): 36.7 (14 Apr 2024 14:35), Max: 38 (13 Apr 2024 20:15)  T(F): 98 (14 Apr 2024 14:35), Max: 100.4 (13 Apr 2024 20:15)  HR: 87 (14 Apr 2024 08:12) (77 - 96)  BP: 129/82 (14 Apr 2024 08:12) (117/67 - 143/85)  BP(mean): 97 (14 Apr 2024 08:12) (83 - 105)  ABP: --  ABP(mean): --  RR: 20 (14 Apr 2024 08:12) (18 - 28)  SpO2: 93% (14 Apr 2024 08:12) (93% - 96%)    O2 Parameters below as of 14 Apr 2024 08:12  Patient On (Oxygen Delivery Method): room air                I&O's Detail    13 Apr 2024 07:01  -  14 Apr 2024 07:00  --------------------------------------------------------  IN:  Total IN: 0 mL    OUT:    Chest Tube (mL): 70 mL    Indwelling Catheter - Urethral (mL): 750 mL    Voided (mL): 700 mL  Total OUT: 1520 mL    Total NET: -1520 mL      14 Apr 2024 07:01  -  14 Apr 2024 14:42  --------------------------------------------------------  IN:  Total IN: 0 mL    OUT:    Chest Tube (mL): 170 mL  Total OUT: 170 mL    Total NET: -170 mL              PE:    Adult lying in bed  No JVD trachea midline  Normocephalic, atraumatic  S1S2+  CTA B/L  + R sided CT noted, serosang fluid in pleurovac  Abd soft NTND  No leg swelling/edema noted  Awake and alert  Skin pink, warm    LABS:    CBC Full  -  ( 14 Apr 2024 05:24 )  WBC Count : 7.22 K/uL  RBC Count : 3.18 M/uL  Hemoglobin : 10.7 g/dL  Hematocrit : 31.4 %  Platelet Count - Automated : 156 K/uL  Mean Cell Volume : 98.7 fl  Mean Cell Hemoglobin : 33.6 pg  Mean Cell Hemoglobin Concentration : 34.1 gm/dL  Auto Neutrophil # : x  Auto Lymphocyte # : x  Auto Monocyte # : x  Auto Eosinophil # : x  Auto Basophil # : x  Auto Neutrophil % : x  Auto Lymphocyte % : x  Auto Monocyte % : x  Auto Eosinophil % : x  Auto Basophil % : x    04-14    134<L>  |  101  |  15  ----------------------------<  123<H>  4.0   |  28  |  1.12    Ca    8.1<L>      14 Apr 2024 05:24  Phos  3.0     04-13  Mg     2.0     04-13        Urinalysis Basic - ( 14 Apr 2024 05:24 )    Color: x / Appearance: x / SG: x / pH: x  Gluc: 123 mg/dL / Ketone: x  / Bili: x / Urobili: x   Blood: x / Protein: x / Nitrite: x   Leuk Esterase: x / RBC: x / WBC x   Sq Epi: x / Non Sq Epi: x / Bacteria: x      CAPILLARY BLOOD GLUCOSE

## 2024-04-14 NOTE — PROGRESS NOTE ADULT - SUBJECTIVE AND OBJECTIVE BOX
Interval History:     Patient post op day number 2 s/p rib plating s/p fall     on room air      incisional pain much better      on PCA      low grade fever overnight      chest tube drained 170 last day    HPI:     63yo Male with PMHx HTN, s/p nephrectomy for donation presents to the ED c/o mid right back pain after falling on 4x4 plank. Found to have right posterior rib fractures and a moderate PTX. S/p Right pigtail placement by trauma team.   4/11 pt c/o R sided rib pain worsening with slight movement and rib pain. states breathing is better.   4/13  s/p Rib plating 4/12, c/o incisional pain    PMH:         has only 1 kidney(donated)     MEDICATIONS  (STANDING):  acetaminophen   IVPB .. 1000 milliGRAM(s) IV Intermittent once  atorvastatin 20 milliGRAM(s) Oral at bedtime  ceFAZolin  Injectable. 2000 milliGRAM(s) IV Push every 8 hours  chlorhexidine 4% Liquid 1 Application(s) Topical <User Schedule>  heparin   Injectable 5000 Unit(s) SubCutaneous every 8 hours  HYDROmorphone PCA (1 mG/mL) 30 milliLiter(s) PCA Continuous PCA Continuous  lidocaine   4% Patch 1 Patch Transdermal daily  polyethylene glycol 3350 17 Gram(s) Oral daily  senna 2 Tablet(s) Oral at bedtime    MEDICATIONS  (PRN):  dexAMETHasone  Injectable 4 milliGRAM(s) IV Push every 6 hours PRN Nausea  HYDROmorphone  Injectable 0.5 milliGRAM(s) IV Push every 4 hours PRN Severe Pain (7 - 10)  HYDROmorphone  Injectable 1 milliGRAM(s) IV Push every 6 hours PRN breakthrough pain  naloxone Injectable 0.1 milliGRAM(s) IV Push every 3 minutes PRN For ANY of the following changes in patient status:  A. RR LESS THAN 10 breaths per minute, B. Oxygen saturation LESS THAN 90%, C. Sedation score of 6  naloxone Injectable 0.1 milliGRAM(s) IV Push every 3 minutes PRN For ANY of the following changes in patient status:  A. RR LESS THAN 10 breaths per minute, B. Oxygen saturation LESS THAN 90%, C. Sedation score of 6  ondansetron Injectable 4 milliGRAM(s) IV Push every 6 hours PRN Nausea  prochlorperazine   Injectable 5 milliGRAM(s) IntraMuscular every 8 hours PRN Nausea/or vomiting  traMADol 50 milliGRAM(s) Oral every 6 hours PRN Moderate Pain (4 - 6)    ICU Vital Signs Last 24 Hrs  T(C): 36.9 (14 Apr 2024 09:27), Max: 38 (13 Apr 2024 20:15)  T(F): 98.5 (14 Apr 2024 09:27), Max: 100.4 (13 Apr 2024 20:15)  HR: 87 (14 Apr 2024 08:12) (77 - 96)  BP: 129/82 (14 Apr 2024 08:12) (117/67 - 143/85)  BP(mean): 97 (14 Apr 2024 08:12) (83 - 105)  ABP: --  ABP(mean): --  RR: 20 (14 Apr 2024 08:12) (18 - 28)  SpO2: 93% (14 Apr 2024 08:12) (93% - 96%)    O2 Parameters below as of 14 Apr 2024 08:12  Patient On (Oxygen Delivery Method): room air    Physical Exam    General - no distress  HEENT nc/at  neck supple  lungs clear, chest tube in place,   cv rrr  abdomen soft  extrmeiteis warm  neuro awake alert appropraite          I&O's Summary    13 Apr 2024 07:01  -  14 Apr 2024 07:00  --------------------------------------------------------  IN: 0 mL / OUT: 1520 mL / NET: -1520 mL    14 Apr 2024 07:01  -  14 Apr 2024 13:16  --------------------------------------------------------  IN: 0 mL / OUT: 170 mL / NET: -170 mL                            10.7   7.22  )-----------( 156      ( 14 Apr 2024 05:24 )             31.4       04-14    134<L>  |  101  |  15  ----------------------------<  123<H>  4.0   |  28  |  1.12    Ca    8.1<L>      14 Apr 2024 05:24  Phos  3.0     04-13  Mg     2.0     04-13      Urinalysis Basic - ( 14 Apr 2024 05:24 )    Color: x / Appearance: x / SG: x / pH: x  Gluc: 123 mg/dL / Ketone: x  / Bili: x / Urobili: x   Blood: x / Protein: x / Nitrite: x   Leuk Esterase: x / RBC: x / WBC x   Sq Epi: x / Non Sq Epi: x / Bacteria: x        I personally reviewed the CXR: good expansion no pneumothorax    DVT Prophylaxis:   Heparin subq                                                              Advanced Directives: Full Code         Labs, Notes, Orders, radiologic studies reviewed and care coordinated with multidisciplinary team

## 2024-04-14 NOTE — PROGRESS NOTE ADULT - ASSESSMENT
63yo Male with PMHx HTN, s/p nephrectomy for donation, hx R TKR c/b joint in presents to the ED c/o mid right back pain after falling on 4x4 plank. Found to have right posterior rib fractures and a moderate PTX. S/p Right pigtail placement by trauma team.     Admitted to trauma for:  1. Mechanical fall  2. Acute displaced R 7-9th rib fx  3. R PTX    PLAN    Neuro: AAOx 3. epidural not working, changed to PCA, more comfortabl today  CV: Normotensive. statin  Pulm: on 2L nc sating 96-98%, titrate to maintain O2 sat > 94%. CTS following,s/p rib plating yesterday encourage incentive spirometer.  GI: diet as tolerated  Nephro: monitor I & Os. Trend renal fxn, foey out  ID:  afebrile  Heme: Hgb stable heparin subq dvt prophylaxis  PT eval pos top

## 2024-04-14 NOTE — PROGRESS NOTE ADULT - ASSESSMENT
A:    65 y/o male with pmhx of HTN presents to the ED c/o mid right back pain after falling on 4x4 plank. Found to have right posterior rib fractures and a moderate PTX. S/p Right pigtail placement in ED, now POD1 Thoracotomy, with rib fixation using plate    This patient requires a moderate level of care due to the complexity and severity of their condition which increases the amount and complexity of data to be reviewed and analyzed in addition to the risk of complications, morbidity and mortality of patient management    P:    Analgesia with PCA + adjunctive agents  HD monitoring, IS, ENCOURAGE oob, ambulate as much as possible  CT remains on WS  CXR clear, lung up  Cont diet, bowel regimen  VTE ppx    Dispo: Cont care.    Time spent on this patient encounter: 35+ minutes    Date of entry of this note is equal to the date of services rendered.    This includes assessment of the presenting problems with associated risks, reviewing the medical record to prepare for the encounter and meeting face to face with the patient to obtain additional history and conduct a focused exmaination. I have also performed an appropiate physical exam, made interventions listed and ordered and interpreted appropiate diagnostic studies as documented. This also included communication and coordination of care with the multidisciplinary team including the bedside nurse, appropriate attending of record and consultants as needed.

## 2024-04-14 NOTE — PROGRESS NOTE ADULT - SUBJECTIVE AND OBJECTIVE BOX
Pt seen and examined  POD2 Thoracotomy, with rib fixation and chest tube placement w thoracic surgery  CT to suction w s/s output, no air leak, pain controlled, on multimodal pain control  Tertiary survey performed w no additional finding   denies fevers, chills, chest pain, sob, n/v/d/c    Physical Exam  General: , NAD                                                         Neuro: A+O x 3, non-focal, speech clear and intact                     Eyes: PERRL, EOMI   ENT: Normal exam of nasal/oral mucosa with absence of cyanosis.   Neck: supple, no JVD, trachea midline   Chest:  equal bilaterally,  no accessory muscle use, much tenderness right posterior chest,  CT to suction, no air leak, s/s output  CV: RRR,   GI: soft, NT, ND,   Extremities: no edema  SKIN: warm, dry, intact     Vital Signs Last 24 Hrs  T(C): 37 (14 Apr 2024 05:00), Max: 38 (13 Apr 2024 20:15)  T(F): 98.6 (14 Apr 2024 05:00), Max: 100.4 (13 Apr 2024 20:15)  HR: 82 (14 Apr 2024 06:00) (69 - 96)  BP: 126/79 (14 Apr 2024 06:00) (117/67 - 145/87)  BP(mean): 93 (14 Apr 2024 06:00) (83 - 105)  RR: 20 (14 Apr 2024 06:00) (18 - 33)  SpO2: 95% (14 Apr 2024 06:00) (93% - 99%)    Parameters below as of 13 Apr 2024 20:00  Patient On (Oxygen Delivery Method): room air      MEDICATIONS  (STANDING):  acetaminophen   IVPB .. 1000 milliGRAM(s) IV Intermittent once  atorvastatin 20 milliGRAM(s) Oral at bedtime  ceFAZolin  Injectable. 2000 milliGRAM(s) IV Push every 8 hours  chlorhexidine 4% Liquid 1 Application(s) Topical <User Schedule>  heparin   Injectable 5000 Unit(s) SubCutaneous every 8 hours  HYDROmorphone PCA (1 mG/mL) 30 milliLiter(s) PCA Continuous PCA Continuous  lidocaine   4% Patch 1 Patch Transdermal daily  polyethylene glycol 3350 17 Gram(s) Oral daily  senna 2 Tablet(s) Oral at bedtime    MEDICATIONS  (PRN):  dexAMETHasone  Injectable 4 milliGRAM(s) IV Push every 6 hours PRN Nausea  HYDROmorphone  Injectable 1 milliGRAM(s) IV Push every 6 hours PRN breakthrough pain  HYDROmorphone  Injectable 0.5 milliGRAM(s) IV Push every 4 hours PRN Severe Pain (7 - 10)  naloxone Injectable 0.1 milliGRAM(s) IV Push every 3 minutes PRN For ANY of the following changes in patient status:  A. RR LESS THAN 10 breaths per minute, B. Oxygen saturation LESS THAN 90%, C. Sedation score of 6  naloxone Injectable 0.1 milliGRAM(s) IV Push every 3 minutes PRN For ANY of the following changes in patient status:  A. RR LESS THAN 10 breaths per minute, B. Oxygen saturation LESS THAN 90%, C. Sedation score of 6  ondansetron Injectable 4 milliGRAM(s) IV Push every 6 hours PRN Nausea  prochlorperazine   Injectable 5 milliGRAM(s) IntraMuscular every 8 hours PRN Nausea/or vomiting  traMADol 50 milliGRAM(s) Oral every 6 hours PRN Moderate Pain (4 - 6)                          10.7   7.22  )-----------( 156      ( 14 Apr 2024 05:24 )             31.4     04-14    134<L>  |  101  |  15  ----------------------------<  123<H>  4.0   |  28  |  1.12    Ca    8.1<L>      14 Apr 2024 05:24  Phos  3.0     04-13  Mg     2.0     04-13      I&O's Summary    13 Apr 2024 07:01  -  14 Apr 2024 07:00  --------------------------------------------------------  IN: 0 mL / OUT: 1520 mL / NET: -1520 mL    14 Apr 2024 07:01  -  14 Apr 2024 07:32  --------------------------------------------------------  IN: 0 mL / OUT: 170 mL / NET: -170 mL

## 2024-04-15 LAB
ANION GAP SERPL CALC-SCNC: 5 MMOL/L — SIGNIFICANT CHANGE UP (ref 5–17)
BASOPHILS # BLD AUTO: 0.03 K/UL — SIGNIFICANT CHANGE UP (ref 0–0.2)
BASOPHILS NFR BLD AUTO: 0.6 % — SIGNIFICANT CHANGE UP (ref 0–2)
BUN SERPL-MCNC: 13 MG/DL — SIGNIFICANT CHANGE UP (ref 7–23)
CALCIUM SERPL-MCNC: 8.5 MG/DL — SIGNIFICANT CHANGE UP (ref 8.5–10.1)
CHLORIDE SERPL-SCNC: 102 MMOL/L — SIGNIFICANT CHANGE UP (ref 96–108)
CO2 SERPL-SCNC: 30 MMOL/L — SIGNIFICANT CHANGE UP (ref 22–31)
CREAT SERPL-MCNC: 1.03 MG/DL — SIGNIFICANT CHANGE UP (ref 0.5–1.3)
EGFR: 81 ML/MIN/1.73M2 — SIGNIFICANT CHANGE UP
EOSINOPHIL # BLD AUTO: 0.38 K/UL — SIGNIFICANT CHANGE UP (ref 0–0.5)
EOSINOPHIL NFR BLD AUTO: 7.1 % — HIGH (ref 0–6)
GLUCOSE SERPL-MCNC: 109 MG/DL — HIGH (ref 70–99)
HCT VFR BLD CALC: 30.4 % — LOW (ref 39–50)
HGB BLD-MCNC: 10.4 G/DL — LOW (ref 13–17)
IMM GRANULOCYTES NFR BLD AUTO: 0.4 % — SIGNIFICANT CHANGE UP (ref 0–0.9)
LYMPHOCYTES # BLD AUTO: 0.69 K/UL — LOW (ref 1–3.3)
LYMPHOCYTES # BLD AUTO: 12.8 % — LOW (ref 13–44)
MAGNESIUM SERPL-MCNC: 2.1 MG/DL — SIGNIFICANT CHANGE UP (ref 1.6–2.6)
MCHC RBC-ENTMCNC: 33.7 PG — SIGNIFICANT CHANGE UP (ref 27–34)
MCHC RBC-ENTMCNC: 34.2 GM/DL — SIGNIFICANT CHANGE UP (ref 32–36)
MCV RBC AUTO: 98.4 FL — SIGNIFICANT CHANGE UP (ref 80–100)
MONOCYTES # BLD AUTO: 0.51 K/UL — SIGNIFICANT CHANGE UP (ref 0–0.9)
MONOCYTES NFR BLD AUTO: 9.5 % — SIGNIFICANT CHANGE UP (ref 2–14)
NEUTROPHILS # BLD AUTO: 3.76 K/UL — SIGNIFICANT CHANGE UP (ref 1.8–7.4)
NEUTROPHILS NFR BLD AUTO: 69.6 % — SIGNIFICANT CHANGE UP (ref 43–77)
PHOSPHATE SERPL-MCNC: 2.6 MG/DL — SIGNIFICANT CHANGE UP (ref 2.5–4.5)
PLATELET # BLD AUTO: 171 K/UL — SIGNIFICANT CHANGE UP (ref 150–400)
POTASSIUM SERPL-MCNC: 3.5 MMOL/L — SIGNIFICANT CHANGE UP (ref 3.5–5.3)
POTASSIUM SERPL-SCNC: 3.5 MMOL/L — SIGNIFICANT CHANGE UP (ref 3.5–5.3)
RBC # BLD: 3.09 M/UL — LOW (ref 4.2–5.8)
RBC # FLD: 11.9 % — SIGNIFICANT CHANGE UP (ref 10.3–14.5)
SODIUM SERPL-SCNC: 137 MMOL/L — SIGNIFICANT CHANGE UP (ref 135–145)
WBC # BLD: 5.39 K/UL — SIGNIFICANT CHANGE UP (ref 3.8–10.5)
WBC # FLD AUTO: 5.39 K/UL — SIGNIFICANT CHANGE UP (ref 3.8–10.5)

## 2024-04-15 PROCEDURE — 99232 SBSQ HOSP IP/OBS MODERATE 35: CPT

## 2024-04-15 PROCEDURE — 71045 X-RAY EXAM CHEST 1 VIEW: CPT | Mod: 26,76

## 2024-04-15 RX ORDER — OXYCODONE HYDROCHLORIDE 5 MG/1
10 TABLET ORAL EVERY 4 HOURS
Refills: 0 | Status: DISCONTINUED | OUTPATIENT
Start: 2024-04-15 | End: 2024-04-17

## 2024-04-15 RX ORDER — POTASSIUM CHLORIDE 20 MEQ
40 PACKET (EA) ORAL EVERY 4 HOURS
Refills: 0 | Status: COMPLETED | OUTPATIENT
Start: 2024-04-15 | End: 2024-04-15

## 2024-04-15 RX ORDER — ACETAMINOPHEN 500 MG
1000 TABLET ORAL ONCE
Refills: 0 | Status: COMPLETED | OUTPATIENT
Start: 2024-04-15 | End: 2024-04-15

## 2024-04-15 RX ORDER — OXYCODONE HYDROCHLORIDE 5 MG/1
5 TABLET ORAL EVERY 4 HOURS
Refills: 0 | Status: DISCONTINUED | OUTPATIENT
Start: 2024-04-15 | End: 2024-04-17

## 2024-04-15 RX ORDER — HYDROMORPHONE HYDROCHLORIDE 2 MG/ML
0.5 INJECTION INTRAMUSCULAR; INTRAVENOUS; SUBCUTANEOUS ONCE
Refills: 0 | Status: DISCONTINUED | OUTPATIENT
Start: 2024-04-15 | End: 2024-04-16

## 2024-04-15 RX ADMIN — Medication 5 MILLIGRAM(S): at 09:35

## 2024-04-15 RX ADMIN — HEPARIN SODIUM 5000 UNIT(S): 5000 INJECTION INTRAVENOUS; SUBCUTANEOUS at 21:17

## 2024-04-15 RX ADMIN — HEPARIN SODIUM 5000 UNIT(S): 5000 INJECTION INTRAVENOUS; SUBCUTANEOUS at 05:30

## 2024-04-15 RX ADMIN — OXYCODONE HYDROCHLORIDE 10 MILLIGRAM(S): 5 TABLET ORAL at 21:17

## 2024-04-15 RX ADMIN — Medication 40 MILLIEQUIVALENT(S): at 17:05

## 2024-04-15 RX ADMIN — ATORVASTATIN CALCIUM 20 MILLIGRAM(S): 80 TABLET, FILM COATED ORAL at 21:18

## 2024-04-15 RX ADMIN — LIDOCAINE 1 PATCH: 4 CREAM TOPICAL at 13:45

## 2024-04-15 RX ADMIN — OXYCODONE HYDROCHLORIDE 10 MILLIGRAM(S): 5 TABLET ORAL at 17:05

## 2024-04-15 RX ADMIN — POLYETHYLENE GLYCOL 3350 17 GRAM(S): 17 POWDER, FOR SOLUTION ORAL at 09:35

## 2024-04-15 RX ADMIN — OXYCODONE HYDROCHLORIDE 10 MILLIGRAM(S): 5 TABLET ORAL at 17:51

## 2024-04-15 RX ADMIN — Medication 400 MILLIGRAM(S): at 20:19

## 2024-04-15 RX ADMIN — SENNA PLUS 2 TABLET(S): 8.6 TABLET ORAL at 21:18

## 2024-04-15 RX ADMIN — Medication 5 MILLIGRAM(S): at 21:17

## 2024-04-15 RX ADMIN — Medication 40 MILLIEQUIVALENT(S): at 11:29

## 2024-04-15 RX ADMIN — Medication 1000 MILLIGRAM(S): at 20:28

## 2024-04-15 RX ADMIN — HEPARIN SODIUM 5000 UNIT(S): 5000 INJECTION INTRAVENOUS; SUBCUTANEOUS at 13:45

## 2024-04-15 NOTE — PROGRESS NOTE ADULT - ASSESSMENT
64y Male  PMHx of HTN presents to the ED c/o mid right back pain after falling on 4x4 plank today. Found to have right posterior rib fractures and a moderate PTX. S/p Right pigtail placement  4/12 s/p FB, Rt Thoracotomy and rib plating       Plan   Maintain CT to WS today   CXR in am   IS  Ambulate  Cathartics for BM   pain control with oral pain meds, PCA d/c today   encourage IS and deep breathing  Ancef for prophylaxis completed   Will d/c prevena dressing tomorrow   Discharge planning Tuesday/Wednesday   DW Dr Perez

## 2024-04-15 NOTE — PROGRESS NOTE ADULT - ASSESSMENT
64y Male  PMHx of HTN presents to the ED c/o mid right back pain after falling on 4x4 plank. Found to have right posterior rib fractures and a moderate PTX. S/p Right pigtail placement in ED, now POD3 from thoracotomy, with rib fixation using plate with CT surg. Chest tube on water seal, saturation normal.    plan:  CT surgery recommendations  AM CXR  pain control: multimodal pain control   encourage IS and deep breathing  Daily labs    Plan discussed with Dr. Shell.   64y Male  PMHx of HTN presents to the ED c/o mid right back pain after falling on 4x4 plank. Found to have right posterior rib fractures and a moderate PTX. S/p Right pigtail placement in ED, now POD3 from thoracotomy, with rib fixation using plate with CT surg. Chest tube on water seal, saturation normal.    plan:  Pt is transferred to Thoracic surgery under Dr. Chris WORLEY CXR  pain control: multimodal pain control   encourage IS and deep breathing  Daily labs  Trauma surgery will sign off    Plan discussed with Dr. Shell.   64y Male  PMHx of HTN presents to the ED c/o mid right back pain after falling on 4x4 plank. Found to have right posterior rib fractures and a moderate PTX. S/p Right pigtail placement in ED, now POD3 from thoracotomy, with rib fixation using plate with CT surg. Chest tube on water seal, saturation normal.    plan:  Pt is transferred to Thoracic surgery under Dr. Perez  AM CXR  pain control: multimodal pain control   encourage IS and deep breathing  Daily labs  Trauma surgery will sign off    Plan discussed with Dr. Shell.      Attending A/P:    Chart reviewed, patient examined, agree with above resident evaluation in addition to the following    transferred to Dr. Perez's service, constipation, start aggressive bowel reg, pain control, please call trauma team with any concerns      Pt aware of and agrees with all of the above    35 minuted of time spent on pt examination, review of relevant labs and radiologic studies, assured stabilization of pt, discussion with relevant services/providers for coordination of pt care and services

## 2024-04-15 NOTE — PROGRESS NOTE ADULT - SUBJECTIVE AND OBJECTIVE BOX
Pt. seen and examined at bedside this morning. Patient reports pain is better, denies cough. He denies fever, chest pain, SOB, nausea, vomiting.    ROS negative except as above.    Physical Exam:  General: AOx3, Well developed, NAD  HEENT: NC/AT, normal pinnae and tragi  Chest: Normal respiratory effort, equal chest rise, chest tube in place, sanguineous output, on water seal   Heart: RRR  Abdomen: Soft, NTND  Neuro/Psych: No localized deficits. Normal speech, normal tone, normal affect  Skin: Normal, warm, no rashes, no lesions noted  Extremities: Warm, well perfused, no edema    Vitals:  T(C): 37.9 (04-14 @ 20:12), Max: 37.9 (04-14 @ 20:12)  HR: 70 (04-15 @ 00:00) (70 - 95)  BP: 115/72 (04-15 @ 00:00) (115/72 - 135/76)  RR: 13 (04-15 @ 00:00) (13 - 21)  SpO2: 97% (04-15 @ 00:00) (91% - 97%)    04-13 @ 07:01  -  04-14 @ 07:00  --------------------------------------------------------  IN:  Total IN: 0 mL    OUT:    Chest Tube (mL): 70 mL    Indwelling Catheter - Urethral (mL): 750 mL    Voided (mL): 700 mL  Total OUT: 1520 mL    Total NET: -1520 mL      04-14 @ 07:01  -  04-15 @ 03:36  --------------------------------------------------------  IN:    Oral Fluid: 240 mL  Total IN: 240 mL    OUT:    Chest Tube (mL): 180 mL    Voided (mL): 1400 mL  Total OUT: 1580 mL    Total NET: -1340 mL          04-14 @ 05:24                    10.7  CBC: 7.22>)-------(<156                     31.4                 134 | 101 | 15    CMP:  ----------------------< 123               4.0 | 28 | 1.12                      Ca:8.1  Phos:-  Mg:-               -|      |-        LFTs:  ------|-|-----             -|      |-         Patient is a 64y old  Male who presents with a chief complaint of s/p fall (14 Apr 2024 13:16)      Pt. seen and examined at bedside this morning. Patient reports pain is better, denies cough. He denies fever, chest pain, SOB, nausea, vomiting.    ROS negative except as above.    Physical Exam:  General: AOx3, Well developed, NAD  HEENT: NC/AT, normal pinnae and tragi  Chest: Normal respiratory effort, equal chest rise, chest tube in place, serosang output, on water seal   Heart: RRR  Abdomen: Soft, NTND  Neuro/Psych: No localized deficits. Normal speech, normal tone, normal affect  Skin: Normal, warm, no rashes, no lesions noted  Extremities: Warm, well perfused, no edema    Vitals:  T(C): 37.9 (04-14 @ 20:12), Max: 37.9 (04-14 @ 20:12)  HR: 70 (04-15 @ 00:00) (70 - 95)  BP: 115/72 (04-15 @ 00:00) (115/72 - 135/76)  RR: 13 (04-15 @ 00:00) (13 - 21)  SpO2: 97% (04-15 @ 00:00) (91% - 97%)    04-13 @ 07:01  -  04-14 @ 07:00  --------------------------------------------------------  IN:  Total IN: 0 mL    OUT:    Chest Tube (mL): 70 mL    Indwelling Catheter - Urethral (mL): 750 mL    Voided (mL): 700 mL  Total OUT: 1520 mL    Total NET: -1520 mL      04-14 @ 07:01  -  04-15 @ 03:36  --------------------------------------------------------  IN:    Oral Fluid: 240 mL  Total IN: 240 mL    OUT:    Chest Tube (mL): 180 mL    Voided (mL): 1400 mL  Total OUT: 1580 mL    Total NET: -1340 mL          04-14 @ 05:24                    10.7  CBC: 7.22>)-------(<156                     31.4                 134 | 101 | 15    CMP:  ----------------------< 123               4.0 | 28 | 1.12                      Ca:8.1  Phos:-  Mg:-               -|      |-        LFTs:  ------|-|-----             -|      |-        < from: Xray Chest 1 View- PORTABLE-Routine (Xray Chest 1 View- PORTABLE-Routine in AM.) (04.13.24 @ 06:47) >  FINDINGS/  IMPRESSION:    First study is from 6:52 PM and shows right chest tube. Rib bleeding.   Enlarged heart. There is opacity at the left base compatible with   effusion/infiltrate.. Trace right apical pneumothorax.    The follow-up is from 4/30/2024 and shows wire overlies left chest   terminating in the region of the mediastinum. Small apical pneumothorax   is seen. Subcutaneous emphysema present..    --- End of Report ---            JT MITCHELL MD; Attending Interventional Radiologist  This document has been electronically signed. Apr 13 2024 10:53AM    < end of copied text >

## 2024-04-15 NOTE — CHART NOTE - NSCHARTNOTEFT_GEN_A_CORE
64y Male  PMHx of HTN presents to the ED c/o mid right back pain after falling on 4x4 plank. Found to have right posterior rib fractures and a moderate PTX. S/p Right pigtail placement in ED, now POD3 from thoracotomy, with rib fixation using plate with CT surg. Chest tube on water seal, saturation normal.    plan:  Pt is transferred to Thoracic surgery under Dr. Chris WORLEY CXR  pain control: multimodal pain control   encourage IS and deep breathing  Daily labs  Trauma surgery will sign off    Plan discussed with Dr. Shell.

## 2024-04-15 NOTE — PROGRESS NOTE ADULT - SUBJECTIVE AND OBJECTIVE BOX
Subjective:  Pt in bed NAD no issues overnight     T(C): 37.1 (04-15-24 @ 13:11), Max: 37.9 (04-14-24 @ 20:12)  HR: 92 (04-15-24 @ 15:00) (70 - 92)  BP: 148/79 (04-15-24 @ 15:00) (112/67 - 148/79)  ABP: --  ABP(mean): --  RR: 17 (04-15-24 @ 15:00) (12 - 20)  SpO2: 93% (04-15-24 @ 15:00) (91% - 98%) 2 L NC   Wt(kg): --  CVP(mm Hg): --  CO: --  CI: --  PA: --                                              Tele: SR     CHEST TUBE: 300cc                               OUTPUT:     per 24 hours    AIR LEAKS:  [ ] YES [ x] NO          04-15    137  |  102  |  13  ----------------------------<  109<H>  3.5   |  30  |  1.03    Ca    8.5      15 Apr 2024 05:53  Phos  2.6     04-15  Mg     2.1     04-15                                 10.4   5.39  )-----------( 171      ( 15 Apr 2024 05:53 )             30.4                 CAPILLARY BLOOD GLUCOSE               CXR:  < from: Xray Chest 1 View- PORTABLE-Urgent (Xray Chest 1 View- PORTABLE-Urgent .) (04.15.24 @ 11:23) >  INTERPRETATION:  AP chest on April 15, 2024 at 7:29 AM. Patient had   recently.    Heart magnified by technique. Several orthopedic repairs of right ribs   again noted. Right chest tube remains.    There is a persistent moderate right base pleural pulmonary reaction   similar to April 14.    On April 14 there was a left base reaction joint significant improvement.   No pneumothorax.    Follow-up AP chest on April 15, 2024 11:11 AM. Stable findings.    IMPRESSION: Stable right base reaction. Improved left base reaction.    < end of copied text >          Exam   Neuro:  Alert Awake NAD   Pulm:  Decreased at bases b/l   CV: RRR  Abd:  soft   Extremities:  warm   Inc : Rt Thorax C/D/I  + Per vena dressing          Assessment:  64yMale    with PAST MEDICAL & SURGICAL HISTORY:        Plan:

## 2024-04-16 ENCOUNTER — TRANSCRIPTION ENCOUNTER (OUTPATIENT)
Age: 64
End: 2024-04-16

## 2024-04-16 LAB
ANION GAP SERPL CALC-SCNC: 4 MMOL/L — LOW (ref 5–17)
BUN SERPL-MCNC: 15 MG/DL — SIGNIFICANT CHANGE UP (ref 7–23)
CALCIUM SERPL-MCNC: 9 MG/DL — SIGNIFICANT CHANGE UP (ref 8.5–10.1)
CHLORIDE SERPL-SCNC: 101 MMOL/L — SIGNIFICANT CHANGE UP (ref 96–108)
CO2 SERPL-SCNC: 29 MMOL/L — SIGNIFICANT CHANGE UP (ref 22–31)
CREAT SERPL-MCNC: 0.95 MG/DL — SIGNIFICANT CHANGE UP (ref 0.5–1.3)
EGFR: 89 ML/MIN/1.73M2 — SIGNIFICANT CHANGE UP
GLUCOSE SERPL-MCNC: 116 MG/DL — HIGH (ref 70–99)
HCT VFR BLD CALC: 32.5 % — LOW (ref 39–50)
HGB BLD-MCNC: 11.1 G/DL — LOW (ref 13–17)
MCHC RBC-ENTMCNC: 33.6 PG — SIGNIFICANT CHANGE UP (ref 27–34)
MCHC RBC-ENTMCNC: 34.2 GM/DL — SIGNIFICANT CHANGE UP (ref 32–36)
MCV RBC AUTO: 98.5 FL — SIGNIFICANT CHANGE UP (ref 80–100)
PLATELET # BLD AUTO: 206 K/UL — SIGNIFICANT CHANGE UP (ref 150–400)
POTASSIUM SERPL-MCNC: 4.1 MMOL/L — SIGNIFICANT CHANGE UP (ref 3.5–5.3)
POTASSIUM SERPL-SCNC: 4.1 MMOL/L — SIGNIFICANT CHANGE UP (ref 3.5–5.3)
RBC # BLD: 3.3 M/UL — LOW (ref 4.2–5.8)
RBC # FLD: 11.9 % — SIGNIFICANT CHANGE UP (ref 10.3–14.5)
SODIUM SERPL-SCNC: 134 MMOL/L — LOW (ref 135–145)
WBC # BLD: 4.71 K/UL — SIGNIFICANT CHANGE UP (ref 3.8–10.5)
WBC # FLD AUTO: 4.71 K/UL — SIGNIFICANT CHANGE UP (ref 3.8–10.5)

## 2024-04-16 PROCEDURE — 71045 X-RAY EXAM CHEST 1 VIEW: CPT | Mod: 26

## 2024-04-16 PROCEDURE — 99024 POSTOP FOLLOW-UP VISIT: CPT

## 2024-04-16 RX ORDER — HYDROMORPHONE HYDROCHLORIDE 2 MG/ML
0.5 INJECTION INTRAMUSCULAR; INTRAVENOUS; SUBCUTANEOUS ONCE
Refills: 0 | Status: DISCONTINUED | OUTPATIENT
Start: 2024-04-16 | End: 2024-04-16

## 2024-04-16 RX ORDER — ACETAMINOPHEN 500 MG
1000 TABLET ORAL ONCE
Refills: 0 | Status: COMPLETED | OUTPATIENT
Start: 2024-04-16 | End: 2024-04-16

## 2024-04-16 RX ORDER — OXYCODONE HYDROCHLORIDE 5 MG/1
1 TABLET ORAL
Qty: 28 | Refills: 0
Start: 2024-04-16

## 2024-04-16 RX ORDER — SENNA PLUS 8.6 MG/1
2 TABLET ORAL
Qty: 0 | Refills: 0 | DISCHARGE
Start: 2024-04-16

## 2024-04-16 RX ORDER — PSYLLIUM SEED (WITH DEXTROSE)
3.4 POWDER (GRAM) ORAL
Refills: 0 | DISCHARGE

## 2024-04-16 RX ADMIN — HEPARIN SODIUM 5000 UNIT(S): 5000 INJECTION INTRAVENOUS; SUBCUTANEOUS at 13:27

## 2024-04-16 RX ADMIN — OXYCODONE HYDROCHLORIDE 10 MILLIGRAM(S): 5 TABLET ORAL at 10:30

## 2024-04-16 RX ADMIN — HEPARIN SODIUM 5000 UNIT(S): 5000 INJECTION INTRAVENOUS; SUBCUTANEOUS at 21:11

## 2024-04-16 RX ADMIN — OXYCODONE HYDROCHLORIDE 10 MILLIGRAM(S): 5 TABLET ORAL at 01:06

## 2024-04-16 RX ADMIN — HYDROMORPHONE HYDROCHLORIDE 0.5 MILLIGRAM(S): 2 INJECTION INTRAMUSCULAR; INTRAVENOUS; SUBCUTANEOUS at 13:44

## 2024-04-16 RX ADMIN — Medication 5 MILLIGRAM(S): at 21:11

## 2024-04-16 RX ADMIN — OXYCODONE HYDROCHLORIDE 10 MILLIGRAM(S): 5 TABLET ORAL at 20:30

## 2024-04-16 RX ADMIN — OXYCODONE HYDROCHLORIDE 10 MILLIGRAM(S): 5 TABLET ORAL at 06:10

## 2024-04-16 RX ADMIN — ONDANSETRON 4 MILLIGRAM(S): 8 TABLET, FILM COATED ORAL at 05:33

## 2024-04-16 RX ADMIN — POLYETHYLENE GLYCOL 3350 17 GRAM(S): 17 POWDER, FOR SOLUTION ORAL at 09:57

## 2024-04-16 RX ADMIN — OXYCODONE HYDROCHLORIDE 10 MILLIGRAM(S): 5 TABLET ORAL at 09:38

## 2024-04-16 RX ADMIN — Medication 5 MILLIGRAM(S): at 09:57

## 2024-04-16 RX ADMIN — LIDOCAINE 1 PATCH: 4 CREAM TOPICAL at 21:34

## 2024-04-16 RX ADMIN — OXYCODONE HYDROCHLORIDE 10 MILLIGRAM(S): 5 TABLET ORAL at 05:13

## 2024-04-16 RX ADMIN — OXYCODONE HYDROCHLORIDE 10 MILLIGRAM(S): 5 TABLET ORAL at 16:00

## 2024-04-16 RX ADMIN — SENNA PLUS 2 TABLET(S): 8.6 TABLET ORAL at 21:11

## 2024-04-16 RX ADMIN — HEPARIN SODIUM 5000 UNIT(S): 5000 INJECTION INTRAVENOUS; SUBCUTANEOUS at 05:13

## 2024-04-16 RX ADMIN — ATORVASTATIN CALCIUM 20 MILLIGRAM(S): 80 TABLET, FILM COATED ORAL at 21:11

## 2024-04-16 RX ADMIN — OXYCODONE HYDROCHLORIDE 10 MILLIGRAM(S): 5 TABLET ORAL at 00:04

## 2024-04-16 RX ADMIN — OXYCODONE HYDROCHLORIDE 10 MILLIGRAM(S): 5 TABLET ORAL at 23:59

## 2024-04-16 RX ADMIN — LIDOCAINE 1 PATCH: 4 CREAM TOPICAL at 09:57

## 2024-04-16 RX ADMIN — OXYCODONE HYDROCHLORIDE 10 MILLIGRAM(S): 5 TABLET ORAL at 15:02

## 2024-04-16 RX ADMIN — Medication 400 MILLIGRAM(S): at 05:32

## 2024-04-16 RX ADMIN — OXYCODONE HYDROCHLORIDE 10 MILLIGRAM(S): 5 TABLET ORAL at 04:08

## 2024-04-16 RX ADMIN — Medication 1000 MILLIGRAM(S): at 06:08

## 2024-04-16 RX ADMIN — OXYCODONE HYDROCHLORIDE 10 MILLIGRAM(S): 5 TABLET ORAL at 20:06

## 2024-04-16 RX ADMIN — HYDROMORPHONE HYDROCHLORIDE 0.5 MILLIGRAM(S): 2 INJECTION INTRAMUSCULAR; INTRAVENOUS; SUBCUTANEOUS at 13:24

## 2024-04-16 NOTE — PROGRESS NOTE ADULT - SUBJECTIVE AND OBJECTIVE BOX
Subjective: Pt in bed NAD no issues overnight. CT d/c without issue     T(C): 36.7 (04-16-24 @ 13:21), Max: 37.7 (04-15-24 @ 20:58)  HR: 81 (04-16-24 @ 12:00) (71 - 92)  BP: 130/73 (04-16-24 @ 12:00) (115/68 - 148/79)  ABP: --  ABP(mean): --  RR: 18 (04-16-24 @ 12:00) (12 - 23)  SpO2: 95% (04-16-24 @ 12:00) (89% - 98%) RA  Wt(kg): --  CVP(mm Hg): --  CO: --  CI: --  PA: --                                              Tele: SR     CHEST TUBE:  50cc                             OUTPUT:     per 24 hours    AIR LEAKS:  [ ] YES [ x] NO          04-16    134<L>  |  101  |  15  ----------------------------<  116<H>  4.1   |  29  |  0.95    Ca    9.0      16 Apr 2024 05:34  Phos  2.6     04-15  Mg     2.1     04-15                                 11.1   4.71  )-----------( 206      ( 16 Apr 2024 05:34 )             32.5                 CAPILLARY BLOOD GLUCOSE               CXR: < from: Xray Chest 1 View- PORTABLE-Urgent (Xray Chest 1 View- PORTABLE-Urgent .) (04.15.24 @ 11:23) >    INTERPRETATION:  AP chest on April 15, 2024 at 7:29 AM. Patient had   recently.    Heart magnified by technique. Several orthopedic repairs of right ribs   again noted. Right chest tube remains.    There is a persistent moderate right base pleural pulmonary reaction   similar to April 14.    On April 14 there was a left base reaction joint significant improvement.   No pneumothorax.    Follow-up AP chest on April 15, 2024 11:11 AM. Stable findings.    IMPRESSION: Stable right base reaction. Improved left base reaction.    < end of copied text >          Exam  Neuro: Alert Awake NAD   Pulm: decreased b/l  CV: RRR  Abd: soft   Extremities: warm   inc: Rt Thoracotomy C/D/I  + staples  + suture CT insertion site          Assessment:  64yMale    with PAST MEDICAL & SURGICAL HISTORY:

## 2024-04-16 NOTE — DISCHARGE NOTE PROVIDER - PROVIDER TOKENS
FREE:[LAST:[Chris],FIRST:[Ed],PHONE:[phone]],FAX:[(   )    -],ADDRESS:[[facility] [address]]

## 2024-04-16 NOTE — PROGRESS NOTE ADULT - ASSESSMENT
64y Male  PMHx of HTN presents to the ED c/o mid right back pain after falling on 4x4 plank today. Found to have right posterior rib fractures and a moderate PTX. S/p Right pigtail placement  4/12 s/p FB, Rt Thoracotomy and rib plating       Plan   CT d/c , CXR without PTX   CXR in am   IS  Ambulate  Cathartics for BM   pain control with oral pain meds, PCA d/c today   encourage IS and deep breathing  Ancef for prophylaxis completed   prevena d/c today   Discharge planning Wednesday   TIFFANY Perez

## 2024-04-16 NOTE — DISCHARGE NOTE PROVIDER - NSDCMRMEDTOKEN_GEN_ALL_CORE_FT
ammonium lactate 12% topical lotion: Apply topically to affected area 2 times a day as needed for  rash  Centrum Silver oral tablet: 1 tab(s) orally once a day  ciclopirox 0.77% topical cream: Apply topically to affected area 2 times a day as needed for  rash  oxyCODONE 5 mg oral tablet: 1 tab(s) orally every 6 hours as needed for Moderate Pain (4 - 6) MDD: 4  rosuvastatin 5 mg oral tablet: 1 tab(s) orally once a day (in the morning)  senna leaf extract oral tablet: 2 tab(s) orally once a day (at bedtime)  tadalafil 5 mg oral tablet: 1 tab(s) orally prn as needed for intercourse

## 2024-04-16 NOTE — DISCHARGE NOTE PROVIDER - HOSPITAL COURSE
64y Male  PMHx of HTN presents to the ED c/o mid right back pain after falling on 4x4 plank today. Found to have right posterior rib fractures and a moderate PTX. S/p Right pigtail placement  4/12 s/p FB, Rt Thoracotomy and rib plating

## 2024-04-16 NOTE — DISCHARGE NOTE PROVIDER - NSDCFUSCHEDAPPT_GEN_ALL_CORE_FT
Jacob Tompkins  [facility]  [facility] [address]  Scheduled Appointment: 05/13/2024     [facility] [address]  Scheduled Appointment: 04/26/2024    Jacob Tompkins  [facility]  [facility] [address]  Scheduled Appointment: 05/13/2024

## 2024-04-16 NOTE — DISCHARGE NOTE PROVIDER - NSDCACTIVITY_GEN_ALL_CORE
"Patient states that her goals for the next three weeks include:    Be more clear with spouse about some things  Continue to try to go to the [employer]  \"Focus on what I can do\"  "
Do not drive or operate machinery/Showering allowed/Do not make important decisions/Stairs allowed/Walking - Indoors allowed/No heavy lifting/straining/Walking - Outdoors allowed

## 2024-04-16 NOTE — DISCHARGE NOTE PROVIDER - NSDCPNSUBOBJ_GEN_ALL_CORE
Subjective:  Pt in bed NAD no issues overnight     T(C): 37.3 (04-17-24 @ 07:18), Max: 37.4 (04-16-24 @ 18:01)  HR: 66 (04-17-24 @ 07:18) (66 - 84)  BP: 137/53 (04-17-24 @ 07:18) (117/61 - 137/53)  ABP: --  ABP(mean): --  RR: 18 (04-17-24 @ 07:18) (18 - 18)  SpO2: 93% (04-17-24 @ 07:18) (93% - 95%) RA   Wt(kg): --  CVP(mm Hg): --  CO: --  CI: --  PA: --                                              Tele: SR             04-16    134<L>  |  101  |  15  ----------------------------<  116<H>  4.1   |  29  |  0.95    Ca    9.0      16 Apr 2024 05:34                                 11.1   4.71  )-----------( 206      ( 16 Apr 2024 05:34 )             32.5                 CAPILLARY BLOOD GLUCOSE               CXR:  < from: Xray Chest 1 View- PORTABLE-Urgent (Xray Chest 1 View- PORTABLE-Urgent .) (04.16.24 @ 13:37) >    INTERPRETATION:  AP chest on April 16, 2024 at 6:50 AM.    Heart magnified by technique. Orthopedic repair with hardware several   ribs and right chest tube remain.    There is a persistent right base pleural pulmonary reaction showing some   improvement from April 15.    Slight left base infiltrate is unchanged. No pneumothorax.    Follow-up AP chest on April 16, 2024 at 1:22 PM. Right chest tube   removed. Basilar findings slightly improved.    IMPRESSION: Right chest tube removed without incident. Improving   bibasilar findings.    There is an orthopedic device seen on the upper shaft of the left humerus.    < end of copied text >      Exam  Neuro: Alert Awake NAD   Pulm: decreased b/l  CV: RRR  Abd: soft   Extremities: warm   inc: Rt Thoracotomy C/D/I  + staples  + suture CT insertion site                Assessment:  64yMale    with PAST MEDICAL & SURGICAL HISTORY:          64y Male  PMHx of HTN presents to the ED c/o mid right back pain after falling on 4x4 plank today. Found to have right posterior rib fractures and a moderate PTX. S/p Right pigtail placement  4/12 s/p FB, Rt Thoracotomy and rib plating       Plan   Plan to d/c today   IS  Ambulate  Cathartics for BM   pain control with oral pain meds, PCA d/c today   encourage IS and deep breathing  Ancef for prophylaxis completed   pt to follow up next friday with Dr Chris Perez

## 2024-04-16 NOTE — DISCHARGE NOTE PROVIDER - NSDCCPCAREPLAN_GEN_ALL_CORE_FT
PRINCIPAL DISCHARGE DIAGNOSIS  Diagnosis: Traumatic fracture of ribs with pneumothorax  Assessment and Plan of Treatment:

## 2024-04-16 NOTE — DISCHARGE NOTE PROVIDER - NSDCCPTREATMENT_GEN_ALL_CORE_FT
PRINCIPAL PROCEDURE  Procedure: Thoracotomy, with rib fixation using plate  Findings and Treatment:       SECONDARY PROCEDURE  Procedure: Thoracostomy for drainage of hemothorax  Findings and Treatment:     Procedure: Bronchoscopy, flexible  Findings and Treatment:

## 2024-04-16 NOTE — DISCHARGE NOTE PROVIDER - NSDCFUADDAPPT_GEN_ALL_CORE_FT
Leave dressing intact until tomorrow evening, reinforcing with tape if necessary (about 36 hours from chest tube removal). At that time you may remove the dressing and take a shower. Place clean gauze over wound if continual drainage. Call Dr. Perez's office at [phone] tomorrow or the next business day to make a followup appointment. Call the office if you experience any fevers, shortness of breath, chest pain or excessive drainage from the incision, day or night. Go to the emergency room if any of these symptoms are severe. Take your medications as ordered and take a stool softener if needed with the narcotic medications.   Call Dr. Perez's office at [phone] tomorrow or the next business day to make a followup appointment. Call the office if you experience any fevers, shortness of breath, chest pain or excessive drainage from the incision, day or night. Go to the emergency room if any of these symptoms are severe. Take your medications as ordered and take a stool softener if needed with the narcotic medications.     Please remove dressing tonight and shower tonight     Please follow up with Dr Perez on 4/26 at 1:45 at [facility]

## 2024-04-17 ENCOUNTER — TRANSCRIPTION ENCOUNTER (OUTPATIENT)
Age: 64
End: 2024-04-17

## 2024-04-17 ENCOUNTER — APPOINTMENT (OUTPATIENT)
Dept: DERMATOLOGY | Facility: CLINIC | Age: 64
End: 2024-04-17

## 2024-04-17 VITALS — WEIGHT: 225.09 LBS

## 2024-04-17 PROCEDURE — 99238 HOSP IP/OBS DSCHRG MGMT 30/<: CPT

## 2024-04-17 PROCEDURE — 71045 X-RAY EXAM CHEST 1 VIEW: CPT | Mod: 26

## 2024-04-17 RX ADMIN — Medication 10 MILLIGRAM(S): at 09:58

## 2024-04-17 RX ADMIN — OXYCODONE HYDROCHLORIDE 10 MILLIGRAM(S): 5 TABLET ORAL at 00:30

## 2024-04-17 RX ADMIN — POLYETHYLENE GLYCOL 3350 17 GRAM(S): 17 POWDER, FOR SOLUTION ORAL at 09:57

## 2024-04-17 RX ADMIN — Medication 5 MILLIGRAM(S): at 09:57

## 2024-04-17 RX ADMIN — LIDOCAINE 1 PATCH: 4 CREAM TOPICAL at 09:58

## 2024-04-17 RX ADMIN — HEPARIN SODIUM 5000 UNIT(S): 5000 INJECTION INTRAVENOUS; SUBCUTANEOUS at 04:43

## 2024-04-17 RX ADMIN — OXYCODONE HYDROCHLORIDE 10 MILLIGRAM(S): 5 TABLET ORAL at 05:13

## 2024-04-17 RX ADMIN — OXYCODONE HYDROCHLORIDE 10 MILLIGRAM(S): 5 TABLET ORAL at 09:57

## 2024-04-17 RX ADMIN — OXYCODONE HYDROCHLORIDE 10 MILLIGRAM(S): 5 TABLET ORAL at 04:43

## 2024-04-17 NOTE — DIETITIAN INITIAL EVALUATION ADULT - PERTINENT MEDS FT
MEDICATIONS  (STANDING):  acetaminophen   IVPB .. 1000 milliGRAM(s) IV Intermittent once  atorvastatin 20 milliGRAM(s) Oral at bedtime  bisacodyl 5 milliGRAM(s) Oral every 12 hours  chlorhexidine 4% Liquid 1 Application(s) Topical <User Schedule>  heparin   Injectable 5000 Unit(s) SubCutaneous every 8 hours  lidocaine   4% Patch 1 Patch Transdermal daily  polyethylene glycol 3350 17 Gram(s) Oral daily  senna 2 Tablet(s) Oral at bedtime    MEDICATIONS  (PRN):  bisacodyl Suppository 10 milliGRAM(s) Rectal daily PRN Constipation  dexAMETHasone  Injectable 4 milliGRAM(s) IV Push every 6 hours PRN Nausea  ondansetron Injectable 4 milliGRAM(s) IV Push every 6 hours PRN Nausea  oxyCODONE    IR 10 milliGRAM(s) Oral every 4 hours PRN Severe Pain (7 - 10)  oxyCODONE    IR 5 milliGRAM(s) Oral every 4 hours PRN Moderate Pain (4 - 6)  prochlorperazine   Injectable 5 milliGRAM(s) IntraMuscular every 8 hours PRN Nausea/or vomiting

## 2024-04-17 NOTE — DISCHARGE NOTE NURSING/CASE MANAGEMENT/SOCIAL WORK - NSDCPEFALRISK_GEN_ALL_CORE
For information on Fall & Injury Prevention, visit: [email or web address] OR  [email or web address] OR  https://www.cdc.gov/steadi/patient.html

## 2024-04-17 NOTE — DIETITIAN INITIAL EVALUATION ADULT - ORAL INTAKE PTA/DIET HISTORY
Lives at home w/ his spouse. Endorses normally good appetite PTA. Does not follow specific diet restrictions at home. Takes metamucil daily to promote BM.

## 2024-04-17 NOTE — DIETITIAN INITIAL EVALUATION ADULT - PERTINENT LABORATORY DATA
04-16    134<L>  |  101  |  15  ----------------------------<  116<H>  4.1   |  29  |  0.95    Ca    9.0      16 Apr 2024 05:34

## 2024-04-17 NOTE — DIETITIAN INITIAL EVALUATION ADULT - OTHER INFO
63 y/o Male w/ PMHx of HTN, s/p nephrectomy for donation presents to the ED c/o mid right back pain after falling on 4x4 plank today. Found to have right posterior rib fractures and a moderate PTX. S/p Right pigtail placement by trauma team.     S/p Thoracotomy, with rib fixation using plate and chest tube placement w/ thoracic surgery on 4/12/24. Tx to med surg on 4/16/24. W/o BM since admission, has received multiple bowel regimens. Takes metamucil at home to keep BMs regular, pending bowel movement in order to d/c home. S/p surgery states his appetite decreased slightly and taste changed (per pt- "sugar foods tasted sweeter and salty foods tasted saltier than normal") however return of appetite x 2 days ago. UBW stated at 225#, suspects recent wt loss 2/2 surgery. Unable to obtain bed scale wt 2/2 sitting in chair upon assessment. NFPE reveals only facial wasting at this time - at high risk of PCM. See recs below.

## 2024-04-17 NOTE — DISCHARGE NOTE NURSING/CASE MANAGEMENT/SOCIAL WORK - NSDCFUADDAPPT_GEN_ALL_CORE_FT
Leave dressing intact until tomorrow evening, reinforcing with tape if necessary (about 36 hours from chest tube removal). At that time you may remove the dressing and take a shower. Place clean gauze over wound if continual drainage. Call Dr. Perez's office at [phone] tomorrow or the next business day to make a followup appointment. Call the office if you experience any fevers, shortness of breath, chest pain or excessive drainage from the incision, day or night. Go to the emergency room if any of these symptoms are severe. Take your medications as ordered and take a stool softener if needed with the narcotic medications.

## 2024-04-17 NOTE — DISCHARGE NOTE NURSING/CASE MANAGEMENT/SOCIAL WORK - PATIENT PORTAL LINK FT
You can access the FollowMyHealth Patient Portal offered by [facility] by registering at the following website: [email or web address]. By joining [employer]’s FollowMyHealth portal, you will also be able to view your health information using other applications (apps) compatible with our system.

## 2024-04-17 NOTE — DIETITIAN INITIAL EVALUATION ADULT - ADD RECOMMEND
1) C/w regular diet. Encourage protein-rich foods, maximize food preferences   2) Consider implementing stronger bowel regimen as w/o BM since admission  3) MVI w/ minerals daily to ensure 100% RDA met   4) Obtain weekly wt to track/trend changes   5) Monitor daily lytes/min and replete prn. Consider to obtain vitamin D 25OH level to assess nutriture   RD will continue to monitor PO intake, labs, hydration, and wt prn.

## 2024-04-18 ENCOUNTER — NON-APPOINTMENT (OUTPATIENT)
Age: 64
End: 2024-04-18

## 2024-04-18 LAB — SURGICAL PATHOLOGY STUDY: SIGNIFICANT CHANGE UP

## 2024-04-19 ENCOUNTER — APPOINTMENT (OUTPATIENT)
Dept: INTERNAL MEDICINE | Facility: CLINIC | Age: 64
End: 2024-04-19
Payer: COMMERCIAL

## 2024-04-19 VITALS
WEIGHT: 225 LBS | RESPIRATION RATE: 16 BRPM | DIASTOLIC BLOOD PRESSURE: 70 MMHG | OXYGEN SATURATION: 95 % | BODY MASS INDEX: 27.4 KG/M2 | HEIGHT: 76 IN | HEART RATE: 85 BPM | SYSTOLIC BLOOD PRESSURE: 130 MMHG | TEMPERATURE: 99.1 F

## 2024-04-19 DIAGNOSIS — R07.81 PLEURODYNIA: ICD-10-CM

## 2024-04-19 DIAGNOSIS — S22.41XA MULTIPLE FRACTURES OF RIBS, RIGHT SIDE, INITIAL ENCOUNTER FOR CLOSED FRACTURE: ICD-10-CM

## 2024-04-19 PROCEDURE — 99214 OFFICE O/P EST MOD 30 MIN: CPT

## 2024-04-19 RX ORDER — FLUTICASONE PROPIONATE 50 UG/1
50 SPRAY, METERED NASAL DAILY
Qty: 1 | Refills: 2 | Status: DISCONTINUED | COMMUNITY
Start: 2023-02-15 | End: 2024-04-19

## 2024-04-25 ENCOUNTER — APPOINTMENT (OUTPATIENT)
Dept: RADIOLOGY | Facility: CLINIC | Age: 64
End: 2024-04-25
Payer: COMMERCIAL

## 2024-04-25 ENCOUNTER — OUTPATIENT (OUTPATIENT)
Dept: OUTPATIENT SERVICES | Facility: HOSPITAL | Age: 64
LOS: 1 days | End: 2024-04-25
Payer: COMMERCIAL

## 2024-04-25 DIAGNOSIS — S22.41XA MULTIPLE FRACTURES OF RIBS, RIGHT SIDE, INITIAL ENCOUNTER FOR CLOSED FRACTURE: ICD-10-CM

## 2024-04-25 DIAGNOSIS — Z00.8 ENCOUNTER FOR OTHER GENERAL EXAMINATION: ICD-10-CM

## 2024-04-25 DIAGNOSIS — J93.9 PNEUMOTHORAX, UNSPECIFIED: ICD-10-CM

## 2024-04-25 PROCEDURE — 71046 X-RAY EXAM CHEST 2 VIEWS: CPT | Mod: 26

## 2024-04-25 PROCEDURE — 71046 X-RAY EXAM CHEST 2 VIEWS: CPT

## 2024-04-26 ENCOUNTER — APPOINTMENT (OUTPATIENT)
Dept: THORACIC SURGERY | Facility: CLINIC | Age: 64
End: 2024-04-26
Payer: COMMERCIAL

## 2024-04-26 VITALS
HEART RATE: 82 BPM | HEIGHT: 76 IN | WEIGHT: 217 LBS | DIASTOLIC BLOOD PRESSURE: 75 MMHG | RESPIRATION RATE: 16 BRPM | BODY MASS INDEX: 26.42 KG/M2 | SYSTOLIC BLOOD PRESSURE: 133 MMHG | OXYGEN SATURATION: 98 %

## 2024-04-26 DIAGNOSIS — Z90.5 ACQUIRED ABSENCE OF KIDNEY: ICD-10-CM
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
## 2024-04-26 DIAGNOSIS — Z96.659 PRESENCE OF UNSPECIFIED ARTIFICIAL KNEE JOINT: ICD-10-CM

## 2024-04-26 DIAGNOSIS — S22.41XA MULTIPLE FRACTURES OF RIBS, RIGHT SIDE, INITIAL ENCOUNTER FOR CLOSED FRACTURE: ICD-10-CM

## 2024-04-26 DIAGNOSIS — S27.2XXA TRAUMATIC HEMOPNEUMOTHORAX, INITIAL ENCOUNTER: ICD-10-CM
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
## 2024-04-26 DIAGNOSIS — I10 ESSENTIAL (PRIMARY) HYPERTENSION: ICD-10-CM

## 2024-04-26 PROCEDURE — 99024 POSTOP FOLLOW-UP VISIT: CPT

## 2024-04-26 NOTE — DISCUSSION/SUMMARY
[Doing Well] : is doing well [Excellent Pain Control] : has excellent pain control [No Sign of Infection] : is showing no signs of infection [1] : 1 [Remove Sutures/Staples] : removed sutures/staples [de-identified] : 23 staples and 1 suture removed

## 2024-04-26 NOTE — REASON FOR VISIT
[de-identified] : Flexible bronchoscopy, Right thoracotomy with evacuation of hemothorax, open reduction and internal fixation of three ribs; 7, 8, and 9, with rib plating. [de-identified] : 4/12/2024

## 2024-04-26 NOTE — ASSESSMENT
[FreeTextEntry1] :  Mr. ROLY CARRASCO, 64 year old male, PMHx of HTN presents to the ED c/o mid right back pain after falling on 4x4 plank today. Found to have right posterior rib fractures and a moderate PTX.   Now s/p Flexible bronchoscopy, Right thoracotomy with evacuation of hemothorax, open reduction and internal fixation of three ribs; 7, 8, and 9, with rib plating on 4/12/2024. Path of portion of rib (fifth)- Region of medullary hemorrhage and fibrin; consistent with fracture. Normocellular hematopoietic marrow with trilineage maturation. portion of 8th rib hemorrhagic firm bone fragment.    CXR on 4/25/2024- reviewed   Patient presents today for follow up. He denies any CP, SOB, cough or hemoptysis. Takes extra strength tylenol for surgical site pain as needed.   I have reviewed the patient's medical records and diagnostic images at time of this office consultation and have made the following recommendation: 1. CXR reviewed, stable findings. Recommended to follow up 1 month to re-evaluate via TTM.  I, JOSEPH Elliott, personally performed the evaluation and management (E/M) services for this established patient who presents today with (a) new problem(s)/exacerbation of (an) existing condition(s).  That E/M includes conducting the examination, assessing all new/exacerbated conditions, and establishing a new plan of care.  Today, my ACP, Perri Fields/SEMAJ Ge, was here to observe my evaluation and management services for this new problem/exacerbated condition to be followed going forward.

## 2024-04-26 NOTE — PHYSICAL EXAM
[] : no respiratory distress [Auscultation Breath Sounds / Voice Sounds] : lungs were clear to auscultation bilaterally [Heart Rate And Rhythm] : heart rate was normal and rhythm regular [Clean] : clean [Dry] : dry [Healing Well] : healing well [Bleeding] : no active bleeding [Foul Odor] : no foul smell [Purulent Drainage] : no purulent drainage [Serosanguinous Drainage] : no serosanguinous drainage [Erythema] : not erythematous [Warm] : not warm [Tender] : not tender

## 2024-04-29 ENCOUNTER — NON-APPOINTMENT (OUTPATIENT)
Age: 64
End: 2024-04-29

## 2024-05-10 LAB
25(OH)D3 SERPL-MCNC: 40.2 NG/ML
ALBUMIN SERPL ELPH-MCNC: 4.2 G/DL
ALP BLD-CCNC: 99 U/L
ALT SERPL-CCNC: 22 U/L
ANION GAP SERPL CALC-SCNC: 13 MMOL/L
AST SERPL-CCNC: 22 U/L
BASOPHILS # BLD AUTO: 0.03 K/UL
BASOPHILS NFR BLD AUTO: 0.8 %
BILIRUB SERPL-MCNC: 0.6 MG/DL
BUN SERPL-MCNC: 19 MG/DL
CALCIUM SERPL-MCNC: 9.4 MG/DL
CHLORIDE SERPL-SCNC: 103 MMOL/L
CHOLEST SERPL-MCNC: 172 MG/DL
CO2 SERPL-SCNC: 22 MMOL/L
CREAT SERPL-MCNC: 1.12 MG/DL
EGFR: 73 ML/MIN/1.73M2
EOSINOPHIL # BLD AUTO: 0.45 K/UL
EOSINOPHIL NFR BLD AUTO: 11.9 %
ESTIMATED AVERAGE GLUCOSE: 100 MG/DL
GLUCOSE SERPL-MCNC: 92 MG/DL
HBA1C MFR BLD HPLC: 5.1 %
HCT VFR BLD CALC: 38 %
HDLC SERPL-MCNC: 51 MG/DL
HGB BLD-MCNC: 12.6 G/DL
IMM GRANULOCYTES NFR BLD AUTO: 0.3 %
IRON SERPL-MCNC: 77 UG/DL
LDLC SERPL CALC-MCNC: 106 MG/DL
LYMPHOCYTES # BLD AUTO: 0.97 K/UL
LYMPHOCYTES NFR BLD AUTO: 25.7 %
MAN DIFF?: NORMAL
MCHC RBC-ENTMCNC: 32.8 PG
MCHC RBC-ENTMCNC: 33.2 GM/DL
MCV RBC AUTO: 99 FL
MONOCYTES # BLD AUTO: 0.32 K/UL
MONOCYTES NFR BLD AUTO: 8.5 %
NEUTROPHILS # BLD AUTO: 1.99 K/UL
NEUTROPHILS NFR BLD AUTO: 52.8 %
NONHDLC SERPL-MCNC: 120 MG/DL
PLATELET # BLD AUTO: 165 K/UL
POTASSIUM SERPL-SCNC: 4.6 MMOL/L
PROT SERPL-MCNC: 6.3 G/DL
PSA SERPL-MCNC: 0.79 NG/ML
RBC # BLD: 3.84 M/UL
RBC # FLD: 12.7 %
SODIUM SERPL-SCNC: 138 MMOL/L
TRIGL SERPL-MCNC: 75 MG/DL
TSH SERPL-ACNC: 1.65 UIU/ML
WBC # FLD AUTO: 3.77 K/UL

## 2024-05-13 LAB
APPEARANCE: CLEAR
BACTERIA: NEGATIVE /HPF
BILIRUBIN URINE: NEGATIVE
BLOOD URINE: NEGATIVE
CAST: 0 /LPF
COLOR: YELLOW
EPITHELIAL CELLS: 0 /HPF
GLUCOSE QUALITATIVE U: NEGATIVE MG/DL
KETONES URINE: NEGATIVE MG/DL
LEUKOCYTE ESTERASE URINE: NEGATIVE
MICROSCOPIC-UA: NORMAL
NITRITE URINE: NEGATIVE
PH URINE: 6
PROTEIN URINE: NORMAL MG/DL
RED BLOOD CELLS URINE: 0 /HPF
SPECIFIC GRAVITY URINE: 1.02
UROBILINOGEN URINE: 0.2 MG/DL
WHITE BLOOD CELLS URINE: 1 /HPF

## 2024-05-15 ENCOUNTER — APPOINTMENT (OUTPATIENT)
Dept: INTERNAL MEDICINE | Facility: CLINIC | Age: 64
End: 2024-05-15
Payer: COMMERCIAL

## 2024-05-15 ENCOUNTER — NON-APPOINTMENT (OUTPATIENT)
Age: 64
End: 2024-05-15

## 2024-05-15 VITALS
BODY MASS INDEX: 26.18 KG/M2 | SYSTOLIC BLOOD PRESSURE: 120 MMHG | OXYGEN SATURATION: 96 % | WEIGHT: 215 LBS | DIASTOLIC BLOOD PRESSURE: 80 MMHG | TEMPERATURE: 98.1 F | RESPIRATION RATE: 15 BRPM | HEART RATE: 84 BPM | HEIGHT: 76 IN

## 2024-05-15 DIAGNOSIS — Z52.4 KIDNEY DONOR: ICD-10-CM

## 2024-05-15 DIAGNOSIS — Z78.9 OTHER SPECIFIED HEALTH STATUS: ICD-10-CM

## 2024-05-15 DIAGNOSIS — E78.5 HYPERLIPIDEMIA, UNSPECIFIED: ICD-10-CM

## 2024-05-15 DIAGNOSIS — Z12.11 ENCOUNTER FOR SCREENING FOR MALIGNANT NEOPLASM OF COLON: ICD-10-CM

## 2024-05-15 PROCEDURE — 99397 PER PM REEVAL EST PAT 65+ YR: CPT

## 2024-05-15 PROCEDURE — 93000 ELECTROCARDIOGRAM COMPLETE: CPT

## 2024-05-15 RX ORDER — OXYCODONE 10 MG/1
10 TABLET ORAL
Qty: 30 | Refills: 0 | Status: DISCONTINUED | COMMUNITY
Start: 2024-04-19 | End: 2024-05-15

## 2024-05-15 RX ORDER — ROSUVASTATIN CALCIUM 10 MG/1
1 TABLET ORAL
Qty: 0 | Refills: 1 | DISCHARGE
Start: 2024-05-15

## 2024-05-15 RX ORDER — ROSUVASTATIN CALCIUM 10 MG/1
10 TABLET, FILM COATED ORAL DAILY
Qty: 30 | Refills: 1 | Status: ACTIVE | COMMUNITY
Start: 2024-05-15 | End: 1900-01-01

## 2024-05-15 RX ORDER — ROSUVASTATIN CALCIUM 5 MG/1
5 TABLET, FILM COATED ORAL DAILY
Qty: 90 | Refills: 1 | Status: DISCONTINUED | COMMUNITY
Start: 2023-03-30 | End: 2024-05-15

## 2024-05-15 NOTE — HEALTH RISK ASSESSMENT
[Yes] : Yes [2 - 4 times a month (2 pts)] : 2-4 times a month (2 points) [1 or 2 (0 pts)] : 1 or 2 (0 points) [Never (0 pts)] : Never (0 points) [No] : In the past 12 months have you used drugs other than those required for medical reasons? No [0] : 2) Feeling down, depressed, or hopeless: Not at all (0) [PHQ-2 Negative - No further assessment needed] : PHQ-2 Negative - No further assessment needed [Never] : Never [HIV test declined] : HIV test declined [Hepatitis C test declined] : Hepatitis C test declined [None] : None [ColonoscopyDate] : 2014

## 2024-05-15 NOTE — PLAN
[FreeTextEntry1] : 1. Increase Crestor to 10mg daily with f/u Lipid panel and CMP in 6 weeks. 2. Annual Dermatology, ophthalmology, and dental exams. 3. Referral to GI, Dr. Lima for colon cancer screening colonoscopy. 4. Vaccines as scheduled. 5. F/U with Dr. Perez. 6. Mediterranean diet and exercise regimen.  7. F/U in 6 months.

## 2024-05-15 NOTE — PHYSICAL EXAM
[No Acute Distress] : no acute distress [Normal Sclera/Conjunctiva] : normal sclera/conjunctiva [Normal Outer Ear/Nose] : the outer ears and nose were normal in appearance [Normal Oropharynx] : the oropharynx was normal [Normal TMs] : both tympanic membranes were normal [No JVD] : no jugular venous distention [No Lymphadenopathy] : no lymphadenopathy [Supple] : supple [No Respiratory Distress] : no respiratory distress  [No Accessory Muscle Use] : no accessory muscle use [Clear to Auscultation] : lungs were clear to auscultation bilaterally [Normal Rate] : normal rate  [Regular Rhythm] : with a regular rhythm [Normal S1, S2] : normal S1 and S2 [No Edema] : there was no peripheral edema [No Extremity Clubbing/Cyanosis] : no extremity clubbing/cyanosis [Soft] : abdomen soft [Normal Anterior Cervical Nodes] : no anterior cervical lymphadenopathy [No CVA Tenderness] : no CVA  tenderness [No Joint Swelling] : no joint swelling [Grossly Normal Strength/Tone] : grossly normal strength/tone [No Rash] : no rash [No Focal Deficits] : no focal deficits [Normal Gait] : normal gait [Normal Affect] : the affect was normal [Alert and Oriented x3] : oriented to person, place, and time [Normal Insight/Judgement] : insight and judgment were intact

## 2024-05-15 NOTE — HISTORY OF PRESENT ILLNESS
[FreeTextEntry1] : Annual Wellness Exam [de-identified] : 64M w/ PMHx of carotid atherosclerosis, kidney donor (Left 2020), HLD, and ED presents to office for an annual wellness exam.   Pt is status post Flexible bronchoscopy, Right thoracotomy with evacuation of hemothorax, open reduction and internal fixation of three ribs; 7, 8, and 9, with rib plating on 4/12/2024 after sustaining a fall. He is following with Dr. Perez.   The patient reports feeling well, offers no acute complaints. He exercises daily and eats healthy. He is a never smoker, social drinker and he denies illicit drug use.  The patient is UTD on ophthalmology, dermatology and dental exams. He is due for a colonoscopy.   Recent blood work reviewed, , CV risk 10.19%.

## 2024-05-15 NOTE — REVIEW OF SYSTEMS
[Fever] : no fever [Chills] : no chills [Fatigue] : no fatigue [Recent Change In Weight] : ~T no recent weight change [Dysuria] : no dysuria [Hesitancy] : no hesitancy [Hematuria] : no hematuria [Joint Pain] : no joint pain [Joint Stiffness] : no joint stiffness [Muscle Pain] : no muscle pain [Back Pain] : no back pain [Joint Swelling] : no joint swelling [Itching] : no itching [Skin Rash] : no skin rash [Headache] : no headache [Dizziness] : no dizziness [Unsteady Walk] : no ataxia [Negative] : Heme/Lymph

## 2024-05-19 ENCOUNTER — RX RENEWAL (OUTPATIENT)
Age: 64
End: 2024-05-19

## 2024-05-19 RX ORDER — TADALAFIL 5 MG/1
5 TABLET ORAL
Qty: 30 | Refills: 5 | Status: ACTIVE | COMMUNITY
Start: 2023-09-06 | End: 1900-01-01

## 2024-06-06 ENCOUNTER — NON-APPOINTMENT (OUTPATIENT)
Age: 64
End: 2024-06-06

## 2024-06-10 ENCOUNTER — NON-APPOINTMENT (OUTPATIENT)
Age: 64
End: 2024-06-10

## 2024-06-21 ENCOUNTER — OUTPATIENT (OUTPATIENT)
Dept: OUTPATIENT SERVICES | Facility: HOSPITAL | Age: 64
LOS: 1 days | End: 2024-06-21
Payer: COMMERCIAL

## 2024-06-21 ENCOUNTER — APPOINTMENT (OUTPATIENT)
Dept: THORACIC SURGERY | Facility: CLINIC | Age: 64
End: 2024-06-21
Payer: COMMERCIAL

## 2024-06-21 VITALS
HEART RATE: 83 BPM | SYSTOLIC BLOOD PRESSURE: 127 MMHG | RESPIRATION RATE: 16 BRPM | DIASTOLIC BLOOD PRESSURE: 78 MMHG | OXYGEN SATURATION: 99 %

## 2024-06-21 DIAGNOSIS — J93.9 PNEUMOTHORAX, UNSPECIFIED: ICD-10-CM

## 2024-06-21 DIAGNOSIS — S22.5XXA: ICD-10-CM

## 2024-06-21 PROCEDURE — 71046 X-RAY EXAM CHEST 2 VIEWS: CPT

## 2024-06-21 PROCEDURE — 99024 POSTOP FOLLOW-UP VISIT: CPT

## 2024-06-21 PROCEDURE — 71046 X-RAY EXAM CHEST 2 VIEWS: CPT | Mod: 26

## 2024-06-21 NOTE — REASON FOR VISIT
[de-identified] : Flexible bronchoscopy, Right thoracotomy with evacuation of hemothorax, open reduction and internal fixation of three ribs; 7, 8, and 9, with rib plating. [de-identified] : 4/12/2024

## 2024-06-21 NOTE — ASSESSMENT
[FreeTextEntry1] :  Mr. ROLY CARRASCO, 64 year old male, PMHx of HTN presents to the ED c/o mid right back pain after falling on 4x4 plank today. Found to have right posterior rib fractures and a moderate PTX. Overall he is doing well and has major improvement in his pain symptoms. He has returned to work and would like to continue physical therapy.   Now s/p Flexible bronchoscopy, Right thoracotomy with evacuation of hemothorax, open reduction and internal fixation of three ribs; 7, 8, and 9, with rib plating on 4/12/2024. Path of portion of rib (fifth)- Region of medullary hemorrhage and fibrin; consistent with fracture. Normocellular hematopoietic marrow with trilineage maturation. portion of 8th rib hemorrhagic firm bone fragment.    Patient presents today for clinical follow up.  I have reviewed the patient's medical records and diagnostic images at time of this office consultation and have made the following recommendation: 1. Return to care as needed 2. Physical Therapy referral provided       I, Dr. Perez personally performed the evaluation and management (E/M) services for this patient. That E/M includes conducting the initial examination, assessing all conditions, and establishing the plan of care. Today, Ed Drummond NP was here to observe my evaluation and management services for this patient.

## 2024-06-21 NOTE — PHYSICAL EXAM
[Respiration, Rhythm And Depth] : normal respiratory rhythm and effort [Heart Rate And Rhythm] : heart rate was normal and rhythm regular [Clean] : clean [Dry] : dry [Healing Well] : healing well

## 2024-06-22 DIAGNOSIS — J93.9 PNEUMOTHORAX, UNSPECIFIED: ICD-10-CM

## 2024-07-15 ENCOUNTER — RX RENEWAL (OUTPATIENT)
Age: 64
End: 2024-07-15

## 2024-08-05 ENCOUNTER — APPOINTMENT (OUTPATIENT)
Dept: INTERNAL MEDICINE | Facility: CLINIC | Age: 64
End: 2024-08-05

## 2024-08-12 ENCOUNTER — APPOINTMENT (OUTPATIENT)
Age: 64
End: 2024-08-12

## 2024-08-30 ENCOUNTER — APPOINTMENT (OUTPATIENT)
Dept: CARDIOLOGY | Facility: CLINIC | Age: 64
End: 2024-08-30
Payer: COMMERCIAL

## 2024-08-30 ENCOUNTER — NON-APPOINTMENT (OUTPATIENT)
Age: 64
End: 2024-08-30

## 2024-08-30 VITALS
HEIGHT: 73 IN | SYSTOLIC BLOOD PRESSURE: 116 MMHG | BODY MASS INDEX: 29.82 KG/M2 | WEIGHT: 225 LBS | OXYGEN SATURATION: 97 % | DIASTOLIC BLOOD PRESSURE: 68 MMHG | HEART RATE: 77 BPM

## 2024-08-30 DIAGNOSIS — E78.5 HYPERLIPIDEMIA, UNSPECIFIED: ICD-10-CM

## 2024-08-30 DIAGNOSIS — R01.1 CARDIAC MURMUR, UNSPECIFIED: ICD-10-CM

## 2024-08-30 DIAGNOSIS — I48.91 UNSPECIFIED ATRIAL FIBRILLATION: ICD-10-CM

## 2024-08-30 DIAGNOSIS — R00.2 PALPITATIONS: ICD-10-CM

## 2024-08-30 PROCEDURE — 93000 ELECTROCARDIOGRAM COMPLETE: CPT

## 2024-08-30 PROCEDURE — 99204 OFFICE O/P NEW MOD 45 MIN: CPT | Mod: 25

## 2024-08-30 NOTE — PHYSICAL EXAM
[Normal S1, S2] : normal S1, S2 [Clear Lung Fields] : clear lung fields [Good Air Entry] : good air entry [No Respiratory Distress] : no respiratory distress  [Soft] : abdomen soft [Non Tender] : non-tender [Normal Gait] : normal gait [No Edema] : no edema [Normal Speech] : normal speech [Alert and Oriented] : alert and oriented [de-identified] : Appears stated age and well [de-identified] : Extraocular muscles intact [de-identified] : Normocephalic [de-identified] : No JVD [de-identified] : IIVI murmur loudest at cardiac apex [de-identified] : Warm, dry

## 2024-08-30 NOTE — REVIEW OF SYSTEMS
[Negative] : Heme/Lymph [SOB] : no shortness of breath [Dyspnea on exertion] : not dyspnea during exertion [Chest Discomfort] : no chest discomfort [FreeTextEntry5] : 1 episode of palpitations

## 2024-08-30 NOTE — HISTORY OF PRESENT ILLNESS
[FreeTextEntry1] : Augustine Levin is a 64-year-old man with a history of hyperlipidemia, left kidney donor (2020), and flail chest following a fall and requiring thoracic surgery (April 2024), who presents for cardiology consultation because of atrial fibrillation.  In July 2024 he felt unwell with palpitations and his Apple Watch alerted him to the presence of possible atrial fibrillation.  His heart rate was elevated (he is not sure if it was irregular); his ambulatory ECG monitor also indicated the presence of an abnormal pulse.  Symptoms present for several hours before resolving.  No clear exacerbating or alleviating factors. He has no history of heart disease--mentions that his father had been diagnosed with atrial fibrillation.  Mr. Levin is athletic--excellent exercise capacity; previously participated in [employer] competitions.

## 2024-08-30 NOTE — DISCUSSION/SUMMARY
[FreeTextEntry1] :  Probable atrial fibrillation: Recent tachyarrhythmia most likely atrial fibrillation based on description and abnormal findings on Apple Watch and blood pressure monitor; currently in sinus rhythm; recent lab work showed normal thyroid function.  We calculated his [id number] score together--1; defer anticoagulation at this time.    Cardiac murmur: Murmur on cardiac auscultation--I recommend echocardiography and will contact patient by telephone to discuss results after imaging has been completed.  Hypercholesterolemia: Controlled; continue rosuvastatin.

## 2024-09-03 ENCOUNTER — NON-APPOINTMENT (OUTPATIENT)
Age: 64
End: 2024-09-03

## 2024-09-04 ENCOUNTER — INPATIENT (INPATIENT)
Facility: HOSPITAL | Age: 64
LOS: 1 days | Discharge: HOME | DRG: 310 | End: 2024-09-06
Attending: STUDENT IN AN ORGANIZED HEALTH CARE EDUCATION/TRAINING PROGRAM | Admitting: INTERNAL MEDICINE
Payer: COMMERCIAL

## 2024-09-04 VITALS
WEIGHT: 169.98 LBS | OXYGEN SATURATION: 97 % | HEART RATE: 89 BPM | TEMPERATURE: 98 F | HEIGHT: 70 IN | DIASTOLIC BLOOD PRESSURE: 77 MMHG | RESPIRATION RATE: 18 BRPM | SYSTOLIC BLOOD PRESSURE: 101 MMHG

## 2024-09-04 DIAGNOSIS — E78.5 HYPERLIPIDEMIA, UNSPECIFIED: ICD-10-CM

## 2024-09-04 DIAGNOSIS — R01.1 CARDIAC MURMUR, UNSPECIFIED: ICD-10-CM

## 2024-09-04 DIAGNOSIS — I48.91 UNSPECIFIED ATRIAL FIBRILLATION: ICD-10-CM

## 2024-09-04 DIAGNOSIS — Z79.82 LONG TERM (CURRENT) USE OF ASPIRIN: ICD-10-CM

## 2024-09-04 DIAGNOSIS — I48.0 PAROXYSMAL ATRIAL FIBRILLATION: ICD-10-CM

## 2024-09-04 LAB
ALBUMIN SERPL ELPH-MCNC: 3.9 G/DL — SIGNIFICANT CHANGE UP (ref 3.3–5)
ALP SERPL-CCNC: 67 U/L — SIGNIFICANT CHANGE UP (ref 40–120)
ALT FLD-CCNC: 33 U/L — SIGNIFICANT CHANGE UP (ref 12–78)
ANION GAP SERPL CALC-SCNC: 8 MMOL/L — SIGNIFICANT CHANGE UP (ref 5–17)
APTT BLD: 31.6 SEC — SIGNIFICANT CHANGE UP (ref 24.5–35.6)
AST SERPL-CCNC: 27 U/L — SIGNIFICANT CHANGE UP (ref 15–37)
BASOPHILS # BLD AUTO: 0.05 K/UL — SIGNIFICANT CHANGE UP (ref 0–0.2)
BASOPHILS NFR BLD AUTO: 0.8 % — SIGNIFICANT CHANGE UP (ref 0–2)
BILIRUB SERPL-MCNC: 0.7 MG/DL — SIGNIFICANT CHANGE UP (ref 0.2–1.2)
BUN SERPL-MCNC: 17 MG/DL — SIGNIFICANT CHANGE UP (ref 7–23)
CALCIUM SERPL-MCNC: 9.2 MG/DL — SIGNIFICANT CHANGE UP (ref 8.5–10.1)
CHLORIDE SERPL-SCNC: 109 MMOL/L — HIGH (ref 96–108)
CO2 SERPL-SCNC: 25 MMOL/L — SIGNIFICANT CHANGE UP (ref 22–31)
CREAT SERPL-MCNC: 1.26 MG/DL — SIGNIFICANT CHANGE UP (ref 0.5–1.3)
EGFR: 63 ML/MIN/1.73M2 — SIGNIFICANT CHANGE UP
EOSINOPHIL # BLD AUTO: 0.25 K/UL — SIGNIFICANT CHANGE UP (ref 0–0.5)
EOSINOPHIL NFR BLD AUTO: 3.8 % — SIGNIFICANT CHANGE UP (ref 0–6)
GLUCOSE SERPL-MCNC: 108 MG/DL — HIGH (ref 70–99)
HCT VFR BLD CALC: 39.5 % — SIGNIFICANT CHANGE UP (ref 39–50)
HGB BLD-MCNC: 13.9 G/DL — SIGNIFICANT CHANGE UP (ref 13–17)
IMM GRANULOCYTES NFR BLD AUTO: 0.2 % — SIGNIFICANT CHANGE UP (ref 0–0.9)
INR BLD: 0.96 RATIO — SIGNIFICANT CHANGE UP (ref 0.85–1.18)
LYMPHOCYTES # BLD AUTO: 1.59 K/UL — SIGNIFICANT CHANGE UP (ref 1–3.3)
LYMPHOCYTES # BLD AUTO: 24.1 % — SIGNIFICANT CHANGE UP (ref 13–44)
MAGNESIUM SERPL-MCNC: 2.4 MG/DL — SIGNIFICANT CHANGE UP (ref 1.6–2.6)
MCHC RBC-ENTMCNC: 33.9 PG — SIGNIFICANT CHANGE UP (ref 27–34)
MCHC RBC-ENTMCNC: 35.2 GM/DL — SIGNIFICANT CHANGE UP (ref 32–36)
MCV RBC AUTO: 96.3 FL — SIGNIFICANT CHANGE UP (ref 80–100)
MONOCYTES # BLD AUTO: 0.51 K/UL — SIGNIFICANT CHANGE UP (ref 0–0.9)
MONOCYTES NFR BLD AUTO: 7.7 % — SIGNIFICANT CHANGE UP (ref 2–14)
NEUTROPHILS # BLD AUTO: 4.18 K/UL — SIGNIFICANT CHANGE UP (ref 1.8–7.4)
NEUTROPHILS NFR BLD AUTO: 63.4 % — SIGNIFICANT CHANGE UP (ref 43–77)
NT-PROBNP SERPL-SCNC: 147 PG/ML — HIGH (ref 0–125)
PLATELET # BLD AUTO: 183 K/UL — SIGNIFICANT CHANGE UP (ref 150–400)
POTASSIUM SERPL-MCNC: 3.6 MMOL/L — SIGNIFICANT CHANGE UP (ref 3.5–5.3)
POTASSIUM SERPL-SCNC: 3.6 MMOL/L — SIGNIFICANT CHANGE UP (ref 3.5–5.3)
PROT SERPL-MCNC: 7 GM/DL — SIGNIFICANT CHANGE UP (ref 6–8.3)
PROTHROM AB SERPL-ACNC: 10.9 SEC — SIGNIFICANT CHANGE UP (ref 9.5–13)
RBC # BLD: 4.1 M/UL — LOW (ref 4.2–5.8)
RBC # FLD: 13.2 % — SIGNIFICANT CHANGE UP (ref 10.3–14.5)
SODIUM SERPL-SCNC: 142 MMOL/L — SIGNIFICANT CHANGE UP (ref 135–145)
TROPONIN I, HIGH SENSITIVITY RESULT: 30.46 NG/L — SIGNIFICANT CHANGE UP
WBC # BLD: 6.59 K/UL — SIGNIFICANT CHANGE UP (ref 3.8–10.5)
WBC # FLD AUTO: 6.59 K/UL — SIGNIFICANT CHANGE UP (ref 3.8–10.5)

## 2024-09-04 PROCEDURE — 85027 COMPLETE CBC AUTOMATED: CPT

## 2024-09-04 PROCEDURE — 84443 ASSAY THYROID STIM HORMONE: CPT

## 2024-09-04 PROCEDURE — 33208 INSRT HEART PM ATRIAL & VENT: CPT

## 2024-09-04 PROCEDURE — 36415 COLL VENOUS BLD VENIPUNCTURE: CPT

## 2024-09-04 PROCEDURE — 93010 ELECTROCARDIOGRAM REPORT: CPT

## 2024-09-04 PROCEDURE — 71045 X-RAY EXAM CHEST 1 VIEW: CPT | Mod: 26

## 2024-09-04 PROCEDURE — 80048 BASIC METABOLIC PNL TOTAL CA: CPT

## 2024-09-04 PROCEDURE — 93312 ECHO TRANSESOPHAGEAL: CPT

## 2024-09-04 PROCEDURE — 83036 HEMOGLOBIN GLYCOSYLATED A1C: CPT

## 2024-09-04 PROCEDURE — 80053 COMPREHEN METABOLIC PANEL: CPT

## 2024-09-04 PROCEDURE — 93325 DOPPLER ECHO COLOR FLOW MAPG: CPT

## 2024-09-04 PROCEDURE — 93306 TTE W/DOPPLER COMPLETE: CPT

## 2024-09-04 PROCEDURE — 93320 DOPPLER ECHO COMPLETE: CPT

## 2024-09-04 PROCEDURE — 80061 LIPID PANEL: CPT

## 2024-09-04 PROCEDURE — 92960 CARDIOVERSION ELECTRIC EXT: CPT

## 2024-09-04 PROCEDURE — 83735 ASSAY OF MAGNESIUM: CPT

## 2024-09-04 PROCEDURE — 93005 ELECTROCARDIOGRAM TRACING: CPT

## 2024-09-04 PROCEDURE — 85025 COMPLETE CBC W/AUTO DIFF WBC: CPT

## 2024-09-04 PROCEDURE — 84100 ASSAY OF PHOSPHORUS: CPT

## 2024-09-04 PROCEDURE — 84484 ASSAY OF TROPONIN QUANT: CPT

## 2024-09-04 PROCEDURE — 99291 CRITICAL CARE FIRST HOUR: CPT

## 2024-09-04 RX ORDER — APIXABAN 5 MG/1
5 TABLET, FILM COATED ORAL ONCE
Refills: 0 | Status: COMPLETED | OUTPATIENT
Start: 2024-09-04 | End: 2024-09-04

## 2024-09-04 RX ORDER — SODIUM CHLORIDE 9 MG/ML
500 INJECTION INTRAMUSCULAR; INTRAVENOUS; SUBCUTANEOUS ONCE
Refills: 0 | Status: COMPLETED | OUTPATIENT
Start: 2024-09-04 | End: 2024-09-04

## 2024-09-04 RX ORDER — DILTIAZEM HYDROCHLORIDE 5 MG/ML
60 INJECTION INTRAVENOUS ONCE
Refills: 0 | Status: COMPLETED | OUTPATIENT
Start: 2024-09-04 | End: 2024-09-04

## 2024-09-04 RX ORDER — DILTIAZEM HYDROCHLORIDE 5 MG/ML
20 INJECTION INTRAVENOUS ONCE
Refills: 0 | Status: COMPLETED | OUTPATIENT
Start: 2024-09-04 | End: 2024-09-04

## 2024-09-04 RX ADMIN — SODIUM CHLORIDE 333.33 MILLILITER(S): 9 INJECTION INTRAMUSCULAR; INTRAVENOUS; SUBCUTANEOUS at 20:25

## 2024-09-04 RX ADMIN — DILTIAZEM HYDROCHLORIDE 20 MILLIGRAM(S): 5 INJECTION INTRAVENOUS at 21:02

## 2024-09-04 RX ADMIN — DILTIAZEM HYDROCHLORIDE 60 MILLIGRAM(S): 5 INJECTION INTRAVENOUS at 21:47

## 2024-09-04 NOTE — ED ADULT TRIAGE NOTE - CHIEF COMPLAINT QUOTE
Patient states around 5pm he beg having palpitations and irregular heart beat. Went to urgent care and noted to be in A-Fib with a rate of 170. States this happened once prior and was told he was in A-Fib, resolved on own, not placed on any medications at that time.

## 2024-09-04 NOTE — ED PROVIDER NOTE - PHYSICAL EXAMINATION
Gen: Well nourished older WM, alert, no respiratory discomfort, not acutely ill   Head: NC/AT  Eyes: PERRL, EOMI  ENT: oropharynx clear, mucous membranes slightly dry  Card: tachycardic and irregularly irregular rhythm, normal radial pulse  Resp: Breath sounds clear bilaterally, respirations normal  Abd: soft, non-tender + bowel sounds, no rebound, guarding or mass   Msk: MAEx4 without focal deformities, tenderness or swelling  Skin: no tactile warmth, no rash  Neuro: Alert and oriented, no focal deficits, no motor or sensory deficits Gen: Well nourished older WM, alert, no respiratory discomfort, not acutely ill   Head: NC/AT  Eyes: PERRL, EOMI  ENT: oropharynx clear, mucous membranes slightly dry  Card: tachycardic and irregularly irregular rhythm, normal radial pulse  Resp: Breath sounds clear bilaterally, respirations normal  Abd: soft, non-tender + bowel sounds, no rebound, guarding or mass   Msk: MAEx4 without focal deformities, tenderness or swelling  Skin: no tactile warmth, no rash, no diaphoresis  Neuro: Alert and oriented, no focal deficits, no motor or sensory deficits, CNs intact, normal speech

## 2024-09-04 NOTE — ED PROVIDER NOTE - OBJECTIVE STATEMENT
63 y/o M with PMHx of palpitations, kidney donor, [place] presents to the ambulatory to the ED from  c/o palpitations.  EKG + rapid afib. Acute onset of rapid palpitations 5pm while grocery shopping, pushing a shopping cart, with an initial onset of lightheadedness and near syncope which has since resolved. No chest pain, SOB, LOC, F/C, N/V. Similar episode 5 weeks ago, brief duration and [id number]. Cardiology office follow up last week Dr Chua who advised pt to go to ED expediently if sx recur. PCP Leda. 65 y/o M with PMHx of palpitations, kidney donor, [place] presents ambulatory to the ED from Urgent Care referral c/o palpitations. UC EKG + rapid A-Fib. Acute onset of rapid palpitations 5pm while grocery shopping, pushing a shopping cart, with an initial onset of lightheadedness and near-syncope which self-resolved. No chest pain, SOB, LOC, F/C, N/V. Similar episode 5 weeks ago, brief duration and self-resolved; no medical attention at that time. Cardiology office follow up last week Dr Chua: advised pt to go to ED expediently if sx recur.    PCP Leda.

## 2024-09-04 NOTE — ED ADULT NURSE NOTE - OBJECTIVE STATEMENT
65 y/o male presents to the ED c/o palpitations and dizziness. Pt states he was shopping at around 5 PM when he felt dizzy and thought he was going to faint.   Pt came from urgent care where he was in a fib in the 170s. Pt denies any weakness, numbness, N/V/D, dizziness, chest pain, SOB at this time. Pt PMHx a fib about 5 weeks ago that resolved on its own (not on any blood thinners), kidney donor. No other complaints, safety maintained. EKG complete, , R 16, /95, O2 98% at this time.

## 2024-09-04 NOTE — ED PROVIDER NOTE - CLINICAL SUMMARY MEDICAL DECISION MAKING FREE TEXT BOX
65 y/o M with PMHx of palpitations, kidney donor, [place] presents to the ambulatory to the ED from  c/o palpitations.  EKG + rapid afib.   Plan: EKG, CXR, cardiac labs, IVF, IV Cardizem slow push, monitor, observe, reassess. 65 y/o M with PMHx of palpitations, kidney donor, [place] presents to the ambulatory to the ED from  c/o palpitations.  EKG + rapid afib.   Plan: EKG, CXR, cardiac labs, IVF, IV Cardizem slow push, monitor, observe, reassess.    21;17, C MD Clement:  Labs results unremarkable.  Appropriate clinical response to IV Cardizem, HR currently in mid 80s, still in A-Fib, BP stable.  SANDHYA-VASC2 score = 1.   Paging Dr. Chua or covering cardiologist to discuss further management and possible need for AC medication. 65 y/o M with PMHx of palpitations, kidney donor, [place] presents to the ambulatory to the ED from  c/o palpitations.  EKG + rapid afib.   Plan: EKG, CXR, cardiac labs, IVF, IV Cardizem slow push, monitor, observe, reassess.    21;17, C MD Clement:  Labs results unremarkable.  Appropriate clinical response to IV Cardizem, HR currently in mid 80s, still in A-Fib, BP stable.  SANDHYA-VASC2 score = 1.   Paging Dr. Chua or covering cardiologist to discuss further management and possible need for AC medication.    23;00, C MD Clement:  Case discussed with Dr. Montiel/Cardiology covering for Dr. Chua including  labs results, rate control s/p Cardizem and EKG.  He advises telemetry admission for further monitoring and management including AC medication and cardiac meds for rate control.  Patient still in A-Fib with rate fluctuating between 85 and 110, BP stable, no chest pain near syncope nor SOB.  Case discussed with Dr. Sanchez  and Telemetry admit accepted.  Ordering rpt Troponin. 65 y/o M with PMHx of palpitations, kidney donor, [place] presents ambulatory to the ED from Urgent Care c/o palpitations. UC EKG + rapid A-Fib.   Plan: EKG, CXR, cardiac labs, IVF, IV Cardizem slow push, monitor, observe, reassess.    21;17, C MD Clement:  Labs results unremarkable.  Appropriate clinical response to IV Cardizem, HR currently in mid 80s, still in A-Fib, BP stable.  SANDHYA-VASC2 score = 1.   Paging Dr. Chua or covering cardiologist to discuss further management and possible need for AC medication.    23:00, C MD Clement:  Case discussed with Dr. Montiel/Cardiology covering for Dr. Chua including  labs results, + rate control s/p Cardizem and EKG.  He advises Telemetry admission for further monitoring and management including AC medication and cardiac meds for rate control.  Patient still in A-Fib with rate fluctuating between 85 and 110, BP stable, no chest pain near syncope nor SOB.  Case discussed with Dr. Sanchez  and Telemetry admit accepted.  Ordering rpt Troponin.

## 2024-09-04 NOTE — ED ADULT NURSE REASSESSMENT NOTE - NS ED NURSE REASSESS COMMENT FT1
Pt lying in stretcher with unlabored respirations. Pt denies dizziness, chest pain, SOB, nausea. Pt requesting food at this time, MD Vaughan made aware. Pt has no other complaints at this time, safety and comfort maintained.

## 2024-09-04 NOTE — ED PROVIDER NOTE - STUDIES
EKG - see results section for interpretation EKG - see results section for interpretation/Xray Image(s) - see wet read section for interpretation/Rhythm Strip

## 2024-09-05 ENCOUNTER — RESULT REVIEW (OUTPATIENT)
Age: 64
End: 2024-09-05

## 2024-09-05 DIAGNOSIS — I48.91 UNSPECIFIED ATRIAL FIBRILLATION: ICD-10-CM

## 2024-09-05 DIAGNOSIS — E78.5 HYPERLIPIDEMIA, UNSPECIFIED: ICD-10-CM

## 2024-09-05 DIAGNOSIS — R01.1 CARDIAC MURMUR, UNSPECIFIED: ICD-10-CM

## 2024-09-05 LAB
A1C WITH ESTIMATED AVERAGE GLUCOSE RESULT: 5 % — SIGNIFICANT CHANGE UP (ref 4–5.6)
ALBUMIN SERPL ELPH-MCNC: 3.5 G/DL — SIGNIFICANT CHANGE UP (ref 3.3–5)
ALP SERPL-CCNC: 65 U/L — SIGNIFICANT CHANGE UP (ref 40–120)
ALT FLD-CCNC: 31 U/L — SIGNIFICANT CHANGE UP (ref 12–78)
ANION GAP SERPL CALC-SCNC: 6 MMOL/L — SIGNIFICANT CHANGE UP (ref 5–17)
AST SERPL-CCNC: 26 U/L — SIGNIFICANT CHANGE UP (ref 15–37)
BASOPHILS # BLD AUTO: 0.03 K/UL — SIGNIFICANT CHANGE UP (ref 0–0.2)
BASOPHILS NFR BLD AUTO: 0.6 % — SIGNIFICANT CHANGE UP (ref 0–2)
BILIRUB SERPL-MCNC: 0.6 MG/DL — SIGNIFICANT CHANGE UP (ref 0.2–1.2)
BUN SERPL-MCNC: 18 MG/DL — SIGNIFICANT CHANGE UP (ref 7–23)
CALCIUM SERPL-MCNC: 8.3 MG/DL — LOW (ref 8.5–10.1)
CHLORIDE SERPL-SCNC: 108 MMOL/L — SIGNIFICANT CHANGE UP (ref 96–108)
CHOLEST SERPL-MCNC: 171 MG/DL — SIGNIFICANT CHANGE UP
CO2 SERPL-SCNC: 24 MMOL/L — SIGNIFICANT CHANGE UP (ref 22–31)
CREAT SERPL-MCNC: 1.03 MG/DL — SIGNIFICANT CHANGE UP (ref 0.5–1.3)
EGFR: 81 ML/MIN/1.73M2 — SIGNIFICANT CHANGE UP
EOSINOPHIL # BLD AUTO: 0.32 K/UL — SIGNIFICANT CHANGE UP (ref 0–0.5)
EOSINOPHIL NFR BLD AUTO: 6.5 % — HIGH (ref 0–6)
ESTIMATED AVERAGE GLUCOSE: 97 MG/DL — SIGNIFICANT CHANGE UP (ref 68–114)
GLUCOSE SERPL-MCNC: 96 MG/DL — SIGNIFICANT CHANGE UP (ref 70–99)
HCT VFR BLD CALC: 37.7 % — LOW (ref 39–50)
HDLC SERPL-MCNC: 59 MG/DL — SIGNIFICANT CHANGE UP
HGB BLD-MCNC: 12.9 G/DL — LOW (ref 13–17)
IMM GRANULOCYTES NFR BLD AUTO: 0.2 % — SIGNIFICANT CHANGE UP (ref 0–0.9)
LIPID PNL WITH DIRECT LDL SERPL: 97 MG/DL — SIGNIFICANT CHANGE UP
LYMPHOCYTES # BLD AUTO: 1.31 K/UL — SIGNIFICANT CHANGE UP (ref 1–3.3)
LYMPHOCYTES # BLD AUTO: 26.7 % — SIGNIFICANT CHANGE UP (ref 13–44)
MAGNESIUM SERPL-MCNC: 2.2 MG/DL — SIGNIFICANT CHANGE UP (ref 1.6–2.6)
MCHC RBC-ENTMCNC: 33.5 PG — SIGNIFICANT CHANGE UP (ref 27–34)
MCHC RBC-ENTMCNC: 34.2 GM/DL — SIGNIFICANT CHANGE UP (ref 32–36)
MCV RBC AUTO: 97.9 FL — SIGNIFICANT CHANGE UP (ref 80–100)
MONOCYTES # BLD AUTO: 0.45 K/UL — SIGNIFICANT CHANGE UP (ref 0–0.9)
MONOCYTES NFR BLD AUTO: 9.2 % — SIGNIFICANT CHANGE UP (ref 2–14)
NEUTROPHILS # BLD AUTO: 2.78 K/UL — SIGNIFICANT CHANGE UP (ref 1.8–7.4)
NEUTROPHILS NFR BLD AUTO: 56.8 % — SIGNIFICANT CHANGE UP (ref 43–77)
NON HDL CHOLESTEROL: 113 MG/DL — SIGNIFICANT CHANGE UP
PHOSPHATE SERPL-MCNC: 3 MG/DL — SIGNIFICANT CHANGE UP (ref 2.5–4.5)
PLATELET # BLD AUTO: 170 K/UL — SIGNIFICANT CHANGE UP (ref 150–400)
POTASSIUM SERPL-MCNC: 3.8 MMOL/L — SIGNIFICANT CHANGE UP (ref 3.5–5.3)
POTASSIUM SERPL-SCNC: 3.8 MMOL/L — SIGNIFICANT CHANGE UP (ref 3.5–5.3)
PROT SERPL-MCNC: 6.3 GM/DL — SIGNIFICANT CHANGE UP (ref 6–8.3)
RBC # BLD: 3.85 M/UL — LOW (ref 4.2–5.8)
RBC # FLD: 13.2 % — SIGNIFICANT CHANGE UP (ref 10.3–14.5)
SODIUM SERPL-SCNC: 138 MMOL/L — SIGNIFICANT CHANGE UP (ref 135–145)
TRIGL SERPL-MCNC: 85 MG/DL — SIGNIFICANT CHANGE UP
TROPONIN I, HIGH SENSITIVITY RESULT: 40.69 NG/L — SIGNIFICANT CHANGE UP
TSH SERPL-MCNC: 2.4 UU/ML — SIGNIFICANT CHANGE UP (ref 0.34–4.82)
WBC # BLD: 4.9 K/UL — SIGNIFICANT CHANGE UP (ref 3.8–10.5)
WBC # FLD AUTO: 4.9 K/UL — SIGNIFICANT CHANGE UP (ref 3.8–10.5)

## 2024-09-05 PROCEDURE — 93306 TTE W/DOPPLER COMPLETE: CPT | Mod: 26

## 2024-09-05 PROCEDURE — 99222 1ST HOSP IP/OBS MODERATE 55: CPT

## 2024-09-05 PROCEDURE — 99223 1ST HOSP IP/OBS HIGH 75: CPT

## 2024-09-05 RX ORDER — ONDANSETRON 2 MG/ML
4 INJECTION, SOLUTION INTRAMUSCULAR; INTRAVENOUS EVERY 8 HOURS
Refills: 0 | Status: DISCONTINUED | OUTPATIENT
Start: 2024-09-05 | End: 2024-09-06

## 2024-09-05 RX ORDER — METOPROLOL TARTRATE 100 MG/1
25 TABLET ORAL THREE TIMES A DAY
Refills: 0 | Status: DISCONTINUED | OUTPATIENT
Start: 2024-09-05 | End: 2024-09-05

## 2024-09-05 RX ORDER — DILTIAZEM HYDROCHLORIDE 5 MG/ML
10 INJECTION INTRAVENOUS
Qty: 125 | Refills: 0 | Status: DISCONTINUED | OUTPATIENT
Start: 2024-09-05 | End: 2024-09-06

## 2024-09-05 RX ORDER — ACETAMINOPHEN 325 MG/1
650 TABLET ORAL EVERY 6 HOURS
Refills: 0 | Status: DISCONTINUED | OUTPATIENT
Start: 2024-09-05 | End: 2024-09-06

## 2024-09-05 RX ORDER — HEPARIN SODIUM,BOVINE 1000/ML
5000 VIAL (ML) INJECTION EVERY 8 HOURS
Refills: 0 | Status: DISCONTINUED | OUTPATIENT
Start: 2024-09-05 | End: 2024-09-05

## 2024-09-05 RX ORDER — DILTIAZEM HYDROCHLORIDE 5 MG/ML
5 INJECTION INTRAVENOUS
Qty: 125 | Refills: 0 | Status: DISCONTINUED | OUTPATIENT
Start: 2024-09-05 | End: 2024-09-05

## 2024-09-05 RX ORDER — SENNA 187 MG
2 TABLET ORAL AT BEDTIME
Refills: 0 | Status: DISCONTINUED | OUTPATIENT
Start: 2024-09-05 | End: 2024-09-06

## 2024-09-05 RX ORDER — MAGNESIUM, ALUMINUM HYDROXIDE 200-225/5
30 SUSPENSION, ORAL (FINAL DOSE FORM) ORAL EVERY 4 HOURS
Refills: 0 | Status: DISCONTINUED | OUTPATIENT
Start: 2024-09-05 | End: 2024-09-06

## 2024-09-05 RX ORDER — ASPIRIN 81 MG
81 TABLET, DELAYED RELEASE (ENTERIC COATED) ORAL DAILY
Refills: 0 | Status: DISCONTINUED | OUTPATIENT
Start: 2024-09-05 | End: 2024-09-06

## 2024-09-05 RX ORDER — APIXABAN 5 MG/1
5 TABLET, FILM COATED ORAL EVERY 12 HOURS
Refills: 0 | Status: DISCONTINUED | OUTPATIENT
Start: 2024-09-05 | End: 2024-09-06

## 2024-09-05 RX ADMIN — APIXABAN 5 MILLIGRAM(S): 5 TABLET, FILM COATED ORAL at 20:56

## 2024-09-05 RX ADMIN — APIXABAN 5 MILLIGRAM(S): 5 TABLET, FILM COATED ORAL at 12:14

## 2024-09-05 RX ADMIN — Medication 81 MILLIGRAM(S): at 12:15

## 2024-09-05 RX ADMIN — APIXABAN 5 MILLIGRAM(S): 5 TABLET, FILM COATED ORAL at 00:26

## 2024-09-05 RX ADMIN — Medication 2 TABLET(S): at 20:56

## 2024-09-05 RX ADMIN — Medication 100 MILLILITER(S): at 02:16

## 2024-09-05 RX ADMIN — Medication 40 MILLIGRAM(S): at 20:56

## 2024-09-05 RX ADMIN — METOPROLOL TARTRATE 25 MILLIGRAM(S): 100 TABLET ORAL at 05:59

## 2024-09-05 RX ADMIN — Medication 5000 UNIT(S): at 05:59

## 2024-09-05 RX ADMIN — DILTIAZEM HYDROCHLORIDE 5 MG/HR: 5 INJECTION INTRAVENOUS at 12:14

## 2024-09-05 RX ADMIN — DILTIAZEM HYDROCHLORIDE 10 MG/HR: 5 INJECTION INTRAVENOUS at 14:30

## 2024-09-05 NOTE — CONSULT NOTE ADULT - PROBLEM SELECTOR RECOMMENDATION 9
Newly diagnosed paroxsymal atrial fibrillation.  Rate has improved with metoprolol.  CHADS-VASC = 1 and he was reluctant to be anticoagulated during recent outpatient visit with me.  I think he will consent to short-term anticoagulation and would start Eliquis after TTE is completed.    I discussed case with EP service and asked them to see patient -- in absence of significant structural abnormality, he may be a good candidate for ablation; we discussed AAD but he would prefer to avoid Rx.

## 2024-09-05 NOTE — PATIENT PROFILE ADULT - FALL HARM RISK - HARM RISK INTERVENTIONS

## 2024-09-05 NOTE — H&P ADULT - HISTORY OF PRESENT ILLNESS
63 y/o M with PMHx of palpitations, kidney donor, [place] presents to the ambulatory to the ED from  c/o palpitations.  EKG + rapid afib. Acute onset of rapid palpitations 5pm while grocery shopping, pushing a shopping cart, with an initial onset of lightheadedness and near syncope which has since resolved. No chest pain, SOB, LOC, F/C, N/V. Similar episode 5 weeks ago, brief duration and [id number]. Cardiology office follow up last week Dr Chua who advised pt to go to ED expediently if sx recur. PCP Leda. 65 y/o M with PMHx of palpitations, kidney donor, [place] presents to the ambulatory to the ED from  c/o palpitations. Patient reports being in good health overall. Reports palpitations for around 5 days. Went to  and ECG showed Afib with RVR, referred to ED for further management. Has seen cardiology recently for palpitations, but at the time had normal ECG. In ED pts HR in 160s, ECG with afib w/ RVR, and received cadizem and toprol. HR normalized, still in afib on tele. Admitted to  for cardiology eval.

## 2024-09-05 NOTE — CONSULT NOTE ADULT - CONSULT REQUESTED BY NAME
Dr. Chua
Pt a/ox self. Pt has been Anxious and Cooperative. Preoccupied with bowels, but has been redirectable. When in BR, very concerned that there are women in the other room, easily reassured. Compliant with VS and meds. No PRNs. Denies SI, HI, AVH  Contracts for safety. Visible on unit, attempts to engage with peers, but has difficulty d/t garbled speech. Appetite: good  Propels self in w/c both cont and incont, lets staff know when he needs to be toileted or changed. Using rail in BR, is able to transfer to and from toilet to w/c with assist x1, pt doing majority of work. Requires moderate assistance with ADLs.      BP (!) 111/93   Pulse 52   Temp 98.5 °F (36.9 °C) (Temporal)   Resp 18   Ht 5' 8\" (1.727 m)   Wt 180 lb 3.2 oz (81.7 kg)   SpO2 98%   BMI 27.40 kg/m²     Problem: Chronic Conditions and Co-morbidities  Goal: Patient's chronic conditions and co-morbidity symptoms are monitored and maintained or improved  Outcome: Progressing     Problem: ABCDS Injury Assessment  Goal: Absence of physical injury  Outcome: Progressing     Problem: Safety - Adult  Goal: Free from fall injury  Outcome: Progressing     Problem: Pain  Goal: Verbalizes/displays adequate comfort level or baseline comfort level  Outcome: Progressing
Dr. Chew

## 2024-09-05 NOTE — H&P ADULT - PROBLEM SELECTOR PLAN 1
new onset  start aspirin, atorvastatin 40, metoprolol 20 TID  Monitor on tele  Cardio consult. new onset  start aspirin, atorvastatin 40, metoprolol 20 TID  CHADVASC 1-2, will defer AC to cardiology.   ECHO in am.   Monitor on tele  Cardio consult.

## 2024-09-05 NOTE — H&P ADULT - NSHPPHYSICALEXAM_GEN_ALL_CORE
T(C): 36.4 (09-04-24 @ 19:34), Max: 36.4 (09-04-24 @ 19:34)  HR: 93 (09-04-24 @ 21:10) (89 - 164)  BP: 107/86 (09-04-24 @ 21:10) (94/75 - 127/95)  RR: 18 (09-04-24 @ 21:10) (16 - 18)  SpO2: 97% (09-04-24 @ 21:10) (97% - 99%)    General: non-toxic  HEENT: non-traumatic, perrla, eomi  Cardio: s1s2 regular rate and rhythm  Lungs: comfortable breathing, clear to auscultation  Abdomen: Soft, non-tender, non-distended  Neuro: AOx4  Ext: Pulses +2 T(C): 36.4 (09-04-24 @ 19:34), Max: 36.4 (09-04-24 @ 19:34)  HR: 93 (09-04-24 @ 21:10) (89 - 164)  BP: 107/86 (09-04-24 @ 21:10) (94/75 - 127/95)  RR: 18 (09-04-24 @ 21:10) (16 - 18)  SpO2: 97% (09-04-24 @ 21:10) (97% - 99%)    General: non-toxic  HEENT: non-traumatic, perrla, eomi  Cardio: s1s2 irregularly irregular  Lungs: comfortable breathing, clear to auscultation  Abdomen: Soft, non-tender, non-distended  Neuro: AOx4  Ext: Pulses +2

## 2024-09-05 NOTE — ED ADULT NURSE REASSESSMENT NOTE - NS ED NURSE REASSESS COMMENT FT1
Patient resting in ED stretcher. Pt A+Ox4, NAD. Afib on monitor; other VSS. Patient profile completed. IVF infusing. Denies pain, sob, N/V. MD Shafique at bedside to admit. Patient comfort measures provided. Awaiting further orders and bed assignment. Care continues as ordered. Call bell in hand. Fall and safety precautions maintained.

## 2024-09-05 NOTE — SBIRT NOTE ADULT - NSSBIRTALCPOSREINDET_GEN_A_CORE
Reinforced importance of adhering to guidelines provided for healthy ETOH consumption. Pt verbalizes understanding

## 2024-09-05 NOTE — CONSULT NOTE ADULT - SUBJECTIVE AND OBJECTIVE BOX
CHIEF COMPLAINT: Patient is a 64y old  Male who presents with a chief complaint of     HPI:  63 y/o man with a history of suspected 1st AF episode in July 2024 (did not seek medical attention; spontaneously resolved), HLD, and kidney donor, who presented to the ER with AF + RVR after seeking care at a local urgent care center for palpitations.  He describes the abrupt onset of severely symptomatic palpitations while shopping -- felt lightheaded and needed to sit down.  No clear exacerbating or alleviating factors.  I met him last week in outpatient cardiology consultation -- I ordered an echo because of a heart murmur; imaging has not yet been performed.    PAST MEDICAL & SURGICAL HISTORY:  HLD (hyperlipidemia)  Suspected AF  Kidney donor    SOCIAL HISTORY:   Smoking: Nonsmoker  Marital Status:     FAMILY HISTORY: Father (AF)    MEDICATIONS  (STANDING):  aspirin enteric coated 81 milliGRAM(s) Oral daily  atorvastatin 40 milliGRAM(s) Oral at bedtime  heparin   Injectable 5000 Unit(s) SubCutaneous every 8 hours  lactated ringers. 1000 milliLiter(s) (100 mL/Hr) IV Continuous <Continuous>  metoprolol tartrate 25 milliGRAM(s) Oral three times a day  senna 2 Tablet(s) Oral at bedtime    MEDICATIONS  (PRN):  acetaminophen     Tablet .. 650 milliGRAM(s) Oral every 6 hours PRN Temp greater or equal to 38C (100.4F), Mild Pain (1 - 3)  aluminum hydroxide/magnesium hydroxide/simethicone Suspension 30 milliLiter(s) Oral every 4 hours PRN Dyspepsia  melatonin 3 milliGRAM(s) Oral at bedtime PRN Insomnia  ondansetron Injectable 4 milliGRAM(s) IV Push every 8 hours PRN Nausea and/or Vomiting    Allergies:  No Known Allergies    REVIEW OF SYSTEMS:  CONSTITUTIONAL: No fevers or chills  Eyes: No visual changes  NECK: No pain or stiffness  RESPIRATORY: No shortness of breath  CARDIOVASCULAR: +  palpitations  GASTROINTESTINAL: No abdominal pain. No nausea, vomiting, or hematemesis; No diarrhea or constipation. No melena or hematochezia.  GENITOURINARY: No dysuria, frequency or hematuria  NEUROLOGICAL: + dizziness  SKIN: No itching or rash  All other review of systems is negative unless indicated above    VITAL SIGNS:   Vital Signs Last 24 Hrs  T(C): 36.4 (05 Sep 2024 05:22), Max: 37.1 (05 Sep 2024 04:56)  T(F): 97.5 (05 Sep 2024 05:22), Max: 98.7 (05 Sep 2024 04:56)  HR: 80 (05 Sep 2024 05:22) (80 - 164)  BP: 132/97 (05 Sep 2024 05:22) (94/75 - 132/97)  BP(mean): 95 (04 Sep 2024 21:10) (83 - 107)  RR: 18 (05 Sep 2024 05:22) (16 - 18)  SpO2: 100% (05 Sep 2024 05:22) (97% - 100%)  Patient On (Oxygen Delivery Method): room air    PHYSICAL EXAM:  Constitutional: NAD, awake and alert  HEENT:  EOMI,  Pupils round, No oral cyanosis.  Pulmonary: Non-labored, breath sounds are clear bilaterally, No wheezing, rales or rhonchi  Cardiovascular: S1 and S2, irregular rhythm, normal rate (at rest), murmur present  Gastrointestinal: Bowel Sounds present, soft, nontender.   Lymph: No peripheral edema. No cervical lymphadenopathy.  Neurological: Alert, no focal deficits  Skin: No rashes.  Psych:  Mood & affect appropriate    LABS:                    12.9   4.90  )-----------( 170      ( 05 Sep 2024 06:33 )             37.7           138    |  108    |  18     ----------------------------<  96     3.8     |  24     |  1.03     Ca    8.3        05 Sep 2024 06:33  Ca    9.2        04 Sep 2024 19:55  Phos  3.0       05 Sep 2024 06:33  Mg     2.2       05 Sep 2024 06:33  Mg     2.4       04 Sep 2024 19:55    TPro  6.3    /  Alb  3.5    /  TBili  0.6    /  DBili  x      /  AST  26     /  ALT  31     /  AlkPhos  65     05 Sep 2024 06:33  TPro  7.0    /  Alb  3.9    /  TBili  0.7    /  DBili  x      /  AST  27     /  ALT  33     /  AlkPhos  67     04 Sep 2024 19:55    PT/INR - ( 04 Sep 2024 19:55 )   PT: 10.9 sec;   INR: 0.96 ratio      PTT - ( 04 Sep 2024 19:55 )  PTT:31.6 sec    TSH: 2.40    Tele: [facility]

## 2024-09-05 NOTE — PATIENT PROFILE ADULT - FUNCTIONAL ASSESSMENT - BASIC MOBILITY 6.
4-calculated by average/Not able to assess (calculate score using [facility] averaging method)

## 2024-09-05 NOTE — H&P ADULT - NSHPLABSRESULTS_GEN_ALL_CORE
LABS:  cret                        13.9   6.59  )-----------( 183      ( 04 Sep 2024 19:55 )             39.5     09-04    142  |  109<H>  |  17  ----------------------------<  108<H>  3.6   |  25  |  1.26    Ca    9.2      04 Sep 2024 19:55  Mg     2.4     09-04    TPro  7.0  /  Alb  3.9  /  TBili  0.7  /  DBili  x   /  AST  27  /  ALT  33  /  AlkPhos  67  09-04    PT/INR - ( 04 Sep 2024 19:55 )   PT: 10.9 sec;   INR: 0.96 ratio         PTT - ( 04 Sep 2024 19:55 )  PTT:31.6 sec

## 2024-09-05 NOTE — CONSULT NOTE ADULT - SUBJECTIVE AND OBJECTIVE BOX
HPI:  63 y/o M with PMHx of palpitations, kidney donor, [place] presents to the ambulatory to the ED from  c/o palpitations. Patient reports being in good health overall. Reports palpitations for around 5 days. Went to  and ECG showed Afib with RVR, referred to ED for further management. Has seen cardiology recently for palpitations, but at the time had normal ECG. In ED pts HR in 160s, ECG with afib w/ RVR, and received cadizem and toprol. HR normalized, still in afib on tele. Admitted to  for cardiology eval.  (05 Sep 2024 01:07)    pt is resting in the bed, still c/o 'feeling irregular heart beat' denies chest pain/SOB  tele: AFib 's with activity  EKG: AFib 171 bpm, QRS 86ms, QTC 479ms  echo -prelim result: NL EF, NL LA, mild AS, mid-mod TR    PAST MEDICAL & SURGICAL HISTORY:  HLD (hyperlipidemia)  kidney donor       SOCIAL HISTORY: lives with wife at home  Allergies    No Known Allergies    MEDICATIONS  (STANDING):  aspirin enteric coated 81 milliGRAM(s) Oral daily  atorvastatin 40 milliGRAM(s) Oral at bedtime  heparin   Injectable 5000 Unit(s) SubCutaneous every 8 hours  lactated ringers. 1000 milliLiter(s) (100 mL/Hr) IV Continuous <Continuous>  metoprolol tartrate 25 milliGRAM(s) Oral three times a day  senna 2 Tablet(s) Oral at bedtime    MEDICATIONS  (PRN):  acetaminophen     Tablet .. 650 milliGRAM(s) Oral every 6 hours PRN Temp greater or equal to 38C (100.4F), Mild Pain (1 - 3)  aluminum hydroxide/magnesium hydroxide/simethicone Suspension 30 milliLiter(s) Oral every 4 hours PRN Dyspepsia  melatonin 3 milliGRAM(s) Oral at bedtime PRN Insomnia  ondansetron Injectable 4 milliGRAM(s) IV Push every 8 hours PRN Nausea and/or Vomiting    ROS: All other ROS is negative unless indicated above.      Physical Exam:  Vital Signs Last 24 Hrs  T(C): 36.6 (05 Sep 2024 08:38), Max: 37.1 (05 Sep 2024 04:56)  T(F): 97.9 (05 Sep 2024 08:38), Max: 98.7 (05 Sep 2024 04:56)  HR: 83 (05 Sep 2024 08:38) (80 - 164)  BP: 117/77 (05 Sep 2024 08:38) (94/75 - 132/97)  BP(mean): 88 (05 Sep 2024 08:38) (83 - 107)  RR: 18 (05 Sep 2024 08:38) (16 - 18)  SpO2: 99% (05 Sep 2024 08:38) (97% - 100%)    Parameters below as of 05 Sep 2024 08:38  Patient On (Oxygen Delivery Method): room air      Constitutional: well developed, no deformities and no acute distress    Neurological: Alert & Oriented x 3, SHEARER, no focal deficits    HEENT: NC/AT, PERRLA, EOMI,  Neck supple.    Respiratory: CTA B/L, No wheezing/crackles/rhonchi    Cardiovascular: (+) irregular S1 & S2,     Gastrointestinal: soft, NT, nondistended, (+) BS    Genitourinary: non distended bladder, voiding freely    Extremities: No pedal edema, No clubbing, No cyanosis    Skin:  normal skin color and pigmentation, no skin lesions          LABS:                        12.9   4.90  )-----------( 170      ( 05 Sep 2024 06:33 )             37.7     09-05    138  |  108  |  18  ----------------------------<  96  3.8   |  24  |  1.03    Ca    8.3<L>      05 Sep 2024 06:33  Phos  3.0     09-05  Mg     2.2     09-05    TPro  6.3  /  Alb  3.5  /  TBili  0.6  /  DBili  x   /  AST  26  /  ALT  31  /  AlkPhos  65  09-05    PT/INR - ( 04 Sep 2024 19:55 )   PT: 10.9 sec;   INR: 0.96 ratio         PTT - ( 04 Sep 2024 19:55 )  PTT:31.6 sec  Urinalysis Basic - ( 05 Sep 2024 06:33 )    Color: x / Appearance: x / SG: x / pH: x  Gluc: 96 mg/dL / Ketone: x  / Bili: x / Urobili: x   Blood: x / Protein: x / Nitrite: x   Leuk Esterase: x / RBC: x / WBC x   Sq Epi: x / Non Sq Epi: x / Bacteria: x    TSH: WNL HPI:  63 y/o M with PMHx of palpitations, kidney donor, [facility] compliant with oral appliance, presents to the ambulatory to the ED from  c/o palpitations. Patient reports being in good health overall. Reports palpitations for around 5 days. Went to  and ECG showed Afib with RVR, referred to ED for further management. Has seen cardiology recently for palpitations, but at the time had normal ECG. In ED pts HR in 160s, ECG with afib w/ RVR, and received cadizem and toprol. HR normalized, still in afib on tele. Admitted to  for cardiology eval.  (05 Sep 2024 01:07)    pt is resting in the bed, still c/o 'feeling irregular heart beat' denies chest pain/SOB  tele: AFib 's with activity  EKG: AFib 171 bpm, QRS 86ms, QTC 479ms  echo -prelim result: NL EF, NL LA, mild AS, mid-mod TR    PAST MEDICAL & SURGICAL HISTORY:  HLD (hyperlipidemia)  [facility]RI[facility]IAN[facility]  kidney donor       SOCIAL HISTORY: lives with wife at home  Allergies    No Known Allergies    MEDICATIONS  (STANDING):  aspirin enteric coated 81 milliGRAM(s) Oral daily  atorvastatin 40 milliGRAM(s) Oral at bedtime  heparin   Injectable 5000 Unit(s) SubCutaneous every 8 hours  lactated ringers. 1000 milliLiter(s) (100 mL/Hr) IV Continuous <Continuous>  metoprolol tartrate 25 milliGRAM(s) Oral three times a day  senna 2 Tablet(s) Oral at bedtime    MEDICATIONS  (PRN):  acetaminophen     Tablet .. 650 milliGRAM(s) Oral every 6 hours PRN Temp greater or equal to 38C (100.4F), Mild Pain (1 - 3)  aluminum hydroxide/magnesium hydroxide/simethicone Suspension 30 milliLiter(s) Oral every 4 hours PRN Dyspepsia  melatonin 3 milliGRAM(s) Oral at bedtime PRN Insomnia  ondansetron Injectable 4 milliGRAM(s) IV Push every 8 hours PRN Nausea and/or Vomiting    ROS: All other ROS is negative unless indicated above.      Physical Exam:  Vital Signs Last 24 Hrs  T(C): 36.6 (05 Sep 2024 08:38), Max: 37.1 (05 Sep 2024 04:56)  T(F): 97.9 (05 Sep 2024 08:38), Max: 98.7 (05 Sep 2024 04:56)  HR: 83 (05 Sep 2024 08:38) (80 - 164)  BP: 117/77 (05 Sep 2024 08:38) (94/75 - 132/97)  BP(mean): 88 (05 Sep 2024 08:38) (83 - 107)  RR: 18 (05 Sep 2024 08:38) (16 - 18)  SpO2: 99% (05 Sep 2024 08:38) (97% - 100%)    Parameters below as of 05 Sep 2024 08:38  Patient On (Oxygen Delivery Method): room air      Constitutional: well developed, no deformities and no acute distress    Neurological: Alert & Oriented x 3, SHEARER, no focal deficits    HEENT: NC/AT, PERRLA, EOMI,  Neck supple.    Respiratory: CTA B/L, No wheezing/crackles/rhonchi    Cardiovascular: (+) irregular S1 & S2,     Gastrointestinal: soft, NT, nondistended, (+) BS    Genitourinary: non distended bladder, voiding freely    Extremities: No pedal edema, No clubbing, No cyanosis    Skin:  normal skin color and pigmentation, no skin lesions          LABS:                        12.9   4.90  )-----------( 170      ( 05 Sep 2024 06:33 )             37.7     09-05    138  |  108  |  18  ----------------------------<  96  3.8   |  24  |  1.03    Ca    8.3<L>      05 Sep 2024 06:33  Phos  3.0     09-05  Mg     2.2     09-05    TPro  6.3  /  Alb  3.5  /  TBili  0.6  /  DBili  x   /  AST  26  /  ALT  31  /  AlkPhos  65  09-05    PT/INR - ( 04 Sep 2024 19:55 )   PT: 10.9 sec;   INR: 0.96 ratio         PTT - ( 04 Sep 2024 19:55 )  PTT:31.6 sec  Urinalysis Basic - ( 05 Sep 2024 06:33 )    Color: x / Appearance: x / SG: x / pH: x  Gluc: 96 mg/dL / Ketone: x  / Bili: x / Urobili: x   Blood: x / Protein: x / Nitrite: x   Leuk Esterase: x / RBC: x / WBC x   Sq Epi: x / Non Sq Epi: x / Bacteria: x    TSH: WNL

## 2024-09-05 NOTE — SBIRT NOTE ADULT - NSSBIRTDRGPOSREINDET_GEN_A_CORE
Reinforced importance of using drugs for prescribed  medical reasons only. Pt verbalizes understanding

## 2024-09-05 NOTE — CONSULT NOTE ADULT - ASSESSMENT
63 yo M with above PMHx presented with PAFib w/RVR, symptomatic from RVR  - d/c metoprolol  - start IV cardizem drip for rate control  - continue eliquis 5mg po BID  - maintain K+>4.0, Mg++>2.0  - r/b/i/a of AFIb ablation vs AAD discussed with pt, pt prefers AF ablation as out pt  - keep pt NPO post MN for LILLIAN/DCCV tomorrow if remains in AF  Plan d/w pt//cardio/hospitlaist/RN

## 2024-09-06 ENCOUNTER — TRANSCRIPTION ENCOUNTER (OUTPATIENT)
Age: 64
End: 2024-09-06

## 2024-09-06 ENCOUNTER — RESULT REVIEW (OUTPATIENT)
Age: 64
End: 2024-09-06

## 2024-09-06 VITALS
DIASTOLIC BLOOD PRESSURE: 89 MMHG | OXYGEN SATURATION: 100 % | HEART RATE: 74 BPM | SYSTOLIC BLOOD PRESSURE: 108 MMHG | RESPIRATION RATE: 16 BRPM

## 2024-09-06 PROBLEM — E78.5 HYPERLIPIDEMIA, UNSPECIFIED: Chronic | Status: ACTIVE | Noted: 2024-09-05

## 2024-09-06 LAB
ANION GAP SERPL CALC-SCNC: 7 MMOL/L — SIGNIFICANT CHANGE UP (ref 5–17)
BUN SERPL-MCNC: 18 MG/DL — SIGNIFICANT CHANGE UP (ref 7–23)
CALCIUM SERPL-MCNC: 8.8 MG/DL — SIGNIFICANT CHANGE UP (ref 8.5–10.1)
CHLORIDE SERPL-SCNC: 111 MMOL/L — HIGH (ref 96–108)
CO2 SERPL-SCNC: 25 MMOL/L — SIGNIFICANT CHANGE UP (ref 22–31)
CREAT SERPL-MCNC: 1.15 MG/DL — SIGNIFICANT CHANGE UP (ref 0.5–1.3)
EGFR: 71 ML/MIN/1.73M2 — SIGNIFICANT CHANGE UP
GLUCOSE SERPL-MCNC: 115 MG/DL — HIGH (ref 70–99)
HCT VFR BLD CALC: 39.6 % — SIGNIFICANT CHANGE UP (ref 39–50)
HGB BLD-MCNC: 13.2 G/DL — SIGNIFICANT CHANGE UP (ref 13–17)
MCHC RBC-ENTMCNC: 33.2 PG — SIGNIFICANT CHANGE UP (ref 27–34)
MCHC RBC-ENTMCNC: 33.3 GM/DL — SIGNIFICANT CHANGE UP (ref 32–36)
MCV RBC AUTO: 99.5 FL — SIGNIFICANT CHANGE UP (ref 80–100)
PLATELET # BLD AUTO: 160 K/UL — SIGNIFICANT CHANGE UP (ref 150–400)
POTASSIUM SERPL-MCNC: 4.2 MMOL/L — SIGNIFICANT CHANGE UP (ref 3.5–5.3)
POTASSIUM SERPL-SCNC: 4.2 MMOL/L — SIGNIFICANT CHANGE UP (ref 3.5–5.3)
RBC # BLD: 3.98 M/UL — LOW (ref 4.2–5.8)
RBC # FLD: 13.4 % — SIGNIFICANT CHANGE UP (ref 10.3–14.5)
SODIUM SERPL-SCNC: 143 MMOL/L — SIGNIFICANT CHANGE UP (ref 135–145)
WBC # BLD: 4.66 K/UL — SIGNIFICANT CHANGE UP (ref 3.8–10.5)
WBC # FLD AUTO: 4.66 K/UL — SIGNIFICANT CHANGE UP (ref 3.8–10.5)

## 2024-09-06 PROCEDURE — 93010 ELECTROCARDIOGRAM REPORT: CPT

## 2024-09-06 PROCEDURE — 93325 DOPPLER ECHO COLOR FLOW MAPG: CPT | Mod: 26

## 2024-09-06 PROCEDURE — 99239 HOSP IP/OBS DSCHRG MGMT >30: CPT

## 2024-09-06 PROCEDURE — 93320 DOPPLER ECHO COMPLETE: CPT | Mod: 26

## 2024-09-06 PROCEDURE — 99232 SBSQ HOSP IP/OBS MODERATE 35: CPT

## 2024-09-06 RX ORDER — METOPROLOL TARTRATE 100 MG/1
1 TABLET ORAL
Qty: 30 | Refills: 0
Start: 2024-09-06

## 2024-09-06 RX ORDER — APIXABAN 5 MG/1
1 TABLET, FILM COATED ORAL
Qty: 60 | Refills: 0
Start: 2024-09-06

## 2024-09-06 RX ORDER — ROSUVASTATIN CALCIUM 10 MG/1
1 TABLET ORAL
Refills: 0 | DISCHARGE

## 2024-09-06 RX ADMIN — Medication 81 MILLIGRAM(S): at 10:05

## 2024-09-06 RX ADMIN — APIXABAN 5 MILLIGRAM(S): 5 TABLET, FILM COATED ORAL at 10:05

## 2024-09-06 NOTE — DISCHARGE NOTE PROVIDER - ATTENDING DISCHARGE PHYSICAL EXAMINATION:
VITALS:  T(F): 97.7 (09-06-24 @ 07:58), Max: 97.7 (09-06-24 @ 07:58)  HR: 74 (09-06-24 @ 13:20) (67 - 140)  BP: 108/89 (09-06-24 @ 13:20) (101/79 - 129/90)  RR: 16 (09-06-24 @ 13:20) (16 - 18)  SpO2: 100% (09-06-24 @ 13:20) (98% - 100%)  Wt(kg): --    I&O's Summary    05 Sep 2024 07:01  -  06 Sep 2024 07:00  --------------------------------------------------------  IN: 60 mL / OUT: 1400 mL / NET: -1340 mL        CAPILLARY BLOOD GLUCOSE          PHYSICAL EXAM:  Gen: No acute distress   HEENT:  pupils equal and reactive, EOMI,   NECK:   supple, no carotid bruits, No JVD  CV:  +S1, +S2, regular rate rhythm, no murmurs or rubs  RESP:   lungs clear to auscultation bilaterally, no wheezing, rales, rhonchi, good air entry bilaterally  GI:  abdomen soft, non-tender, non-distended, normal BS, no bruits, no abdominal masses, no palpable masses  MSK:   normal muscle tone, no atrophy, no rigidity, no contractions  EXT:  no clubbing, no cyanosis, no edema, no calf pain, swelling or erythema  VASCULAR:  pulses equal and symmetric in the upper and lower extremities  NEURO:  AAOX3, no focal neurological deficits, follows all commands, able to move extremities spontaneously  SKIN:  no ulcers, lesions or rashes

## 2024-09-06 NOTE — DISCHARGE NOTE PROVIDER - CARE PROVIDER_API CALL
Ramón Cosby  Cardiac Electrophysiology  [address] [place] [address]  Phone: [phone]  Fax: [phone]  Follow Up Time: 1 week    Jacob Tompkins  Pulmonary Disease  [address], [address]  [place] [address]  Phone: [phone]  Fax: [phone]  Follow Up Time: 1 week    Alexandro Chua  Cardiovascular Disease  [address], [address]  [place] [address]  Phone: [phone]  Fax: [phone]  Follow Up Time: 1 week

## 2024-09-06 NOTE — DISCHARGE NOTE PROVIDER - NSDCMRMEDTOKEN_GEN_ALL_CORE_FT
ammonium lactate 12% topical lotion: Apply topically to affected area 2 times a day as needed for  rash  Centrum Silver oral tablet: 1 tab(s) orally once a day  ciclopirox 0.77% topical cream: Apply topically to affected area 2 times a day as needed for  rash  rosuvastatin 10 mg oral tablet: 1 tab(s) orally once a day  tadalafil 5 mg oral tablet: 1 tab(s) orally prn as needed for intercourse   ammonium lactate 12% topical lotion: Apply topically to affected area 2 times a day as needed for  rash  apixaban 5 mg oral tablet: 1 tab(s) orally every 12 hours  Centrum Silver oral tablet: 1 tab(s) orally once a day  ciclopirox 0.77% topical cream: Apply topically to affected area 2 times a day as needed for  rash  rosuvastatin 10 mg oral tablet: 1 tab(s) orally once a day  tadalafil 5 mg oral tablet: 1 tab(s) orally prn as needed for intercourse  Toprol-XL 50 mg oral tablet, extended release: 1 tab(s) orally once a day

## 2024-09-06 NOTE — PROGRESS NOTE ADULT - ASSESSMENT
64M admitted for new onset afib.     Afib.  new onset  Continue aspirin, atorvastatin 40,   DC Metoprolol  Start Eliquis   ECHO pending   Monitor on tele  Cardio consult.  Start IV Cardizem Drip  Maintain HR <100   NPO post MN for LILLIAN/DCCV tomorrow if remains in AF      Code Status Full Code
63 yo M with above PMHx presented with PAFib w/RVR, symptomatic from RVR  s/p LILLIAN/DCCV today, SR 70's bpm  - start toprol 50mg daily  - uninterrupted eliquis 5mg po BID  - maintain K+>4.0, Mg++>2.0  - r/b/i/a of AFIb ablation vs AAD discussed with pt, pt prefers AF ablation as out pt  - f/u with  on 9/9/24 @3:30pm to evaluate for  AF ablation  Plan d/w pt//cardio/hospitlaist/RN

## 2024-09-06 NOTE — DISCHARGE NOTE PROVIDER - CARE PROVIDERS DIRECT ADDRESSES
[email or web address]

## 2024-09-06 NOTE — DISCHARGE NOTE PROVIDER - NSDCFUSCHEDAPPT_GEN_ALL_CORE_FT
Ramón Cosby  [facility] Physician [facility]  ELECTROPH [address]  Scheduled Appointment: 09/09/2024    Jacob Tompkins  [facility] Physician [facility]  INTMED [address]  Scheduled Appointment: 09/24/2024

## 2024-09-06 NOTE — PROGRESS NOTE ADULT - PROBLEM SELECTOR PLAN 1
·  Problem: Afib.   ·  Recommendation: Newly diagnosed paroxsymal atrial fibrillation. CHADS-VASC = 1 and he was reluctant to be anticoagulated during recent outpatient visit with Dr. Chua   - TTE with preserved LVEF, mild AS/MR  - on cardizem gtt, for LILLIAN/DCCV today 9/6  - EP consult appreciated - AFIb ablation vs AAD discussed with pt, pt prefers AF ablation as out pt

## 2024-09-06 NOTE — PROGRESS NOTE ADULT - PROBLEM SELECTOR PLAN 2
·  Problem: Murmur.   ·  Recommendation: No known valve disease; TTE with preserved LVEF, mild AS/MR

## 2024-09-06 NOTE — PROGRESS NOTE ADULT - SUBJECTIVE AND OBJECTIVE BOX
HOSPITALIST ATTENDING PROGRESS NOTE    Chart and meds reviewed.      Subjective: Patient seen and examined. resting comfortably. HR in the 120, Discussed with EP, will start patient on cardizem drip.  continue eliquis 5mg po BID  keep pt NPO post MN for LILLIAN/DCCV tomorrow if remains in AF      Additional results/Imaging, I have personally reviewed:    LABS:                            12.9   4.90  )-----------( 170      ( 05 Sep 2024 06:33 )             37.7     09-05    138  |  108  |  18  ----------------------------<  96  3.8   |  24  |  1.03    Ca    8.3<L>      05 Sep 2024 06:33  Phos  3.0     09-05  Mg     2.2     09-05    TPro  6.3  /  Alb  3.5  /  TBili  0.6  /  DBili  x   /  AST  26  /  ALT  31  /  AlkPhos  65  09-05        LIVER FUNCTIONS - ( 05 Sep 2024 06:33 )  Alb: 3.5 g/dL / Pro: 6.3 gm/dL / ALK PHOS: 65 U/L / ALT: 31 U/L / AST: 26 U/L / GGT: x           PT/INR - ( 04 Sep 2024 19:55 )   PT: 10.9 sec;   INR: 0.96 ratio         PTT - ( 04 Sep 2024 19:55 )  PTT:31.6 sec  Urinalysis Basic - ( 05 Sep 2024 06:33 )    Color: x / Appearance: x / SG: x / pH: x  Gluc: 96 mg/dL / Ketone: x  / Bili: x / Urobili: x   Blood: x / Protein: x / Nitrite: x   Leuk Esterase: x / RBC: x / WBC x   Sq Epi: x / Non Sq Epi: x / Bacteria: x              All other systems reviewed and found to be negative with the exception of what has been described above.    MEDICATIONS  (STANDING):  apixaban 5 milliGRAM(s) Oral every 12 hours  aspirin enteric coated 81 milliGRAM(s) Oral daily  atorvastatin 40 milliGRAM(s) Oral at bedtime  diltiazem Infusion 10 mG/Hr (10 mL/Hr) IV Continuous <Continuous>  lactated ringers. 1000 milliLiter(s) (100 mL/Hr) IV Continuous <Continuous>  senna 2 Tablet(s) Oral at bedtime    MEDICATIONS  (PRN):  acetaminophen     Tablet .. 650 milliGRAM(s) Oral every 6 hours PRN Temp greater or equal to 38C (100.4F), Mild Pain (1 - 3)  aluminum hydroxide/magnesium hydroxide/simethicone Suspension 30 milliLiter(s) Oral every 4 hours PRN Dyspepsia  melatonin 3 milliGRAM(s) Oral at bedtime PRN Insomnia  ondansetron Injectable 4 milliGRAM(s) IV Push every 8 hours PRN Nausea and/or Vomiting      VITALS:  T(F): 97.9 (09-05-24 @ 08:38), Max: 98.7 (09-05-24 @ 04:56)  HR: 83 (09-05-24 @ 08:38) (80 - 164)  BP: 117/77 (09-05-24 @ 08:38) (94/75 - 132/97)  RR: 18 (09-05-24 @ 08:38) (16 - 18)  SpO2: 99% (09-05-24 @ 08:38) (97% - 100%)  Wt(kg): --    I&O's Summary      CAPILLARY BLOOD GLUCOSE          PHYSICAL EXAM:  General: non-toxic  HEENT: non-traumatic, perrla, eomi  Cardio: s1s2 irregularly irregular  Lungs: comfortable breathing, clear to auscultation  Abdomen: Soft, non-tender, non-distended  Neuro: AOx4  Ext: Pulses +2      CULTURES:      Telemetry, personally reviewed
HPI: 65 y/o M with PMHx of palpitations, kidney donor, [place] compliant with oral appliance, presents to the ambulatory to the ED from  c/o palpitations. Patient reports being in good health overall. Reports palpitations for around 5 days. Went to  and ECG showed Afib with RVR, referred to ED for further management. Has seen cardiology recently for palpitations, but at the time had normal ECG. In ED pts HR in 160s, ECG with afib w/ RVR, and received cadizem and toprol. HR normalized, still in afib on tele. Admitted to  for cardiology eval.  (05 Sep 2024 01:07)    9/5/24: pt is resting in the bed, still c/o 'feeling irregular heart beat' denies chest pain/SOB  tele: AFib 's with activity  EKG: AFib 171 bpm, QRS 86ms, QTC 479ms  echo -prelim result: NL EF, NL LA, mild AS, mid-mod TR    9/6/24: pt remained in AF on IV cardizem drip  s/p LILLIAN/DCCV today   Pt feeling better, denies chest pain/SOB/palpitations  EKG: SR 70 bpm, VA 166ms, QRS 94ms, QTC 447ms      ROS: All other ROS is negative unless indicated above.    Physical Exam:  Vital Signs Last 24 Hrs  T(C): 36.5 (06 Sep 2024 07:58), Max: 36.5 (06 Sep 2024 07:58)  T(F): 97.7 (06 Sep 2024 07:58), Max: 97.7 (06 Sep 2024 07:58)  HR: 74 (06 Sep 2024 13:20) (67 - 140)  BP: 108/89 (06 Sep 2024 13:20) (101/79 - 129/90)  RR: 16 (06 Sep 2024 13:20) (16 - 18)  SpO2: 100% (06 Sep 2024 13:20) (98% - 100%)    Parameters below as of 06 Sep 2024 13:20  Patient On (Oxygen Delivery Method): room air      Constitutional: well developed, well nourished, no deformities and no acute distress    Neurological: Alert & Oriented x 3, SHEARER, no focal deficits    HEENT: NC/AT, PERRLA, EOMI,  Neck supple.    Respiratory: CTA B/L, No wheezing/crackles/rhonchi    Cardiovascular: (+) S1 & S2, RRR, No m/r/g    Gastrointestinal: soft, NT, nondistended, (+) BS    Genitourinary: non distended bladder, voiding freely    Extremities: No pedal edema, No clubbing, No cyanosis    Skin:  normal skin color and pigmentation, no skin lesions            Allergies    No Known Allergies    Intolerances      MEDICATIONS  (STANDING):  apixaban 5 milliGRAM(s) Oral every 12 hours  aspirin enteric coated 81 milliGRAM(s) Oral daily  atorvastatin 40 milliGRAM(s) Oral at bedtime  senna 2 Tablet(s) Oral at bedtime    MEDICATIONS  (PRN):  acetaminophen     Tablet .. 650 milliGRAM(s) Oral every 6 hours PRN Temp greater or equal to 38C (100.4F), Mild Pain (1 - 3)  aluminum hydroxide/magnesium hydroxide/simethicone Suspension 30 milliLiter(s) Oral every 4 hours PRN Dyspepsia  melatonin 3 milliGRAM(s) Oral at bedtime PRN Insomnia  ondansetron Injectable 4 milliGRAM(s) IV Push every 8 hours PRN Nausea and/or Vomiting    LABS:                        13.2   4.66  )-----------( 160      ( 06 Sep 2024 06:34 )             39.6     09-06    143  |  111<H>  |  18  ----------------------------<  115<H>  4.2   |  25  |  1.15    Ca    8.8      06 Sep 2024 06:34  Phos  3.0     09-05  Mg     2.2     09-05    TPro  6.3  /  Alb  3.5  /  TBili  0.6  /  DBili  x   /  AST  26  /  ALT  31  /  AlkPhos  65  09-05    PT/INR - ( 04 Sep 2024 19:55 )   PT: 10.9 sec;   INR: 0.96 ratio         PTT - ( 04 Sep 2024 19:55 )  PTT:31.6 sec  Urinalysis Basic - ( 06 Sep 2024 06:34 )    Color: x / Appearance: x / SG: x / pH: x  Gluc: 115 mg/dL / Ketone: x  / Bili: x / Urobili: x   Blood: x / Protein: x / Nitrite: x   Leuk Esterase: x / RBC: x / WBC x   Sq Epi: x / Non Sq Epi: x / Bacteria: x                    
  CHIEF COMPLAINT: Patient is a 64y old  Male who presents with a chief complaint of     HPI:  63 y/o man with a history of suspected 1st AF episode in July 2024 (did not seek medical attention; spontaneously resolved), HLD, and kidney donor, who presented to the ER with AF + RVR after seeking care at a local urgent care center for palpitations.  He describes the abrupt onset of severely symptomatic palpitations while shopping -- felt lightheaded and needed to sit down.  No clear exacerbating or alleviating factors.  I met him last week in outpatient cardiology consultation -- I ordered an echo because of a heart murmur; imaging has not yet been performed.    9/6/24 - went for LILLIAN/DCCV    MEDICATIONS  (STANDING):  apixaban 5 milliGRAM(s) Oral every 12 hours  aspirin enteric coated 81 milliGRAM(s) Oral daily  atorvastatin 40 milliGRAM(s) Oral at bedtime  diltiazem Infusion 10 mG/Hr (10 mL/Hr) IV Continuous <Continuous>  senna 2 Tablet(s) Oral at bedtime      MEDICATIONS  (PRN):  acetaminophen     Tablet .. 650 milliGRAM(s) Oral every 6 hours PRN Temp greater or equal to 38C (100.4F), Mild Pain (1 - 3)  aluminum hydroxide/magnesium hydroxide/simethicone Suspension 30 milliLiter(s) Oral every 4 hours PRN Dyspepsia  melatonin 3 milliGRAM(s) Oral at bedtime PRN Insomnia  ondansetron Injectable 4 milliGRAM(s) IV Push every 8 hours PRN Nausea and/or Vomiting    Vital Signs Last 24 Hrs  T(C): 36.5 (06 Sep 2024 07:58), Max: 36.5 (06 Sep 2024 07:58)  T(F): 97.7 (06 Sep 2024 07:58), Max: 97.7 (06 Sep 2024 07:58)  HR: 74 (06 Sep 2024 13:20) (67 - 140)  BP: 108/89 (06 Sep 2024 13:20) (101/79 - 129/90)  BP(mean): --  RR: 16 (06 Sep 2024 13:20) (16 - 18)  SpO2: 100% (06 Sep 2024 13:20) (98% - 100%)    Parameters below as of 06 Sep 2024 13:20  Patient On (Oxygen Delivery Method): room air    PHYSICAL EXAM:  Constitutional: NAD, awake and alert  HEENT:  EOMI,  Pupils round, No oral cyanosis.  Pulmonary: Non-labored, breath sounds are clear bilaterally, No wheezing, rales or rhonchi  Cardiovascular: S1 and S2, irregular rhythm, normal rate (at rest), murmur present  Gastrointestinal: Bowel Sounds present, soft, nontender.   Lymph: No peripheral edema. No cervical lymphadenopathy.  Neurological: Alert, no focal deficits  Skin: No rashes.  Psych:  Mood & affect appropriate    LABS: reviewed                               13.2   4.66  )-----------( 160      ( 06 Sep 2024 06:34 )             39.6     09-06    143  |  111<H>  |  18  ----------------------------<  115<H>  4.2   |  25  |  1.15    Ca    8.8      06 Sep 2024 06:34  Phos  3.0     09-05  Mg     2.2     09-05    TPro  6.3  /  Alb  3.5  /  TBili  0.6  /  DBili  x   /  AST  26  /  ALT  31  /  AlkPhos  65  09-05    - TroponinI hsT: <-40.69, <-30.46    Radiology/EKG/TTE: reviewed     < from: TTE W or WO Ultrasound Enhancing Agent (09.05.24 @ 08:52) >  CONCLUSIONS:      1. Left ventricular systolic function is normal with an ejection fraction of 57 % by Gonzalez's method of disks.   2. Left atrium is mildly dilated.   3. The right atrium is mildly dilated.   4. Mild mitral regurgitation.   5. The averaged aortic valve LVOT/AV VTI Ratio is .46 is suggestive of mild to moderate aortic stenosis.      2D imaging suggests at least mild aortic stenosis.   6. Aortic root at the sinuses of Valsalva is dilated, measuring 3.90 cm (indexed 1.69 cm/m²). Ascending aorta is dilated, measuring 3.95 cm (indexed 1.71 cm/m²).   7. The peak transaortic velocity is 2.24 m/s, peak transaortic gradient is 20.1 mmHg and mean transaortic gradient is 12.0 mmHg with an LVOT/aortic valve VTI ratio of 0.44. The effective orifice area is estimated at 2.00 cm² by the continuity equation.   8. Trileaflet aortic valve with reduced systolic excursion. There is moderate calcification of the aortic valve leaflets. Mild aortic stenosis.    ________________________________________________________________________________________    < end of copied text >    < from: 12 Lead ECG (09.04.24 @ 19:39) >    Diagnosis Line *** Critical Test Result: High HR  Atrial fibrillation, rapidventricular response.  Nonspecific ST abnormality  Abnormal ECG  When compared with ECG of 11-APR-2024 14:06,  Atrial flutter has replaced Sinus rhythm  Vent. rate has increased  BPM  T wave amplitude has decreased in Lateral leads  Confirmed by MD OCTAVIO, ELVIRA ([id number]) on 9/5/2024 5:13:44 PM    < end of copied text >

## 2024-09-06 NOTE — PROCEDURAL SAFETY CHECKLIST WITH OR WITHOUT SEDATION - NSPOSTCOMMENTFT_GEN_ALL_CORE
Time out at 1239 Anesthesia start at 1240 Probe in at 1244 Probe out at 1248 CVx1 at 150J with conversion to NSR confirmed by dr siu via 12 lead ekg Anesthesia end at 1300.

## 2024-09-06 NOTE — DISCHARGE NOTE PROVIDER - NSDCCPCAREPLAN_GEN_ALL_CORE_FT
PRINCIPAL DISCHARGE DIAGNOSIS  Diagnosis: Rapid atrial fibrillation  Assessment and Plan of Treatment: WHAT IS ATRIAL FIBRILLATION? Atrial fibrillation (AFIB) is a cardiac arrhythmia caused by a disorder in the hearts electrical system. An arrhythmia is a problem with the speed or rhythm of the heartbeat. Atrial fibrillation is the most common type of arrhythmia.  THINGS TO DO: (1) Monitor your blood pressure and heart rate (2) Limit or avoid alcohol intake – alcohol can increase your risk of AFIB (3) Do not smoke – nicotine can damage your heart and make it difficult to manage your AFIB (4) Eat heart healthy foods like fruits, vegetables, and whole grains (5) Manage a healthy weight (5) Get regular physical activity  MONITOR THESE SIGNS AND SYMPTOMS: (1) Shortness of breath (2) Nausea or vomiting (3) Chest pain or pressure (4) Chest palpitations. If you experience any of these, DO alert your primary care provider, or return to the Emergency Department if you feel very sick.

## 2024-09-06 NOTE — DISCHARGE NOTE PROVIDER - HOSPITAL COURSE
65 y/o M with PMHx of palpitations, kidney donor, [place] presents to the ambulatory to the ED from  c/o palpitations. Patient reports being in good health overall. Reports palpitations for around 5 days. Went to  and ECG showed Afib with RVR, referred to ED for further management. Has seen cardiology recently for palpitations, but at the time had normal ECG. In ED pts HR in 160s, ECG with afib w/ RVR, and received cadizem and toprol. HR normalized, still in afib on tele. Admitted to  for new onset afib. Patient seen by cardiology and EP. Patient started on aspirin, atorvastatin 40, metoprolol 20 TID. Patient seen by cardiology and EP. Echo Left ventricular systolic function  with an ejection fraction of 57 % . HR remained uncontrolled and transitioned to IV cardizem, Patient remained in Afib and underwent cardioversion. _________. Patient to follow up with PCP Cardiology and EP in 1-2 weeks. Will resume metoprolol on discharge. Advised to return to ED with any recurring or worsening symptoms chest pain palpitations fevers chills nausea vomiting diarrhea.        Echo 9/524   1. Left ventricular systolic function is normal with an ejection fraction of 57 % by Gonzalez's method of disks.   2. Left atrium is mildly dilated.   3. The right atrium is mildly dilated.   4. Mild mitral regurgitation.   5. The averaged aortic valve LVOT/AV VTI Ratio is .46 is suggestive of mild to moderate aortic stenosis.      2D imaging suggests at least mild aortic stenosis.   6. Aortic root at the sinuses of Valsalva is dilated, measuring 3.90 cm (indexed 1.69 cm/m²). Ascending aorta is dilated, measuring 3.95 cm (indexed 1.71 cm/m²).   7. The peak transaortic velocity is 2.24 m/s, peak transaortic gradient is 20.1 mmHg and mean transaortic gradient is 12.0 mmHg with an LVOT/aortic valve VTI ratio of 0.44. The effective orifice area is estimated at 2.00 cm² by the continuity equation.   8. Trileaflet aortic valve with reduced systolic excursion. There is moderate calcification of the aortic valve leaflets. Mild aortic stenosis.       63 y/o M with PMHx of palpitations, kidney donor, [place] presents to the ambulatory to the ED from  c/o palpitations. Patient reports being in good health overall. Reports palpitations for around 5 days. Went to  and ECG showed Afib with RVR, referred to ED for further management. Has seen cardiology recently for palpitations, but at the time had normal ECG. In ED pts HR in 160s, ECG with afib w/ RVR, and received cadizem and toprol. HR normalized, still in afib on tele. Admitted to  for new onset afib. Patient seen by cardiology and EP. Patient started on aspirin, atorvastatin 40, metoprolol 20 TID. Patient seen by cardiology and EP. Echo Left ventricular systolic function  with an ejection fraction of 57 % . HR remained uncontrolled and transitioned to IV cardizem, Patient remained in Afib and underwent cardioversion. Patient to follow up with PCP Cardiology and EP in 1-2 weeks. Will start toprol 50mg daily on discharge and continue uninterrupted eliquis 5mg po BID. Advised to return to ED with any recurring or worsening symptoms chest pain palpitations fevers chills nausea vomiting diarrhea.        Echo 9/524   1. Left ventricular systolic function is normal with an ejection fraction of 57 % by Gonzalez's method of disks.   2. Left atrium is mildly dilated.   3. The right atrium is mildly dilated.   4. Mild mitral regurgitation.   5. The averaged aortic valve LVOT/AV VTI Ratio is .46 is suggestive of mild to moderate aortic stenosis.      2D imaging suggests at least mild aortic stenosis.   6. Aortic root at the sinuses of Valsalva is dilated, measuring 3.90 cm (indexed 1.69 cm/m²). Ascending aorta is dilated, measuring 3.95 cm (indexed 1.71 cm/m²).   7. The peak transaortic velocity is 2.24 m/s, peak transaortic gradient is 20.1 mmHg and mean transaortic gradient is 12.0 mmHg with an LVOT/aortic valve VTI ratio of 0.44. The effective orifice area is estimated at 2.00 cm² by the continuity equation.   8. Trileaflet aortic valve with reduced systolic excursion. There is moderate calcification of the aortic valve leaflets. Mild aortic stenosis.

## 2024-09-06 NOTE — DISCHARGE NOTE NURSING/CASE MANAGEMENT/SOCIAL WORK - PATIENT PORTAL LINK FT
You can access the FollowMyHealth Patient Portal offered by [facility] [place] by registering at the following website: [email or web address]. By joining [employer]’s FollowMyHealth portal, you will also be able to view your health information using other applications (apps) compatible with our system.

## 2024-09-06 NOTE — PHARMACOTHERAPY INTERVENTION NOTE - COMMENTS
Spoke with outpatient pharmacy, Eliquis covered by insurance for no co-pay and is in stock. Spoke with patient at bedside and discussed indication, dosing, frequency, and side effects. Answered all questions pertaining to medications. 
Confirmed home medications with patient and Dr. [facility]. Updated patient allergies and preferred outpatient pharmacy and documented in Outpatient Medication Review.

## 2024-09-06 NOTE — DISCHARGE NOTE PROVIDER - PROVIDER TOKENS
PROVIDER:[TOKEN:[[id number]],FOLLOWUP:[1 week]],PROVIDER:[TOKEN:[[id number]],FOLLOWUP:[1 week]],PROVIDER:[TOKEN:[[id number]],FOLLOWUP:[1 week]]

## 2024-09-09 ENCOUNTER — APPOINTMENT (OUTPATIENT)
Dept: ELECTROPHYSIOLOGY | Facility: CLINIC | Age: 64
End: 2024-09-09
Payer: COMMERCIAL

## 2024-09-09 VITALS
BODY MASS INDEX: 29.82 KG/M2 | WEIGHT: 225 LBS | DIASTOLIC BLOOD PRESSURE: 78 MMHG | OXYGEN SATURATION: 100 % | HEIGHT: 73 IN | HEART RATE: 71 BPM | SYSTOLIC BLOOD PRESSURE: 112 MMHG

## 2024-09-09 DIAGNOSIS — I48.91 UNSPECIFIED ATRIAL FIBRILLATION: ICD-10-CM

## 2024-09-09 DIAGNOSIS — R00.2 PALPITATIONS: ICD-10-CM

## 2024-09-09 PROCEDURE — 99204 OFFICE O/P NEW MOD 45 MIN: CPT | Mod: 25

## 2024-09-09 PROCEDURE — 93000 ELECTROCARDIOGRAM COMPLETE: CPT

## 2024-09-09 RX ORDER — DRONEDARONE 400 MG/1
400 TABLET, FILM COATED ORAL TWICE DAILY
Qty: 180 | Refills: 1 | Status: ACTIVE | COMMUNITY
Start: 2024-09-09 | End: 1900-01-01

## 2024-09-09 RX ORDER — APIXABAN 5 MG/1
5 TABLET, FILM COATED ORAL
Refills: 0 | Status: ACTIVE | COMMUNITY

## 2024-09-09 RX ORDER — METOPROLOL SUCCINATE 25 MG/1
25 TABLET, EXTENDED RELEASE ORAL DAILY
Qty: 30 | Refills: 3 | Status: ACTIVE | COMMUNITY
Start: 1900-01-01 | End: 1900-01-01

## 2024-09-09 NOTE — DISCUSSION/SUMMARY
[EKG obtained to assist in diagnosis and management of assessed problem(s)] : EKG obtained to assist in diagnosis and management of assessed problem(s) [FreeTextEntry1] : 63yo male with symptoamtic PAF.  Rhythm control strategy is beneficial for symptomatic AF. Discussed both antiarrhythmic drugs and catheter ablation procedure option. Discussed at great length all i/r/b/a of each treatment options and all questions were answered. Patient participated in shared decision discussion and prefers to undergo AF ablation procedure.  -encouraged lifestyle modification exercise, weight loss, reduce alcohol intake  -continue uninterrupted anticoagulation, hold medication on the procedure day -For short-term while waiting for ablation procedure patient would like to start AAD, Multaq 400 mg twice daily and will stop 4 weeks post after ablation  Patient expressed understanding of the discussion and recommendations. I spent a total of 45 minutes on the encounter evaluating and discussing treatment options with the patient, as well as counseling and coordination of care as stated above.

## 2024-09-09 NOTE — HISTORY OF PRESENT ILLNESS
[FreeTextEntry1] : 64-year-old male with history of dyslipidemia left kidney donor 2020, s/p mechanical fall pneumothorax required thoracic surgery April 2024. Patient has pAF since 7/2024 and bothersome palpitations at times Apple watch alerted him for A-fib episodes during palpitations. He is admitted for AF RVR on 9.4.24 s/p LILLIAN DCCV. Pt is compliant with eliquis 5mg bid and metoprolol xl 50mg qD. He exercises routinely.  Denies cp, syncope, sob.   EGC 9.4.24 AF  bpm narrow QRS Cardiac workup: TTE from 9/2024 shows normal biventricular size and function LVEF 57% LA/RA mildly dilated mild to moderate AS

## 2024-09-11 ENCOUNTER — RX RENEWAL (OUTPATIENT)
Age: 64
End: 2024-09-11

## 2024-09-24 ENCOUNTER — APPOINTMENT (OUTPATIENT)
Dept: INTERNAL MEDICINE | Facility: CLINIC | Age: 64
End: 2024-09-24
Payer: COMMERCIAL

## 2024-09-24 VITALS
TEMPERATURE: 98.2 F | WEIGHT: 224 LBS | HEIGHT: 73 IN | SYSTOLIC BLOOD PRESSURE: 114 MMHG | BODY MASS INDEX: 29.69 KG/M2 | HEART RATE: 63 BPM | OXYGEN SATURATION: 97 % | DIASTOLIC BLOOD PRESSURE: 72 MMHG

## 2024-09-24 DIAGNOSIS — Z87.2 PERSONAL HISTORY OF DISEASES OF THE SKIN AND SUBCUTANEOUS TISSUE: ICD-10-CM

## 2024-09-24 DIAGNOSIS — L57.0 ACTINIC KERATOSIS: ICD-10-CM

## 2024-09-24 DIAGNOSIS — L30.4 ERYTHEMA INTERTRIGO: ICD-10-CM

## 2024-09-24 DIAGNOSIS — Z86.018 PERSONAL HISTORY OF OTHER BENIGN NEOPLASM: ICD-10-CM

## 2024-09-24 DIAGNOSIS — R07.81 PLEURODYNIA: ICD-10-CM

## 2024-09-24 DIAGNOSIS — H61.813 EXOSTOSIS OF EXTERNAL CANAL, BILATERAL: ICD-10-CM

## 2024-09-24 DIAGNOSIS — S22.41XA MULTIPLE FRACTURES OF RIBS, RIGHT SIDE, INITIAL ENCOUNTER FOR CLOSED FRACTURE: ICD-10-CM

## 2024-09-24 PROCEDURE — 99214 OFFICE O/P EST MOD 30 MIN: CPT

## 2024-09-24 PROCEDURE — G2211 COMPLEX E/M VISIT ADD ON: CPT | Mod: NC

## 2024-09-27 ENCOUNTER — APPOINTMENT (OUTPATIENT)
Dept: CARDIOLOGY | Facility: CLINIC | Age: 64
End: 2024-09-27

## 2024-09-28 ENCOUNTER — NON-APPOINTMENT (OUTPATIENT)
Age: 64
End: 2024-09-28

## 2024-10-02 ENCOUNTER — APPOINTMENT (OUTPATIENT)
Dept: INTERNAL MEDICINE | Facility: CLINIC | Age: 64
End: 2024-10-02
Payer: COMMERCIAL

## 2024-10-02 VITALS
TEMPERATURE: 97.8 F | WEIGHT: 220 LBS | OXYGEN SATURATION: 97 % | RESPIRATION RATE: 16 BRPM | BODY MASS INDEX: 29.16 KG/M2 | SYSTOLIC BLOOD PRESSURE: 130 MMHG | HEIGHT: 73 IN | HEART RATE: 57 BPM | DIASTOLIC BLOOD PRESSURE: 78 MMHG

## 2024-10-02 DIAGNOSIS — H90.3 SENSORINEURAL HEARING LOSS, BILATERAL: ICD-10-CM

## 2024-10-02 DIAGNOSIS — Z12.11 ENCOUNTER FOR SCREENING FOR MALIGNANT NEOPLASM OF COLON: ICD-10-CM

## 2024-10-02 DIAGNOSIS — E78.5 HYPERLIPIDEMIA, UNSPECIFIED: ICD-10-CM

## 2024-10-02 DIAGNOSIS — I65.23 OCCLUSION AND STENOSIS OF BILATERAL CAROTID ARTERIES: ICD-10-CM

## 2024-10-02 DIAGNOSIS — H69.91 UNSPECIFIED EUSTACHIAN TUBE DISORDER, RIGHT EAR: ICD-10-CM

## 2024-10-02 DIAGNOSIS — Z52.4 KIDNEY DONOR: ICD-10-CM

## 2024-10-02 DIAGNOSIS — J93.9 PNEUMOTHORAX, UNSPECIFIED: ICD-10-CM

## 2024-10-02 DIAGNOSIS — S22.5XXA: ICD-10-CM

## 2024-10-02 DIAGNOSIS — Z86.018 PERSONAL HISTORY OF OTHER BENIGN NEOPLASM: ICD-10-CM

## 2024-10-02 DIAGNOSIS — I48.91 UNSPECIFIED ATRIAL FIBRILLATION: ICD-10-CM

## 2024-10-02 DIAGNOSIS — L85.3 XEROSIS CUTIS: ICD-10-CM

## 2024-10-02 PROCEDURE — 99213 OFFICE O/P EST LOW 20 MIN: CPT

## 2024-10-02 NOTE — HISTORY OF PRESENT ILLNESS
[FreeTextEntry1] : Follow-up [de-identified] : Mr. Levin presents for a follow-up evaluation.  [facility].  Patient had a chest x-ray and was told that he had an abnormality on the right lung.  Mr. 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  He had a right thoracotomy with evacuation of hemothorax, open reduction and internal fixation of ribs 7 8 and 9 with rib plating on/12/24.  Currently, he is asymptomatic.

## 2024-10-02 NOTE — PLAN
[FreeTextEntry1] : Mr. Levin presents for follow-up evaluation.  He went to urgent care yesterday and had a chest x-ray after complaining of a persistent cough.  Chest x-ray shows rib plating of ribs 7 8 and 9 on the right side.  There is minimal linear atelectasis below the ninth rib.  There were no other significant abnormalities.  Patient will follow-up as scheduled in 5/25.

## 2024-10-18 ENCOUNTER — APPOINTMENT (OUTPATIENT)
Dept: ELECTROPHYSIOLOGY | Facility: CLINIC | Age: 64
End: 2024-10-18

## 2024-11-06 ENCOUNTER — TRANSCRIPTION ENCOUNTER (OUTPATIENT)
Age: 64
End: 2024-11-06

## 2024-11-06 ENCOUNTER — INPATIENT (INPATIENT)
Facility: HOSPITAL | Age: 64
LOS: 0 days | Discharge: HOME | DRG: 310 | End: 2024-11-07
Attending: INTERNAL MEDICINE | Admitting: INTERNAL MEDICINE
Payer: COMMERCIAL

## 2024-11-06 VITALS
OXYGEN SATURATION: 100 % | HEART RATE: 57 BPM | RESPIRATION RATE: 16 BRPM | DIASTOLIC BLOOD PRESSURE: 86 MMHG | WEIGHT: 224.87 LBS | TEMPERATURE: 98 F | HEIGHT: 76 IN | SYSTOLIC BLOOD PRESSURE: 148 MMHG

## 2024-11-06 DIAGNOSIS — I48.91 UNSPECIFIED ATRIAL FIBRILLATION: ICD-10-CM

## 2024-11-06 LAB
ANION GAP SERPL CALC-SCNC: 10 MMOL/L — SIGNIFICANT CHANGE UP (ref 5–17)
BLD GP AB SCN SERPL QL: SIGNIFICANT CHANGE UP
BUN SERPL-MCNC: 16.6 MG/DL — SIGNIFICANT CHANGE UP (ref 8–20)
CALCIUM SERPL-MCNC: 9.2 MG/DL — SIGNIFICANT CHANGE UP (ref 8.4–10.5)
CHLORIDE SERPL-SCNC: 103 MMOL/L — SIGNIFICANT CHANGE UP (ref 96–108)
CO2 SERPL-SCNC: 25 MMOL/L — SIGNIFICANT CHANGE UP (ref 22–29)
CREAT SERPL-MCNC: 1.1 MG/DL — SIGNIFICANT CHANGE UP (ref 0.5–1.3)
EGFR: 74 ML/MIN/1.73M2 — SIGNIFICANT CHANGE UP
GLUCOSE SERPL-MCNC: 93 MG/DL — SIGNIFICANT CHANGE UP (ref 70–99)
HCT VFR BLD CALC: 40.4 % — SIGNIFICANT CHANGE UP (ref 39–50)
HGB BLD-MCNC: 14.2 G/DL — SIGNIFICANT CHANGE UP (ref 13–17)
INR BLD: 1.07 RATIO — SIGNIFICANT CHANGE UP (ref 0.85–1.16)
MAGNESIUM SERPL-MCNC: 2.3 MG/DL — SIGNIFICANT CHANGE UP (ref 1.6–2.6)
MCHC RBC-ENTMCNC: 33.9 PG — SIGNIFICANT CHANGE UP (ref 27–34)
MCHC RBC-ENTMCNC: 35.1 G/DL — SIGNIFICANT CHANGE UP (ref 32–36)
MCV RBC AUTO: 96.4 FL — SIGNIFICANT CHANGE UP (ref 80–100)
PLATELET # BLD AUTO: 182 K/UL — SIGNIFICANT CHANGE UP (ref 150–400)
POTASSIUM SERPL-MCNC: 4.5 MMOL/L — SIGNIFICANT CHANGE UP (ref 3.5–5.3)
POTASSIUM SERPL-SCNC: 4.5 MMOL/L — SIGNIFICANT CHANGE UP (ref 3.5–5.3)
PROTHROM AB SERPL-ACNC: 12.1 SEC — SIGNIFICANT CHANGE UP (ref 9.9–13.4)
RBC # BLD: 4.19 M/UL — LOW (ref 4.2–5.8)
RBC # FLD: 12.1 % — SIGNIFICANT CHANGE UP (ref 10.3–14.5)
SODIUM SERPL-SCNC: 138 MMOL/L — SIGNIFICANT CHANGE UP (ref 135–145)
WBC # BLD: 4.25 K/UL — SIGNIFICANT CHANGE UP (ref 3.8–10.5)
WBC # FLD AUTO: 4.25 K/UL — SIGNIFICANT CHANGE UP (ref 3.8–10.5)

## 2024-11-06 PROCEDURE — 93010 ELECTROCARDIOGRAM REPORT: CPT

## 2024-11-06 PROCEDURE — 93657 TX L/R ATRIAL FIB ADDL: CPT

## 2024-11-06 PROCEDURE — 93622 COMP EP EVAL L VENTR PAC&REC: CPT | Mod: 26

## 2024-11-06 PROCEDURE — 93656 COMPRE EP EVAL ABLTJ ATR FIB: CPT

## 2024-11-06 PROCEDURE — 93623 PRGRMD STIMJ&PACG IV RX NFS: CPT | Mod: 26

## 2024-11-06 RX ORDER — ROSUVASTATIN CALCIUM 10 MG
10 TABLET ORAL AT BEDTIME
Refills: 0 | Status: DISCONTINUED | OUTPATIENT
Start: 2024-11-06 | End: 2024-11-07

## 2024-11-06 RX ORDER — INFLUENZ VIR VAC TV P-SURF2003 15MCG/.5ML
0.5 SYRINGE (ML) INTRAMUSCULAR ONCE
Refills: 0 | Status: DISCONTINUED | OUTPATIENT
Start: 2024-11-06 | End: 2024-11-07

## 2024-11-06 RX ORDER — PANTOPRAZOLE SODIUM 40 MG/1
40 TABLET, DELAYED RELEASE ORAL
Refills: 0 | Status: DISCONTINUED | OUTPATIENT
Start: 2024-11-06 | End: 2024-11-07

## 2024-11-06 RX ORDER — MAGNESIUM, ALUMINUM HYDROXIDE 200-200 MG
30 TABLET,CHEWABLE ORAL EVERY 6 HOURS
Refills: 0 | Status: DISCONTINUED | OUTPATIENT
Start: 2024-11-06 | End: 2024-11-07

## 2024-11-06 RX ORDER — SODIUM CHLORIDE 9 MG/ML
1000 INJECTION, SOLUTION INTRAMUSCULAR; INTRAVENOUS; SUBCUTANEOUS
Refills: 0 | Status: DISCONTINUED | OUTPATIENT
Start: 2024-11-06 | End: 2024-11-07

## 2024-11-06 RX ORDER — ONDANSETRON HYDROCHLORIDE 2 MG/ML
4 INJECTION, SOLUTION INTRAMUSCULAR; INTRAVENOUS EVERY 6 HOURS
Refills: 0 | Status: DISCONTINUED | OUTPATIENT
Start: 2024-11-06 | End: 2024-11-07

## 2024-11-06 RX ORDER — APIXABAN 5 MG/1
5 TABLET, FILM COATED ORAL
Refills: 0 | Status: DISCONTINUED | OUTPATIENT
Start: 2024-11-06 | End: 2024-11-07

## 2024-11-06 RX ORDER — METOPROLOL TARTRATE 50 MG
50 TABLET ORAL DAILY
Refills: 0 | Status: DISCONTINUED | OUTPATIENT
Start: 2024-11-06 | End: 2024-11-07

## 2024-11-06 RX ORDER — ACETAMINOPHEN 500 MG
650 TABLET ORAL EVERY 6 HOURS
Refills: 0 | Status: DISCONTINUED | OUTPATIENT
Start: 2024-11-06 | End: 2024-11-07

## 2024-11-06 RX ORDER — OXYCODONE HYDROCHLORIDE 30 MG/1
5 TABLET ORAL EVERY 6 HOURS
Refills: 0 | Status: DISCONTINUED | OUTPATIENT
Start: 2024-11-06 | End: 2024-11-07

## 2024-11-06 RX ORDER — ALPRAZOLAM 0.25 MG
0.25 TABLET ORAL EVERY 8 HOURS
Refills: 0 | Status: DISCONTINUED | OUTPATIENT
Start: 2024-11-06 | End: 2024-11-07

## 2024-11-06 RX ORDER — ASPIRIN/MAG CARB/ALUMINUM AMIN 325 MG
0 TABLET ORAL
Refills: 0 | DISCHARGE

## 2024-11-06 RX ORDER — DRONEDARONE 400 MG/1
400 TABLET, FILM COATED ORAL
Refills: 0 | Status: DISCONTINUED | OUTPATIENT
Start: 2024-11-06 | End: 2024-11-06

## 2024-11-06 RX ORDER — ACETAMINOPHEN 500 MG
1000 TABLET ORAL ONCE
Refills: 0 | Status: COMPLETED | OUTPATIENT
Start: 2024-11-06 | End: 2024-11-06

## 2024-11-06 RX ADMIN — Medication 650 MILLIGRAM(S): at 20:34

## 2024-11-06 RX ADMIN — Medication 650 MILLIGRAM(S): at 21:34

## 2024-11-06 RX ADMIN — APIXABAN 5 MILLIGRAM(S): 5 TABLET, FILM COATED ORAL at 20:35

## 2024-11-06 RX ADMIN — Medication 10 MILLIGRAM(S): at 20:35

## 2024-11-06 RX ADMIN — Medication 400 MILLIGRAM(S): at 18:28

## 2024-11-06 RX ADMIN — Medication 1000 MILLIGRAM(S): at 18:28

## 2024-11-06 RX ADMIN — SODIUM CHLORIDE 50 MILLILITER(S): 9 INJECTION, SOLUTION INTRAMUSCULAR; INTRAVENOUS; SUBCUTANEOUS at 18:28

## 2024-11-06 NOTE — H&P PST ADULT - HISTORY OF PRESENT ILLNESS
64-year-old male with dyslipidemia left kidney donor 2020, s/p mechanical fall resulting in pneumothorax required thoracic surgery 4/2024 and pAF.  In regards to pt pAF, it was first noted 7/2024.  Pt apple watch notified him that his HR was elevated and that he was in A.fib.  At time of episode pt was experiencing palpitations.  This led pt to establish care with a cardiologist.  Pt has has several additional symptomatic episodes of A.f where he felt palpitations, dizziness and near-syncope and then on 9/4/24 pt again had an episode of AF with RVR for which he went to [facility] and underwent LILLIAN/DCCV.  Since CV pt denies any noticing any additional episodes of AF.   Pt now presents to [facility] electively for AF ablation with Dr Cosby.  Pt reports no interval changes since his last office visit with Dr Cosby.  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  Last dose of Eliquis was yesterday morning at 8am.   NPO confirmed,        Cardiology Summary    EGC 9/4/24 AF  bpm narrow QRS    TTE 9/2024 shows normal biventricular size and function LVEF 57% LA/RA mildly dilated mild to moderate AS

## 2024-11-06 NOTE — H&P PST ADULT - ASSESSMENT
64-year-old male with dyslipidemia left kidney donor 2020, s/p mechanical fall resulting in pneumothorax required thoracic surgery 4/2024 and pAF.  In regards to pt pAF, it was first noted 7/2024.  Pt apple watch notified him that his HR was elevated and that he was in A.fib.  At time of episode pt was experiencing palpitations.  This led pt to establish care with a cardiologist.  Pt has has several additional symptomatic episodes of A.f where he felt palpitations, dizziness and near-syncope and then on 9/4/24 pt again had an episode of AF with RVR for which he went to [facility] and underwent LILLIAN/DCCV.  Since CV pt denies any noticing any additional episodes of AF.   Pt now presents to [facility] electively for AF ablation with Dr Cosby.  Pt reports no interval changes since his last office visit with Dr Cosby.  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  Last dose of Eliquis was yesterday morning at 8am.   NPO confirmed,      Plan  -Consent with Attending  -Stat labs and EKG

## 2024-11-06 NOTE — PROGRESS NOTE ADULT - SUBJECTIVE AND OBJECTIVE BOX
PROCEDURE(S): FARAPULSE Pulsed Field Ablation of Atrial Fibrillation    ELECTROPHYSIOLOGIST(S): Harjeet BEATTY         COMPLICATIONS:  none        DISPOSITION: observation      CONDITION: stable    Pt doing well s/p FARAPULSE pulsed field ablation of atrial fibrillation (PVI/PW) with access obtained via b/l femoral veins and hemostasis achieved with figure of 8 sutures. Denies complaint post procedure.     I/Os: net +    MEDICATIONS  (STANDING):  apixaban 5 milliGRAM(s) Oral <User Schedule>  dronedarone 400 milliGRAM(s) Oral two times a day  influenza  Vaccine (HIGH DOSE) 0.5 milliLiter(s) IntraMuscular once  metoprolol succinate ER 50 milliGRAM(s) Oral daily  rosuvastatin 10 milliGRAM(s) Oral at bedtime    MEDICATIONS  (PRN):  acetaminophen     Tablet .. 650 milliGRAM(s) Oral every 6 hours PRN Mild Pain (1 - 3)  ALPRAZolam 0.25 milliGRAM(s) Oral every 8 hours PRN anxiety  aluminum hydroxide/magnesium hydroxide/simethicone Suspension 30 milliLiter(s) Oral every 6 hours PRN Dyspepsia  benzocaine/menthol Lozenge 1 Lozenge Oral every 8 hours PRN Sore Throat  ondansetron Injectable 4 milliGRAM(s) IV Push every 6 hours PRN Nausea and/or Vomiting  oxyCODONE    IR 5 milliGRAM(s) Oral every 6 hours PRN Moderate Pain (4 - 6)      Allergies  No Known Allergies      T(C): 36.8 (11-06-24 @ 11:00), Max: 36.8 (11-06-24 @ 11:00)  HR: 71 (11-06-24 @ 16:05) (57 - 71)  BP: 117/85 (11-06-24 @ 16:05) (117/85 - 148/86)  RR: 16 (11-06-24 @ 16:05) (16 - 18)  SpO2: 100% (11-06-24 @ 16:05) (100% - 100%)      Physical exam:   awake, alert, no obvious distress   Card: S1/S2, RRR, no m/g/r  Resp: lungs CTA b/l  Abd: S/NT/ND  Groins: hemostatic sutures in place; sites C/D/I; no bleeding, hematoma, erythema, exudate or edema  Ext: no edema; distal pulses intact  Skin: warm, dry, no rash     Post-procedure VS  HR 71bpm  /85mmHg  SpO2 100% on RA    ECG: SR 76 bpm, MAIN 156ms, QRSD 86ms    Assessment:   64-year-old male with dyslipidemia left kidney donor 2020, s/p mechanical fall resulting in pneumothorax required thoracic surgery 4/2024 and pAF.  In regards to pt pAF, it was first noted 7/2024.  Pt apple watch notified him that his HR was elevated and that he was in A.fib.  At time of episode pt was experiencing palpitations.  This led pt to establish care with a cardiologist.  Pt has has several additional symptomatic episodes of A.f where he felt palpitations, dizziness and near-syncope and then on 9/4/24 pt again had an episode of AF with RVR for which he went to [facility] and underwent LILLIAN/DCCV.  Since CV pt denies any noticing any additional episodes of AF.   Pt presented electively and is now status post uncomplicated FARAPULSE pulsed field ablation of atrial fibrillation.    Plan:   Bedrest x 4 hours, then OOB with assistance and progress as tolerated.   Groin sutures to be removed by EP service in AM.   Pending groin status, resume Eliquis 5mg bid at 20:00 tonight  DO NOT HOLD, INTERRUPT OR REVERSE ANTICOAGULATION WITHOUT EXPLICIT APPROVAL FROM EP SERVICE.   Start Protonix 40mg once daily for 1 week.   Continue other home medications.   Strict I/Os.  Please encourage incentive spirometry and ambulation once able.  Observation and monitoring on telemetry overnight with anticipated discharge in the AM and outpt follow up in 2-4 weeks.   PROCEDURE(S): FARAPULSE Pulsed Field Ablation of Atrial Fibrillation    ELECTROPHYSIOLOGIST(S): Harjeet BEATTY         COMPLICATIONS:  none        DISPOSITION: observation      CONDITION: stable    Pt doing well s/p FARAPULSE pulsed field ablation of atrial fibrillation (PVI/PW) with access obtained via b/l femoral veins and hemostasis achieved with figure of 8 sutures. Denies complaint post procedure.     I/Os: net +    MEDICATIONS  (STANDING):  apixaban 5 milliGRAM(s) Oral <User Schedule>  dronedarone 400 milliGRAM(s) Oral two times a day  influenza  Vaccine (HIGH DOSE) 0.5 milliLiter(s) IntraMuscular once  metoprolol succinate ER 50 milliGRAM(s) Oral daily  rosuvastatin 10 milliGRAM(s) Oral at bedtime    MEDICATIONS  (PRN):  acetaminophen     Tablet .. 650 milliGRAM(s) Oral every 6 hours PRN Mild Pain (1 - 3)  ALPRAZolam 0.25 milliGRAM(s) Oral every 8 hours PRN anxiety  aluminum hydroxide/magnesium hydroxide/simethicone Suspension 30 milliLiter(s) Oral every 6 hours PRN Dyspepsia  benzocaine/menthol Lozenge 1 Lozenge Oral every 8 hours PRN Sore Throat  ondansetron Injectable 4 milliGRAM(s) IV Push every 6 hours PRN Nausea and/or Vomiting  oxyCODONE    IR 5 milliGRAM(s) Oral every 6 hours PRN Moderate Pain (4 - 6)      Allergies  No Known Allergies      T(C): 36.8 (11-06-24 @ 11:00), Max: 36.8 (11-06-24 @ 11:00)  HR: 71 (11-06-24 @ 16:05) (57 - 71)  BP: 117/85 (11-06-24 @ 16:05) (117/85 - 148/86)  RR: 16 (11-06-24 @ 16:05) (16 - 18)  SpO2: 100% (11-06-24 @ 16:05) (100% - 100%)      Physical exam:   awake, alert, no obvious distress   Card: S1/S2, RRR, no m/g/r  Resp: lungs CTA b/l  Abd: S/NT/ND  Groins: hemostatic sutures in place; sites C/D/I; no bleeding, hematoma, erythema, exudate or edema  Ext: no edema; distal pulses intact  Skin: warm, dry, no rash     Post-procedure VS  HR 71bpm  /85mmHg  SpO2 100% on RA    ECG: SR 76 bpm, MAIN 156ms, QRSD 86ms    Assessment:   64-year-old male with dyslipidemia left kidney donor 2020, s/p mechanical fall resulting in pneumothorax required thoracic surgery 4/2024 and pAF.  In regards to pt pAF, it was first noted 7/2024.  Pt apple watch notified him that his HR was elevated and that he was in A.fib.  At time of episode pt was experiencing palpitations.  This led pt to establish care with a cardiologist.  Pt has has several additional symptomatic episodes of A.f where he felt palpitations, dizziness and near-syncope and then on 9/4/24 pt again had an episode of AF with RVR for which he went to [facility] and underwent LILLIAN/DCCV.  Since CV pt denies any noticing any additional episodes of AF.   Pt presented electively and is now status post uncomplicated FARAPULSE pulsed field ablation of atrial fibrillation.    Plan:   Bedrest x 4 hours, then OOB with assistance and progress as tolerated.   Groin sutures to be removed by EP service in AM.   Pending groin status, resume Eliquis 5mg bid at 20:00 tonight  DO NOT HOLD, INTERRUPT OR REVERSE ANTICOAGULATION WITHOUT EXPLICIT APPROVAL FROM EP SERVICE.  Discontinue Multaq   Start Protonix 40mg once daily for 1 week.   Continue other home medications.   Strict I/Os.  Please encourage incentive spirometry and ambulation once able.  Observation and monitoring on telemetry overnight with anticipated discharge in the AM and outpt follow up in 2-4 weeks.   PROCEDURE(S): FARAPULSE Pulsed Field Ablation of Atrial Fibrillation    ELECTROPHYSIOLOGIST(S): Harjeet BEATTY         COMPLICATIONS:  none        DISPOSITION: observation      CONDITION: stable    Pt doing well s/p FARAPULSE pulsed field ablation of atrial fibrillation (PVI/PW) with access obtained via b/l femoral veins and hemostasis achieved with figure of 8 sutures. Denies complaint post procedure.     I/Os: net +    MEDICATIONS  (STANDING):  apixaban 5 milliGRAM(s) Oral <User Schedule>  dronedarone 400 milliGRAM(s) Oral two times a day  influenza  Vaccine (HIGH DOSE) 0.5 milliLiter(s) IntraMuscular once  metoprolol succinate ER 50 milliGRAM(s) Oral daily  rosuvastatin 10 milliGRAM(s) Oral at bedtime    MEDICATIONS  (PRN):  acetaminophen     Tablet .. 650 milliGRAM(s) Oral every 6 hours PRN Mild Pain (1 - 3)  ALPRAZolam 0.25 milliGRAM(s) Oral every 8 hours PRN anxiety  aluminum hydroxide/magnesium hydroxide/simethicone Suspension 30 milliLiter(s) Oral every 6 hours PRN Dyspepsia  benzocaine/menthol Lozenge 1 Lozenge Oral every 8 hours PRN Sore Throat  ondansetron Injectable 4 milliGRAM(s) IV Push every 6 hours PRN Nausea and/or Vomiting  oxyCODONE    IR 5 milliGRAM(s) Oral every 6 hours PRN Moderate Pain (4 - 6)      Allergies  No Known Allergies      T(C): 36.8 (11-06-24 @ 11:00), Max: 36.8 (11-06-24 @ 11:00)  HR: 71 (11-06-24 @ 16:05) (57 - 71)  BP: 117/85 (11-06-24 @ 16:05) (117/85 - 148/86)  RR: 16 (11-06-24 @ 16:05) (16 - 18)  SpO2: 100% (11-06-24 @ 16:05) (100% - 100%)      Physical exam:   awake, alert, no obvious distress   Card: S1/S2, RRR, no m/g/r  Resp: lungs CTA b/l  Abd: S/NT/ND  Groins: hemostatic sutures in place; sites C/D/I; no bleeding, hematoma, erythema, exudate or edema  Ext: no edema; distal pulses intact  Skin: warm, dry, no rash     Post-procedure VS  HR 71bpm  /85mmHg  SpO2 100% on RA    ECG: SR 76 bpm, MAIN 156ms, QRSD 86ms    Assessment:   64-year-old male with dyslipidemia left kidney donor 2020, s/p mechanical fall resulting in pneumothorax required thoracic surgery 4/2024 and pAF.  In regards to pt pAF, it was first noted 7/2024.  Pt apple watch notified him that his HR was elevated and that he was in A.fib.  At time of episode pt was experiencing palpitations.  This led pt to establish care with a cardiologist.  Pt has has several additional symptomatic episodes of A.f where he felt palpitations, dizziness and near-syncope and then on 9/4/24 pt again had an episode of AF with RVR for which he went to [facility] and underwent LILLIAN/DCCV.  Since CV pt denies any noticing any additional episodes of AF.   Pt presented electively and is now status post uncomplicated FARAPULSE pulsed field ablation of atrial fibrillation.    Plan:   Bedrest x 4 hours, then OOB with assistance and progress as tolerated.   Groin sutures to be removed by EP service in AM.   Pending groin status, resume Eliquis 5mg bid at 20:00 tonight  DO NOT HOLD, INTERRUPT OR REVERSE ANTICOAGULATION WITHOUT EXPLICIT APPROVAL FROM EP SERVICE.  Discontinue Multaq   Start Protonix 40mg once daily for 1 week.   Continue other home medications.   Strict I/Os.  Gentle hydration overnight. NaCl 50cc/hr [id number]  Please encourage incentive spirometry and ambulation once able.  Observation and monitoring on telemetry overnight with anticipated discharge in the AM and outpt follow up in 2-4 weeks.

## 2024-11-06 NOTE — PROGRESS NOTE ADULT - SUBJECTIVE AND OBJECTIVE BOX
Admission Criteria  Please admit the patient to the following service: CARDIOLOGY    Major Criteria:    - Continuous EKG monitoring is required for condition causing arrhythmia (hyperkalemia, etc)    - Significant volume load > 200 ml      Admit to: 4BKT     Patient is being admitted to the inpatient service due to high risk characteristics and need for further management/monitoring and is considered to be at a significantly increased risk of major adverse cardiac and vascular events if discharged.

## 2024-11-06 NOTE — PATIENT PROFILE ADULT - FALL HARM RISK - RISK INTERVENTIONS

## 2024-11-06 NOTE — H&P PST ADULT - NSICDXPASTMEDICALHX_GEN_ALL_CORE_FT
PAST MEDICAL HISTORY:  HLD (hyperlipidemia)     Kidney donor     Paroxysmal atrial fibrillation     Pneumothorax, right

## 2024-11-06 NOTE — DISCHARGE NOTE PROVIDER - NSDCMRMEDTOKEN_GEN_ALL_CORE_FT
ammonium lactate 12% topical lotion: Apply topically to affected area 2 times a day as needed for  rash  apixaban 5 mg oral tablet: 1 tab(s) orally every 12 hours  Centrum Silver oral tablet: 1 tab(s) orally once a day  ciclopirox 0.77% topical cream: Apply topically to affected area 2 times a day as needed for  rash  Multaq 400 mg oral tablet: 1 tab(s) orally 2 times a day  rosuvastatin 10 mg oral tablet: 1 tab(s) orally once a day  tadalafil 5 mg oral tablet: 1 tab(s) orally prn as needed for intercourse  Toprol-XL 50 mg oral tablet, extended release: 1 tab(s) orally once a day   ammonium lactate 12% topical lotion: Apply topically to affected area 2 times a day as needed for  rash  apixaban 5 mg oral tablet: 1 tab(s) orally every 12 hours  Centrum Silver oral tablet: 1 tab(s) orally once a day  ciclopirox 0.77% topical cream: Apply topically to affected area 2 times a day as needed for  rash  Multaq 400 mg oral tablet: 1 tab(s) orally 2 times a day  pantoprazole 40 mg oral delayed release tablet: 1 tab(s) orally once a day (before a meal) x 7 days  rosuvastatin 10 mg oral tablet: 1 tab(s) orally once a day  tadalafil 5 mg oral tablet: 1 tab(s) orally prn as needed for intercourse  Toprol-XL 50 mg oral tablet, extended release: 1 tab(s) orally once a day   ammonium lactate 12% topical lotion: Apply topically to affected area 2 times a day as needed for  rash  apixaban 5 mg oral tablet: 1 tab(s) orally every 12 hours  Centrum Silver oral tablet: 1 tab(s) orally once a day  ciclopirox 0.77% topical cream: Apply topically to affected area 2 times a day as needed for  rash  pantoprazole 40 mg oral delayed release tablet: 1 tab(s) orally once a day (before a meal) x 7 days  rosuvastatin 10 mg oral tablet: 1 tab(s) orally once a day  tadalafil 5 mg oral tablet: 1 tab(s) orally prn as needed for intercourse  Toprol-XL 50 mg oral tablet, extended release: 1 tab(s) orally once a day

## 2024-11-06 NOTE — DISCHARGE NOTE PROVIDER - NSDCCPTREATMENT_GEN_ALL_CORE_FT
PRINCIPAL PROCEDURE  Procedure: Intracardiac catheter ablation of arrhythmogenic focus  Findings and Treatment: - Bruising at the groin, sometimes extending down the leg, and/or a small lump under the skin at the groin access site is normal and will resolve within 2 – 3 weeks.   - Occasional skipped beats or palpitations that last for a few beats are common and generally resolve within 1-2 months.   - You may walk and take stairs at a regular pace.   - Do not perform any exercise more strenuous than walking for 1 week.   - Do not strain or lift heavy objects for 1 week.  - You may shower the day after the procedure.  - Do not soak in water (such as tub baths, hot tubs, swimming, etc.) for 1 week.   - You may resume all other activities the day after the procedure.  Call your doctor if:   - you notice bleeding, redness, drainage, swelling, increased tenderness or a hot sensation around the catheter insertion site.   - your temperature is greater than 100 degrees F for more than 24 hours.  - your rapid heart rhythm returns.  - you have any questions or concerns regarding the procedure.  If significant bleeding and/or a large lump (the size of a golf ball or bigger) occurs:  - Lie flat and apply continuous direct pressure just above the puncture site for at least 10 minutes  - If the issue resolves, notify your physician immediately.    - If the bleeding cannot be controlled, please seek immediate medical attention.  If you experience increased difficulty breathing or chest pain, or if you faint or have dizzy spells, please seek immediate medical attention.

## 2024-11-06 NOTE — DISCHARGE NOTE PROVIDER - NSDCFUADDINST_GEN_ALL_CORE_FT
Quality 130: Documentation Of Current Medications In The Medical Record: Current Medications Documented Quality 226: Preventive Care And Screening: Tobacco Use: Screening And Cessation Intervention: Patient screened for tobacco use and is an ex/non-smoker Detail Level: Detailed Follow up with Dr. Cosby in one months time. The office will call you with an appointment in 3-5 days.

## 2024-11-06 NOTE — DISCHARGE NOTE PROVIDER - HOSPITAL COURSE
64-year-old male with dyslipidemia left kidney donor 2020, s/p mechanical fall resulting in pneumothorax required thoracic surgery 4/2024 and pAF.  In regards to pt pAF, it was first noted 7/2024.  Pt apple watch notified him that his HR was elevated and that he was in A.fib.  At time of episode pt was experiencing palpitations.  This led pt to establish care with a cardiologist.  Pt has has several additional symptomatic episodes of A.f where he felt palpitations, dizziness and near-syncope and then on 9/4/24 pt again had an episode of AF with RVR for which he went to [facility] and underwent LILLIAN/DCCV.  Since CV pt denies any noticing any additional episodes of AF.   Pt presented electively and is now status post uncomplicated FARAPULSE pulsed field ablation of atrial fibrillation.

## 2024-11-06 NOTE — DISCHARGE NOTE PROVIDER - CARE PROVIDER_API CALL
Ramón Cosby  Cardiac Electrophysiology  [address] [place] [address]  Phone: [phone]  Fax: [phone]  Follow Up Time:

## 2024-11-07 ENCOUNTER — TRANSCRIPTION ENCOUNTER (OUTPATIENT)
Age: 64
End: 2024-11-07

## 2024-11-07 VITALS
HEART RATE: 69 BPM | DIASTOLIC BLOOD PRESSURE: 77 MMHG | OXYGEN SATURATION: 94 % | SYSTOLIC BLOOD PRESSURE: 120 MMHG | RESPIRATION RATE: 18 BRPM | TEMPERATURE: 98 F

## 2024-11-07 LAB
ANION GAP SERPL CALC-SCNC: 10 MMOL/L — SIGNIFICANT CHANGE UP (ref 5–17)
ANION GAP SERPL CALC-SCNC: 11 MMOL/L — SIGNIFICANT CHANGE UP (ref 5–17)
BUN SERPL-MCNC: 20.3 MG/DL — HIGH (ref 8–20)
BUN SERPL-MCNC: 23.2 MG/DL — HIGH (ref 8–20)
CALCIUM SERPL-MCNC: 8 MG/DL — LOW (ref 8.4–10.5)
CALCIUM SERPL-MCNC: 8.2 MG/DL — LOW (ref 8.4–10.5)
CHLORIDE SERPL-SCNC: 102 MMOL/L — SIGNIFICANT CHANGE UP (ref 96–108)
CHLORIDE SERPL-SCNC: 104 MMOL/L — SIGNIFICANT CHANGE UP (ref 96–108)
CO2 SERPL-SCNC: 23 MMOL/L — SIGNIFICANT CHANGE UP (ref 22–29)
CO2 SERPL-SCNC: 23 MMOL/L — SIGNIFICANT CHANGE UP (ref 22–29)
CREAT SERPL-MCNC: 1.24 MG/DL — SIGNIFICANT CHANGE UP (ref 0.5–1.3)
CREAT SERPL-MCNC: 1.41 MG/DL — HIGH (ref 0.5–1.3)
EGFR: 55 ML/MIN/1.73M2 — LOW
EGFR: 65 ML/MIN/1.73M2 — SIGNIFICANT CHANGE UP
GLUCOSE SERPL-MCNC: 112 MG/DL — HIGH (ref 70–99)
GLUCOSE SERPL-MCNC: 92 MG/DL — SIGNIFICANT CHANGE UP (ref 70–99)
HCT VFR BLD CALC: 34.3 % — LOW (ref 39–50)
HCT VFR BLD CALC: 34.4 % — LOW (ref 39–50)
HGB BLD-MCNC: 11.9 G/DL — LOW (ref 13–17)
HGB BLD-MCNC: 12.1 G/DL — LOW (ref 13–17)
MAGNESIUM SERPL-MCNC: 2.1 MG/DL — SIGNIFICANT CHANGE UP (ref 1.6–2.6)
MCHC RBC-ENTMCNC: 33.7 PG — SIGNIFICANT CHANGE UP (ref 27–34)
MCHC RBC-ENTMCNC: 34.3 PG — HIGH (ref 27–34)
MCHC RBC-ENTMCNC: 34.7 G/DL — SIGNIFICANT CHANGE UP (ref 32–36)
MCHC RBC-ENTMCNC: 35.2 G/DL — SIGNIFICANT CHANGE UP (ref 32–36)
MCV RBC AUTO: 97.2 FL — SIGNIFICANT CHANGE UP (ref 80–100)
MCV RBC AUTO: 97.5 FL — SIGNIFICANT CHANGE UP (ref 80–100)
PLATELET # BLD AUTO: 160 K/UL — SIGNIFICANT CHANGE UP (ref 150–400)
PLATELET # BLD AUTO: 165 K/UL — SIGNIFICANT CHANGE UP (ref 150–400)
POTASSIUM SERPL-MCNC: 3.9 MMOL/L — SIGNIFICANT CHANGE UP (ref 3.5–5.3)
POTASSIUM SERPL-MCNC: 4.5 MMOL/L — SIGNIFICANT CHANGE UP (ref 3.5–5.3)
POTASSIUM SERPL-SCNC: 3.9 MMOL/L — SIGNIFICANT CHANGE UP (ref 3.5–5.3)
POTASSIUM SERPL-SCNC: 4.5 MMOL/L — SIGNIFICANT CHANGE UP (ref 3.5–5.3)
RBC # BLD: 3.53 M/UL — LOW (ref 4.2–5.8)
RBC # BLD: 3.53 M/UL — LOW (ref 4.2–5.8)
RBC # FLD: 12.3 % — SIGNIFICANT CHANGE UP (ref 10.3–14.5)
RBC # FLD: 12.6 % — SIGNIFICANT CHANGE UP (ref 10.3–14.5)
SODIUM SERPL-SCNC: 136 MMOL/L — SIGNIFICANT CHANGE UP (ref 135–145)
SODIUM SERPL-SCNC: 137 MMOL/L — SIGNIFICANT CHANGE UP (ref 135–145)
WBC # BLD: 7.96 K/UL — SIGNIFICANT CHANGE UP (ref 3.8–10.5)
WBC # BLD: 8.55 K/UL — SIGNIFICANT CHANGE UP (ref 3.8–10.5)
WBC # FLD AUTO: 7.96 K/UL — SIGNIFICANT CHANGE UP (ref 3.8–10.5)
WBC # FLD AUTO: 8.55 K/UL — SIGNIFICANT CHANGE UP (ref 3.8–10.5)

## 2024-11-07 PROCEDURE — C9399: CPT

## 2024-11-07 PROCEDURE — C1730: CPT

## 2024-11-07 PROCEDURE — 36415 COLL VENOUS BLD VENIPUNCTURE: CPT

## 2024-11-07 PROCEDURE — 93010 ELECTROCARDIOGRAM REPORT: CPT

## 2024-11-07 PROCEDURE — C1893: CPT

## 2024-11-07 PROCEDURE — 93623 PRGRMD STIMJ&PACG IV RX NFS: CPT

## 2024-11-07 PROCEDURE — 93656 COMPRE EP EVAL ABLTJ ATR FIB: CPT

## 2024-11-07 PROCEDURE — 86901 BLOOD TYPING SEROLOGIC RH(D): CPT

## 2024-11-07 PROCEDURE — 93005 ELECTROCARDIOGRAM TRACING: CPT

## 2024-11-07 PROCEDURE — C1733: CPT

## 2024-11-07 PROCEDURE — C1759: CPT

## 2024-11-07 PROCEDURE — C1769: CPT

## 2024-11-07 PROCEDURE — 83735 ASSAY OF MAGNESIUM: CPT

## 2024-11-07 PROCEDURE — C1732: CPT

## 2024-11-07 PROCEDURE — 80048 BASIC METABOLIC PNL TOTAL CA: CPT

## 2024-11-07 PROCEDURE — C1894: CPT

## 2024-11-07 PROCEDURE — 93622 COMP EP EVAL L VENTR PAC&REC: CPT

## 2024-11-07 PROCEDURE — 85027 COMPLETE CBC AUTOMATED: CPT

## 2024-11-07 PROCEDURE — C1766: CPT

## 2024-11-07 PROCEDURE — 86900 BLOOD TYPING SEROLOGIC ABO: CPT

## 2024-11-07 PROCEDURE — 93657 TX L/R ATRIAL FIB ADDL: CPT

## 2024-11-07 PROCEDURE — 85610 PROTHROMBIN TIME: CPT

## 2024-11-07 PROCEDURE — 86850 RBC ANTIBODY SCREEN: CPT

## 2024-11-07 RX ORDER — DRONEDARONE 400 MG/1
1 TABLET, FILM COATED ORAL
Refills: 0 | DISCHARGE

## 2024-11-07 RX ORDER — PANTOPRAZOLE SODIUM 40 MG/1
1 TABLET, DELAYED RELEASE ORAL
Qty: 7 | Refills: 0
Start: 2024-11-07 | End: 2024-11-13

## 2024-11-07 RX ADMIN — Medication 650 MILLIGRAM(S): at 10:22

## 2024-11-07 RX ADMIN — Medication 650 MILLIGRAM(S): at 09:22

## 2024-11-07 RX ADMIN — Medication 50 MILLIGRAM(S): at 05:32

## 2024-11-07 RX ADMIN — APIXABAN 5 MILLIGRAM(S): 5 TABLET, FILM COATED ORAL at 09:22

## 2024-11-07 RX ADMIN — PANTOPRAZOLE SODIUM 40 MILLIGRAM(S): 40 TABLET, DELAYED RELEASE ORAL at 05:32

## 2024-11-07 NOTE — DISCHARGE NOTE NURSING/CASE MANAGEMENT/SOCIAL WORK - FINANCIAL ASSISTANCE
[facility] provides services at a reduced cost to those who are determined to be eligible through [facility]’s financial assistance program. Information regarding [facility]’s financial assistance program can be found by going to [email or web address] or by calling [phone].

## 2024-11-07 NOTE — SBIRT NOTE ADULT - NSSBIRTBRIEFINTDET_GEN_A_CORE
Pt reports he shares a 750mL bottle with his wife [2.5 drinks] 3x a week w/dinner. Pt reports he'll have 6+ drinks if he goes to an all inclusive. SW discussed recommended range for a man his age. Pt denies having any concerns and declined resources.

## 2024-11-07 NOTE — PROGRESS NOTE ADULT - SUBJECTIVE AND OBJECTIVE BOX
Patient seen today in bed. Figure 8 suture removed from right and left groin without complication No overnight complaints. Telemetry reviewed.     EKG: pending  TELE: SR. No events     MEDICATIONS  (STANDING):  apixaban 5 milliGRAM(s) Oral <User Schedule>  influenza  Vaccine (HIGH DOSE) 0.5 milliLiter(s) IntraMuscular once  metoprolol succinate ER 50 milliGRAM(s) Oral daily  pantoprazole    Tablet 40 milliGRAM(s) Oral before breakfast  rosuvastatin 10 milliGRAM(s) Oral at bedtime  sodium chloride 0.9%. 1000 milliLiter(s) (50 mL/Hr) IV Continuous <Continuous>    MEDICATIONS  (PRN):  acetaminophen     Tablet .. 650 milliGRAM(s) Oral every 6 hours PRN Mild Pain (1 - 3)  ALPRAZolam 0.25 milliGRAM(s) Oral every 8 hours PRN anxiety  aluminum hydroxide/magnesium hydroxide/simethicone Suspension 30 milliLiter(s) Oral every 6 hours PRN Dyspepsia  benzocaine/menthol Lozenge 1 Lozenge Oral every 8 hours PRN Sore Throat  ondansetron Injectable 4 milliGRAM(s) IV Push every 6 hours PRN Nausea and/or Vomiting  oxyCODONE    IR 5 milliGRAM(s) Oral every 6 hours PRN Moderate Pain (4 - 6)    Allergies  No Known Allergies    PAST MEDICAL & SURGICAL HISTORY:  HLD (hyperlipidemia)  Paroxysmal atrial fibrillation  Pneumothorax, right  Kidney donor  No significant past surgical history    Vital Signs Last 24 Hrs  T(C): 36.8 (07 Nov 2024 09:02), Max: 36.9 (06 Nov 2024 18:54)  T(F): 98.2 (07 Nov 2024 09:02), Max: 98.5 (07 Nov 2024 00:01)  HR: 69 (07 Nov 2024 09:02) (57 - 86)  BP: 120/77 (07 Nov 2024 09:02) (108/95 - 148/90)  BP(mean): 91 (07 Nov 2024 09:02) (87 - 105)  RR: 18 (07 Nov 2024 09:02) (13 - 18)  SpO2: 94% (07 Nov 2024 09:02) (94% - 100%)    Parameters below as of 07 Nov 2024 09:02  Patient On (Oxygen Delivery Method): room air    Physical Exam:  Constitutional: NAD, AAOx3  Cardiovascular: +S1S2 RRR  Pulmonary: CTA b/l, unlabored  GI: soft NTND +BS  Extremities: no pedal edema,   Right and left groin: No hematoma or ecchymosis noted.   Neuro: non focal, SHEARER x4    LABS:                        11.9   8.55  )-----------( 165      ( 07 Nov 2024 05:12 )             34.3     137  |  104  |  23.2[H]  ----------------------------<  112[H]  4.5   |  23.0  |  1.41[H]  Ca    8.2[L]      07 Nov 2024 05:12  Mg     2.1     11-0  PT/INR- ( 06 Nov 2024 11:05 )   PT: 12.1 sec;   INR: 1.07 ratio      A/P  64-year-old male with dyslipidemia left kidney donor 2020, s/p mechanical fall resulting in pneumothorax required thoracic surgery 4/2024 and pAF who is now POD#1 status post uncomplicated FARAPULSE pulsed field ablation of atrial fibrillation.    - Repeat labs this afternoon - AM labs with JOSEY and anemia.   - Stop Multaq  - Protonix 40mg PO QD x 7 days  - Discharge planning hopefully this afternoon.

## 2024-11-07 NOTE — DISCHARGE NOTE NURSING/CASE MANAGEMENT/SOCIAL WORK - PATIENT PORTAL LINK FT
You can access the FollowMyHealth Patient Portal offered by [facility] by registering at the following website: [email or web address]. By joining [employer]’s FollowMyHealth portal, you will also be able to view your health information using other applications (apps) compatible with our system.

## 2024-11-13 PROBLEM — I48.0 PAROXYSMAL ATRIAL FIBRILLATION: Chronic | Status: ACTIVE | Noted: 2024-11-06

## 2024-11-13 PROBLEM — J93.9 PNEUMOTHORAX, UNSPECIFIED: Chronic | Status: ACTIVE | Noted: 2024-11-06

## 2024-11-13 PROBLEM — Z52.4 KIDNEY DONOR: Chronic | Status: ACTIVE | Noted: 2024-11-06

## 2024-11-21 ENCOUNTER — NON-APPOINTMENT (OUTPATIENT)
Age: 64
End: 2024-11-21

## 2024-11-21 ENCOUNTER — APPOINTMENT (OUTPATIENT)
Dept: GASTROENTEROLOGY | Facility: CLINIC | Age: 64
End: 2024-11-21
Payer: COMMERCIAL

## 2024-11-21 VITALS
DIASTOLIC BLOOD PRESSURE: 80 MMHG | WEIGHT: 221 LBS | BODY MASS INDEX: 29.29 KG/M2 | SYSTOLIC BLOOD PRESSURE: 118 MMHG | HEIGHT: 73 IN

## 2024-11-21 DIAGNOSIS — Z12.11 ENCOUNTER FOR SCREENING FOR MALIGNANT NEOPLASM OF COLON: ICD-10-CM

## 2024-11-21 PROCEDURE — 99203 OFFICE O/P NEW LOW 30 MIN: CPT

## 2024-12-09 ENCOUNTER — APPOINTMENT (OUTPATIENT)
Dept: ELECTROPHYSIOLOGY | Facility: CLINIC | Age: 64
End: 2024-12-09
Payer: COMMERCIAL

## 2024-12-09 VITALS
WEIGHT: 220 LBS | HEIGHT: 76 IN | DIASTOLIC BLOOD PRESSURE: 82 MMHG | SYSTOLIC BLOOD PRESSURE: 118 MMHG | BODY MASS INDEX: 26.79 KG/M2 | OXYGEN SATURATION: 98 % | HEART RATE: 62 BPM

## 2024-12-09 PROCEDURE — 93000 ELECTROCARDIOGRAM COMPLETE: CPT

## 2024-12-09 PROCEDURE — 99215 OFFICE O/P EST HI 40 MIN: CPT | Mod: 25

## 2024-12-10 ENCOUNTER — APPOINTMENT (OUTPATIENT)
Dept: OTOLARYNGOLOGY | Facility: CLINIC | Age: 64
End: 2024-12-10
Payer: COMMERCIAL

## 2024-12-10 VITALS — BODY MASS INDEX: 26.67 KG/M2 | WEIGHT: 219 LBS | HEIGHT: 76 IN

## 2024-12-10 PROCEDURE — 99213 OFFICE O/P EST LOW 20 MIN: CPT | Mod: 25

## 2024-12-10 PROCEDURE — 30903 CONTROL OF NOSEBLEED: CPT | Mod: LT

## 2024-12-11 ENCOUNTER — NON-APPOINTMENT (OUTPATIENT)
Age: 64
End: 2024-12-11

## 2024-12-11 ENCOUNTER — APPOINTMENT (OUTPATIENT)
Dept: CARDIOLOGY | Facility: CLINIC | Age: 64
End: 2024-12-11
Payer: COMMERCIAL

## 2024-12-11 VITALS
HEART RATE: 77 BPM | OXYGEN SATURATION: 97 % | SYSTOLIC BLOOD PRESSURE: 114 MMHG | BODY MASS INDEX: 25.92 KG/M2 | WEIGHT: 224 LBS | DIASTOLIC BLOOD PRESSURE: 66 MMHG | HEIGHT: 78 IN

## 2024-12-11 DIAGNOSIS — Z01.818 ENCOUNTER FOR OTHER PREPROCEDURAL EXAMINATION: ICD-10-CM

## 2024-12-11 DIAGNOSIS — I48.91 UNSPECIFIED ATRIAL FIBRILLATION: ICD-10-CM

## 2024-12-11 DIAGNOSIS — E78.5 HYPERLIPIDEMIA, UNSPECIFIED: ICD-10-CM

## 2024-12-11 PROCEDURE — 99214 OFFICE O/P EST MOD 30 MIN: CPT

## 2024-12-11 PROCEDURE — G2211 COMPLEX E/M VISIT ADD ON: CPT | Mod: NC

## 2024-12-12 ENCOUNTER — APPOINTMENT (OUTPATIENT)
Dept: OTOLARYNGOLOGY | Facility: CLINIC | Age: 64
End: 2024-12-12
Payer: COMMERCIAL

## 2024-12-12 VITALS — WEIGHT: 224 LBS | HEIGHT: 78 IN | BODY MASS INDEX: 25.92 KG/M2

## 2024-12-12 DIAGNOSIS — D68.8 OTHER SPECIFIED COAGULATION DEFECTS: ICD-10-CM

## 2024-12-12 DIAGNOSIS — R04.0 EPISTAXIS: ICD-10-CM

## 2024-12-12 PROCEDURE — 99213 OFFICE O/P EST LOW 20 MIN: CPT | Mod: 25

## 2024-12-12 PROCEDURE — 30903 CONTROL OF NOSEBLEED: CPT | Mod: LT

## 2024-12-17 ENCOUNTER — APPOINTMENT (OUTPATIENT)
Dept: GASTROENTEROLOGY | Facility: AMBULATORY MEDICAL SERVICES | Age: 64
End: 2024-12-17
Payer: COMMERCIAL

## 2024-12-17 ENCOUNTER — RESULT REVIEW (OUTPATIENT)
Age: 64
End: 2024-12-17

## 2024-12-17 PROCEDURE — 45380 COLONOSCOPY AND BIOPSY: CPT

## 2025-02-12 ENCOUNTER — RX RENEWAL (OUTPATIENT)
Age: 65
End: 2025-02-12

## 2025-02-13 ENCOUNTER — RX RENEWAL (OUTPATIENT)
Age: 65
End: 2025-02-13

## 2025-02-19 ENCOUNTER — APPOINTMENT (OUTPATIENT)
Dept: DERMATOLOGY | Facility: CLINIC | Age: 65
End: 2025-02-19
Payer: COMMERCIAL

## 2025-02-19 DIAGNOSIS — D22.9 MELANOCYTIC NEVI, UNSPECIFIED: ICD-10-CM

## 2025-02-19 DIAGNOSIS — D18.01 HEMANGIOMA OF SKIN AND SUBCUTANEOUS TISSUE: ICD-10-CM

## 2025-02-19 DIAGNOSIS — L30.0 NUMMULAR DERMATITIS: ICD-10-CM

## 2025-02-19 PROCEDURE — 99214 OFFICE O/P EST MOD 30 MIN: CPT

## 2025-02-19 RX ORDER — METOPROLOL SUCCINATE 25 MG/1
25 TABLET, EXTENDED RELEASE ORAL
Qty: 45 | Refills: 1 | Status: DISCONTINUED | COMMUNITY
Start: 2025-02-13 | End: 2025-02-19

## 2025-02-19 RX ORDER — FLUTICASONE PROPIONATE 0.5 MG/G
0.05 CREAM TOPICAL TWICE DAILY
Qty: 1 | Refills: 1 | Status: ACTIVE | COMMUNITY
Start: 2025-02-19 | End: 1900-01-01

## 2025-02-26 ENCOUNTER — RX RENEWAL (OUTPATIENT)
Age: 65
End: 2025-02-26

## 2025-03-12 ENCOUNTER — APPOINTMENT (OUTPATIENT)
Dept: CARDIOLOGY | Facility: CLINIC | Age: 65
End: 2025-03-12
Payer: COMMERCIAL

## 2025-03-12 ENCOUNTER — NON-APPOINTMENT (OUTPATIENT)
Age: 65
End: 2025-03-12

## 2025-03-12 VITALS
HEIGHT: 78 IN | OXYGEN SATURATION: 98 % | SYSTOLIC BLOOD PRESSURE: 110 MMHG | DIASTOLIC BLOOD PRESSURE: 80 MMHG | WEIGHT: 226 LBS | BODY MASS INDEX: 26.15 KG/M2 | HEART RATE: 58 BPM

## 2025-03-12 DIAGNOSIS — R01.1 CARDIAC MURMUR, UNSPECIFIED: ICD-10-CM

## 2025-03-12 DIAGNOSIS — E78.5 HYPERLIPIDEMIA, UNSPECIFIED: ICD-10-CM

## 2025-03-12 DIAGNOSIS — I48.91 UNSPECIFIED ATRIAL FIBRILLATION: ICD-10-CM

## 2025-03-12 DIAGNOSIS — I35.0 NONRHEUMATIC AORTIC (VALVE) STENOSIS: ICD-10-CM

## 2025-03-12 PROCEDURE — 93000 ELECTROCARDIOGRAM COMPLETE: CPT

## 2025-03-12 PROCEDURE — 99214 OFFICE O/P EST MOD 30 MIN: CPT

## 2025-03-12 PROCEDURE — G2211 COMPLEX E/M VISIT ADD ON: CPT | Mod: NC

## 2025-05-15 ENCOUNTER — NON-APPOINTMENT (OUTPATIENT)
Age: 65
End: 2025-05-15

## 2025-05-15 LAB
25(OH)D3 SERPL-MCNC: 52.3 NG/ML
ALBUMIN SERPL ELPH-MCNC: 4.6 G/DL
ALP BLD-CCNC: 73 U/L
ALT SERPL-CCNC: 22 U/L
ANION GAP SERPL CALC-SCNC: 13 MMOL/L
APPEARANCE: CLEAR
AST SERPL-CCNC: 25 U/L
BACTERIA: NEGATIVE /HPF
BASOPHILS # BLD AUTO: 0.04 K/UL
BASOPHILS NFR BLD AUTO: 0.9 %
BILIRUB SERPL-MCNC: 0.6 MG/DL
BILIRUBIN URINE: NEGATIVE
BLOOD URINE: NEGATIVE
BUN SERPL-MCNC: 19 MG/DL
CALCIUM SERPL-MCNC: 9.4 MG/DL
CAST: 0 /LPF
CHLORIDE SERPL-SCNC: 105 MMOL/L
CHOLEST SERPL-MCNC: 161 MG/DL
CO2 SERPL-SCNC: 23 MMOL/L
COLOR: YELLOW
CREAT SERPL-MCNC: 1.32 MG/DL
EGFRCR SERPLBLD CKD-EPI 2021: 59 ML/MIN/1.73M2
EOSINOPHIL # BLD AUTO: 0.34 K/UL
EOSINOPHIL NFR BLD AUTO: 7.9 %
EPITHELIAL CELLS: 0 /HPF
GLUCOSE QUALITATIVE U: NEGATIVE MG/DL
GLUCOSE SERPL-MCNC: 98 MG/DL
HCT VFR BLD CALC: 41.7 %
HDLC SERPL-MCNC: 54 MG/DL
HGB BLD-MCNC: 14 G/DL
IMM GRANULOCYTES NFR BLD AUTO: 0.2 %
IRON SERPL-MCNC: 105 UG/DL
KETONES URINE: NEGATIVE MG/DL
LDLC SERPL-MCNC: 92 MG/DL
LEUKOCYTE ESTERASE URINE: NEGATIVE
LYMPHOCYTES # BLD AUTO: 1.12 K/UL
LYMPHOCYTES NFR BLD AUTO: 26.1 %
MAN DIFF?: NORMAL
MCHC RBC-ENTMCNC: 33.4 PG
MCHC RBC-ENTMCNC: 33.6 G/DL
MCV RBC AUTO: 99.5 FL
MICROSCOPIC-UA: NORMAL
MONOCYTES # BLD AUTO: 0.33 K/UL
MONOCYTES NFR BLD AUTO: 7.7 %
NEUTROPHILS # BLD AUTO: 2.45 K/UL
NEUTROPHILS NFR BLD AUTO: 57.2 %
NITRITE URINE: NEGATIVE
NONHDLC SERPL-MCNC: 107 MG/DL
PH URINE: 6
PLATELET # BLD AUTO: 169 K/UL
POTASSIUM SERPL-SCNC: 4.6 MMOL/L
PROT SERPL-MCNC: 6.8 G/DL
PROTEIN URINE: NORMAL MG/DL
PSA SERPL-MCNC: 0.71 NG/ML
RBC # BLD: 4.19 M/UL
RBC # FLD: 12.4 %
RED BLOOD CELLS URINE: 1 /HPF
SODIUM SERPL-SCNC: 141 MMOL/L
SPECIFIC GRAVITY URINE: 1.02
TRIGL SERPL-MCNC: 77 MG/DL
TSH SERPL-ACNC: 1.84 UIU/ML
UROBILINOGEN URINE: 0.2 MG/DL
WBC # FLD AUTO: 4.29 K/UL
WHITE BLOOD CELLS URINE: 0 /HPF

## 2025-05-16 ENCOUNTER — APPOINTMENT (OUTPATIENT)
Dept: INTERNAL MEDICINE | Facility: CLINIC | Age: 65
End: 2025-05-16
Payer: COMMERCIAL

## 2025-05-16 VITALS
BODY MASS INDEX: 27.03 KG/M2 | SYSTOLIC BLOOD PRESSURE: 114 MMHG | TEMPERATURE: 98.2 F | OXYGEN SATURATION: 96 % | DIASTOLIC BLOOD PRESSURE: 80 MMHG | WEIGHT: 222 LBS | RESPIRATION RATE: 16 BRPM | HEART RATE: 54 BPM | HEIGHT: 76 IN

## 2025-05-16 DIAGNOSIS — R79.89 OTHER SPECIFIED ABNORMAL FINDINGS OF BLOOD CHEMISTRY: ICD-10-CM

## 2025-05-16 DIAGNOSIS — S22.5XXA: ICD-10-CM

## 2025-05-16 DIAGNOSIS — I48.91 UNSPECIFIED ATRIAL FIBRILLATION: ICD-10-CM

## 2025-05-16 DIAGNOSIS — Z00.00 ENCOUNTER FOR GENERAL ADULT MEDICAL EXAMINATION W/OUT ABNORMAL FINDINGS: ICD-10-CM

## 2025-05-16 DIAGNOSIS — Z01.818 ENCOUNTER FOR OTHER PREPROCEDURAL EXAMINATION: ICD-10-CM

## 2025-05-16 DIAGNOSIS — Z98.890 OTHER SPECIFIED POSTPROCEDURAL STATES: ICD-10-CM

## 2025-05-16 DIAGNOSIS — R51.9 HEADACHE, UNSPECIFIED: ICD-10-CM

## 2025-05-16 DIAGNOSIS — E78.5 HYPERLIPIDEMIA, UNSPECIFIED: ICD-10-CM

## 2025-05-16 DIAGNOSIS — Z87.898 PERSONAL HISTORY OF OTHER SPECIFIED CONDITIONS: ICD-10-CM

## 2025-05-16 DIAGNOSIS — R04.0 EPISTAXIS: ICD-10-CM

## 2025-05-16 DIAGNOSIS — Z12.11 ENCOUNTER FOR SCREENING FOR MALIGNANT NEOPLASM OF COLON: ICD-10-CM

## 2025-05-16 LAB
ESTIMATED AVERAGE GLUCOSE: 108 MG/DL
HBA1C MFR BLD HPLC: 5.4 %

## 2025-05-16 PROCEDURE — 99397 PER PM REEVAL EST PAT 65+ YR: CPT

## 2025-05-31 ENCOUNTER — APPOINTMENT (OUTPATIENT)
Dept: CT IMAGING | Facility: CLINIC | Age: 65
End: 2025-05-31
Payer: COMMERCIAL

## 2025-05-31 ENCOUNTER — OUTPATIENT (OUTPATIENT)
Dept: OUTPATIENT SERVICES | Facility: HOSPITAL | Age: 65
LOS: 1 days | End: 2025-05-31
Payer: COMMERCIAL

## 2025-05-31 DIAGNOSIS — Z00.8 ENCOUNTER FOR OTHER GENERAL EXAMINATION: ICD-10-CM

## 2025-05-31 DIAGNOSIS — R51.9 HEADACHE, UNSPECIFIED: ICD-10-CM

## 2025-05-31 PROCEDURE — 70450 CT HEAD/BRAIN W/O DYE: CPT | Mod: 26

## 2025-05-31 PROCEDURE — 70450 CT HEAD/BRAIN W/O DYE: CPT

## 2025-07-14 ENCOUNTER — OUTPATIENT (OUTPATIENT)
Dept: OUTPATIENT SERVICES | Facility: HOSPITAL | Age: 65
LOS: 1 days | End: 2025-07-14
Payer: COMMERCIAL

## 2025-07-14 ENCOUNTER — APPOINTMENT (OUTPATIENT)
Dept: ULTRASOUND IMAGING | Facility: CLINIC | Age: 65
End: 2025-07-14
Payer: COMMERCIAL

## 2025-07-14 DIAGNOSIS — Z00.8 ENCOUNTER FOR OTHER GENERAL EXAMINATION: ICD-10-CM

## 2025-07-14 PROCEDURE — 76775 US EXAM ABDO BACK WALL LIM: CPT

## 2025-07-14 PROCEDURE — 76775 US EXAM ABDO BACK WALL LIM: CPT | Mod: 26

## 2025-07-15 ENCOUNTER — NON-APPOINTMENT (OUTPATIENT)
Age: 65
End: 2025-07-15

## 2025-07-22 ENCOUNTER — RX RENEWAL (OUTPATIENT)
Age: 65
End: 2025-07-22